# Patient Record
Sex: FEMALE | Race: WHITE | Employment: FULL TIME | URBAN - METROPOLITAN AREA
[De-identification: names, ages, dates, MRNs, and addresses within clinical notes are randomized per-mention and may not be internally consistent; named-entity substitution may affect disease eponyms.]

---

## 2017-05-05 ENCOUNTER — OFFICE VISIT (OUTPATIENT)
Dept: INTERNAL MEDICINE CLINIC | Age: 22
End: 2017-05-05

## 2017-05-05 VITALS
TEMPERATURE: 99.2 F | BODY MASS INDEX: 24.98 KG/M2 | HEART RATE: 78 BPM | RESPIRATION RATE: 16 BRPM | WEIGHT: 155.4 LBS | HEIGHT: 66 IN | OXYGEN SATURATION: 98 % | DIASTOLIC BLOOD PRESSURE: 80 MMHG | SYSTOLIC BLOOD PRESSURE: 116 MMHG

## 2017-05-05 DIAGNOSIS — N63.21 BREAST LUMP ON LEFT SIDE AT 2 O'CLOCK POSITION: Primary | ICD-10-CM

## 2017-05-05 DIAGNOSIS — F41.9 ANXIETY: ICD-10-CM

## 2017-05-05 NOTE — MR AVS SNAPSHOT
Visit Information Date & Time Provider Department Dept. Phone Encounter #  
 5/5/2017  2:45 PM Brandy Brooke MD [place] [id number] [facility] [phone] Upcoming Health Maintenance Date Due  
 HPV AGE 9Y-34Y (1 of 3 - Female 3 Dose Series) 5/30/2005 DTaP/Tdap/Td series (1 - Tdap) 5/30/2015 PAP AKA CERVICAL CYTOLOGY 5/30/2015 INFLUENZA AGE 9 TO ADULT 8/1/2017 Allergies as of 5/5/2017  Review Complete On: 5/5/2017 By: Cornell Baum No Known Allergies Current Immunizations  Never Reviewed No immunizations on file. Not reviewed this visit Vitals BP Pulse Temp Resp Height(growth percentile) Weight(growth percentile) 116/80 (BP 1 Location: Left arm, BP Patient Position: Sitting) 78 99.2 °F (37.3 °C) (Oral) 16 5' 6\" (1.676 m) 155 lb 6.4 oz (70.5 kg) LMP SpO2 BMI OB Status Smoking Status 04/24/2017 (Exact Date) 98% 25.08 kg/m2 IUD Never Smoker Vitals History BMI and BSA Data Body Mass Index Body Surface Area 25.08 kg/m 2 1.81 m 2 Your Updated Medication List  
  
Notice  As of 5/5/2017  3:48 PM  
 You have not been prescribed any medications. Patient Instructions Exercise 1: 
The 4-7-8 (or Relaxing Breath) Exercise This exercise is utterly simple, takes almost no time, requires no equipment and can be done anywhere. Although you can do the exercise in any position, sit with your back straight while learning the exercise. Place the tip of your tongue against the ridge of tissue just behind your upper front teeth, and keep it there through the entire exercise. You will be exhaling through your mouth around your tongue; try pursing your lips slightly if this seems awkward. ? Exhale completely through your mouth, making a whoosh sound. ? Close your mouth and inhale quietly through your nose to a mental count of four. ? Hold your breath for a count of seven. ? Exhale completely through your mouth, making a whoosh sound to a count of eight. ? This is one breath. Now inhale again and repeat the cycle three more times for a total of four breaths. Note that you always inhale quietly through your nose and exhale audibly through your mouth. The tip of your tongue stays in position the whole time. Exhalation takes twice as long as inhalation. The absolute time you spend on each phase is not important; the ratio of 4:7:8 is important. If you have trouble holding your breath, speed the exercise up but keep to the ratio of 4:7:8 for the three phases. With practice you can slow it all down and get used to inhaling and exhaling more and more deeply. This exercise is a natural tranquilizer for the nervous system. Unlike tranquilizing drugs, which are often effective when you first take them but then lose their power over time, this exercise is subtle when you first try it but gains in power with repetition and practice. Do it at least twice a day. You cannot do it too frequently. Do not do more than four breaths at one time for the first month of practice. Later, if you wish, you can extend it to eight breaths. If you feel a little lightheaded when you first breathe this way, do not be concerned; it will pass. Once you develop this technique by practicing it every day, it will be a very useful tool that you will always have with you. Use it whenever anything upsetting happens - before you react. Use it whenever you are aware of internal tension. Use it to help you fall asleep. This exercise cannot be recommended too highly. Everyone can benefit from it. Exercise 2: 
Breath Counting If you want to get a feel for this challenging work, try your hand at breath counting, a deceptively simple technique much used in [address] practice. Sit in a comfortable position with the spine straight and head inclined slightly forward. Gently close your eyes and take a few deep breaths.  Then let the breath come naturally without trying to influence it. Ideally it will be quiet and slow, but depth and rhythm may vary. ? To begin the exercise, count \"one\" to yourself as you exhale. ? The next time you exhale, count \"two,\" and so on up to [place]. \" 
? Then begin a new cycle, counting \"one\" on the next exhalation. Never count higher than \"five,\" and count only when you exhale. You will know your attention has wandered when you find yourself up to \"eight,\" \"12,\" even \"19. \" Try to do 10 minutes of this form of meditation. Taken from Lashae Martinez MD [facility] of [address] Search Ezio Martinez breathing exercises on google and you will see a video Glinda Merlin Rescue remedy [facility]  (Gisela kava) Introducing [facility]! Mir Watters introduces [employer] patient portal. Now you can access parts of your medical record, email your doctor's office, and request medication refills online. 1. In your internet browser, go to [email or web address] 2. Click on the First Time User? Click Here link in the Sign In box. You will see the New Member Sign Up page. 3. Enter your [employer] Access Code exactly as it appears below. You will not need to use this code after youve completed the sign-up process. If you do not sign up before the expiration date, you must request a new code. · [employer] Access Code: [email or web address] Expires: 8/3/2017  3:48 PM 
 
4. Enter the last four digits of your Social Security Number (xxxx) and Date of Birth (mm/dd/yyyy) as indicated and click Submit. You will be taken to the next sign-up page. 5. Create a My Study Rewardst ID. This will be your [employer] login ID and cannot be changed, so think of one that is secure and easy to remember. 6. Create a [employer] password. You can change your password at any time. 7. Enter your Password Reset Question and Answer. This can be used at a later time if you forget your password. 8. Enter your e-mail address. You will receive e-mail notification when new information is available in [address] 9. Click Sign Up. You can now view and download portions of your medical record. 10. Click the Download Summary menu link to download a portable copy of your medical information. If you have questions, please visit the Frequently Asked Questions section of the [employer] website. Remember, [employer] is NOT to be used for urgent needs. For medical emergencies, dial 911. Now available from your iPhone and Android! Please provide this summary of care documentation to your next provider. If you have any questions after today's visit, please call [phone].

## 2017-05-05 NOTE — PROGRESS NOTES
Establish Care       HPI:  Katherine Avitia is a 24y.o. year old female who is here to establish care. She  had her edical care:  [facility] pediatrician. Went to [facility]      She reports the following history and medical concerns:      Dr. Harlan Schafer  Works at [employer] one -     Experiencing a lot of stress and anxiety- zoloft in the past.  A couple of months anxiety - fiance deployed to [facility]. Her anxiety increased with this mass found by GYN at last visit. Aunt- breast cancer. At 42 yo. Left breast pain-  Found by GYN. Has IUD. Ultrasound was normal tissue as per patient and told by nurse if she wants biopsy- she can get it (which made her worried)    Workouts 4 times a week. Do yoga classes. Neck shoulder tightness bilaterally    Assessment and Plan        1. Breast lump on left side at 2 o'clock position  Palpable mass without having any other dense tissue in other breast.  No LN found. Ultrasound negative. Will observe lesion and get records. 2. Anxiety  Breathing exercises given to patient. Rescue remedy. Continue with exercise. Start volleyball. 3. Neck Tightness- suggest stand up desk to help with neck strain issues. Visit Vitals    /80 (BP 1 Location: Left arm, BP Patient Position: Sitting)    Pulse 78    Temp 99.2 °F (37.3 °C) (Oral)    Resp 16    Ht 5' 6\" (1.676 m)    Wt 155 lb 6.4 oz (70.5 kg)    LMP 04/24/2017 (Exact Date)    SpO2 98%    BMI 25.08 kg/m2       Historical Data    Past Medical History:   Diagnosis Date    Stress 2017       History reviewed. No pertinent surgical history. No outpatient encounter prescriptions on file as of 5/5/2017. No facility-administered encounter medications on file as of 5/5/2017. No Known Allergies     Social History     Social History    Marital status: SINGLE     Spouse name: N/A    Number of children: N/A    Years of education: N/A     Occupational History    Not on file.      Social History Main Topics  Smoking status: Never Smoker    Smokeless tobacco: Never Used    Alcohol use Yes      Comment: occasional     Drug use: No    Sexual activity: Not on file     Other Topics Concern    Not on file     Social History Narrative    No narrative on file        Review of Systems   Constitutional: Negative for weight loss. Eyes: Negative for blurred vision. Respiratory: Negative for shortness of breath. Cardiovascular: Negative for chest pain. Gastrointestinal: Negative for abdominal pain. Genitourinary: Negative for dysuria and frequency. Skin: Negative for rash. Neurological: Negative for dizziness, weakness and headaches. Psychiatric/Behavioral: Negative for depression. The patient is nervous/anxious. Physical Exam   Constitutional: She appears well-developed and well-nourished. She is active. Non-toxic appearance. She does not have a sickly appearance. She does not appear ill. No distress. Eyes: Conjunctivae are normal.   Cardiovascular: Normal rate, regular rhythm, S1 normal, S2 normal, normal heart sounds and normal pulses. Exam reveals no gallop and no friction rub. Pulmonary/Chest: Effort normal and breath sounds normal. No respiratory distress. Abdominal: Soft. Bowel sounds are normal.   Musculoskeletal: She exhibits no edema or deformity. Back:    Neurological: She is alert. Skin: Skin is warm and dry. No rash noted. No pallor. Psychiatric: Her behavior is normal. Her mood appears anxious. Her affect is not blunt and not inappropriate. She does not exhibit a depressed mood. Vitals reviewed. Ortho Exam       No orders of the defined types were placed in this encounter. I have reviewed the patient's medical history in detail and updated the computerized patient record. We had a prolonged discussion about these complex clinical issues and went over the various important aspects to consider. All questions were answered.      Advised her to call back or return to office if symptoms do not improve, change in nature, or persist.    She was given an after visit summary or informed of [employer] Access which includes patient instructions, diagnoses, current medications, & vitals. She expressed understanding with the diagnosis and plan.

## 2017-05-05 NOTE — PATIENT INSTRUCTIONS
Exercise 1:  The 4-7-8 (or Relaxing Breath) Exercise  This exercise is utterly simple, takes almost no time, requires no equipment and can be done anywhere. Although you can do the exercise in any position, sit with your back straight while learning the exercise. Place the tip of your tongue against the ridge of tissue just behind your upper front teeth, and keep it there through the entire exercise. You will be exhaling through your mouth around your tongue; try pursing your lips slightly if this seems awkward.  Exhale completely through your mouth, making a whoosh sound.  Close your mouth and inhale quietly through your nose to a mental count of four.  Hold your breath for a count of seven.  Exhale completely through your mouth, making a whoosh sound to a count of eight.  This is one breath. Now inhale again and repeat the cycle three more times for a total of four breaths. Note that you always inhale quietly through your nose and exhale audibly through your mouth. The tip of your tongue stays in position the whole time. Exhalation takes twice as long as inhalation. The absolute time you spend on each phase is not important; the ratio of 4:7:8 is important. If you have trouble holding your breath, speed the exercise up but keep to the ratio of 4:7:8 for the three phases. With practice you can slow it all down and get used to inhaling and exhaling more and more deeply. This exercise is a natural tranquilizer for the nervous system. Unlike tranquilizing drugs, which are often effective when you first take them but then lose their power over time, this exercise is subtle when you first try it but gains in power with repetition and practice. Do it at least twice a day. You cannot do it too frequently. Do not do more than four breaths at one time for the first month of practice. Later, if you wish, you can extend it to eight breaths.  If you feel a little lightheaded when you first breathe this way, do not be concerned; it will pass. Once you develop this technique by practicing it every day, it will be a very useful tool that you will always have with you. Use it whenever anything upsetting happens - before you react. Use it whenever you are aware of internal tension. Use it to help you fall asleep. This exercise cannot be recommended too highly. Everyone can benefit from it. Exercise 2:  Breath Counting  If you want to get a feel for this challenging work, try your hand at breath counting, a deceptively simple technique much used in [facility] [address] practice. Sit in a comfortable position with the spine straight and head inclined slightly forward. Gently close your eyes and take a few deep breaths. Then let the breath come naturally without trying to influence it. Ideally it will be quiet and slow, but depth and rhythm may vary.  To begin the exercise, count \"one\" to yourself as you exhale.  The next time you exhale, count \"two,\" and so on up to [place]. \"   Then begin a new cycle, counting \"one\" on the next exhalation. Never count higher than \"five,\" and count only when you exhale. You will know your attention has wandered when you find yourself up to \"eight,\" \"12,\" even \"19. \"  Try to do 10 minutes of this form of meditation.      Taken from Tawanna Rahman MD [facility] of [address] [place] breathing exercises on [employer] and you will see a video       Ludwig Viramontes Rescue remedy      [facility]  (Gisela kava)

## 2017-05-05 NOTE — LETTER
5/5/2017 3:52 PM 
 
Ms. Nevaeh Goodson [address] [address] To Whom It May Concern: 
 
Nevaeh Goodson is currently under the care of [facility]. She will require a stand up desk for medical indication. If there are questions or concerns please have the patient contact our office. Sincerely, Hailey Young MD

## 2017-05-07 PROBLEM — N63.21 BREAST LUMP ON LEFT SIDE AT 2 O'CLOCK POSITION: Status: ACTIVE | Noted: 2017-05-07

## 2017-05-07 PROBLEM — F41.9 ANXIETY: Status: ACTIVE | Noted: 2017-05-07

## 2017-05-26 ENCOUNTER — TELEPHONE (OUTPATIENT)
Dept: INTERNAL MEDICINE CLINIC | Age: 22
End: 2017-05-26

## 2017-05-26 NOTE — TELEPHONE ENCOUNTER
Called phone number provided by [employer] to return call about patient for underlining Dx related to form that was sent back via fax to [employer] on 5/24/2017. Spoke with Dr. Roshan Sapp, he said to please call them back with Dx. Left message to call the office back.  Will continue to reach out before 1:00pm.

## 2017-06-30 ENCOUNTER — OFFICE VISIT (OUTPATIENT)
Dept: INTERNAL MEDICINE CLINIC | Age: 22
End: 2017-06-30

## 2017-06-30 VITALS
BODY MASS INDEX: 24.11 KG/M2 | SYSTOLIC BLOOD PRESSURE: 120 MMHG | OXYGEN SATURATION: 98 % | HEART RATE: 73 BPM | TEMPERATURE: 98.2 F | DIASTOLIC BLOOD PRESSURE: 60 MMHG | WEIGHT: 150 LBS | RESPIRATION RATE: 18 BRPM | HEIGHT: 66 IN

## 2017-06-30 DIAGNOSIS — M62.838 NECK MUSCLE SPASM: ICD-10-CM

## 2017-06-30 DIAGNOSIS — N63.21 BREAST LUMP ON LEFT SIDE AT 2 O'CLOCK POSITION: ICD-10-CM

## 2017-06-30 DIAGNOSIS — K21.9 GASTROESOPHAGEAL REFLUX DISEASE WITHOUT ESOPHAGITIS: Primary | ICD-10-CM

## 2017-06-30 DIAGNOSIS — L70.9 ACNE, UNSPECIFIED ACNE TYPE: ICD-10-CM

## 2017-06-30 RX ORDER — RABEPRAZOLE SODIUM 20 MG/1
20 TABLET, DELAYED RELEASE ORAL
Qty: 20 TAB | Refills: 0 | Status: SHIPPED | OUTPATIENT
Start: 2017-06-30 | End: 2020-05-23

## 2017-06-30 NOTE — PROGRESS NOTES
Chief Complaint   Patient presents with    Other     lump left breast     Stress    Anxiety     1. Have you been to the ER, urgent care clinic since your last visit? No  Hospitalized since your last visit? No    2. Have you seen or consulted any other health care providers outside of the [address] [place] [address] since your last visit? No  Include any pap smears or colon screening.  No

## 2017-10-03 ENCOUNTER — OFFICE VISIT (OUTPATIENT)
Dept: INTERNAL MEDICINE CLINIC | Age: 22
End: 2017-10-03

## 2017-10-03 VITALS
HEIGHT: 66 IN | RESPIRATION RATE: 21 BRPM | DIASTOLIC BLOOD PRESSURE: 74 MMHG | BODY MASS INDEX: 25.07 KG/M2 | TEMPERATURE: 98.5 F | SYSTOLIC BLOOD PRESSURE: 118 MMHG | OXYGEN SATURATION: 98 % | WEIGHT: 156 LBS | HEART RATE: 83 BPM

## 2017-10-03 DIAGNOSIS — I45.9 SKIPPED BEATS: ICD-10-CM

## 2017-10-03 DIAGNOSIS — Z23 ENCOUNTER FOR IMMUNIZATION: ICD-10-CM

## 2017-10-03 DIAGNOSIS — M62.830 BACK SPASM: Primary | ICD-10-CM

## 2017-10-03 NOTE — PATIENT INSTRUCTIONS
Vaccine Information Statement    Influenza (Flu) Vaccine (Inactivated or Recombinant): What you need to know    Many Vaccine Information Statements are available in Kiswahili and other languages. See www.immunize.org/vis  Hojas de Información Sobre Vacunas están disponibles en Español y en muchos otros idiomas. Visite www.immunize.org/vis    1. Why get vaccinated? Influenza (flu) is a contagious disease that spreads around the [place] every year, usually between October and May. Flu is caused by influenza viruses, and is spread mainly by coughing, sneezing, and close contact. Anyone can get flu. Flu strikes suddenly and can last several days. Symptoms vary by age, but can include:   fever/chills   sore throat   muscle aches   fatigue   cough   headache    runny or stuffy nose    Flu can also lead to pneumonia and blood infections, and cause diarrhea and seizures in children. If you have a medical condition, such as heart or lung disease, flu can make it worse. Flu is more dangerous for some people. Infants and young children, people 72years of age and older, pregnant women, and people with certain health conditions or a weakened immune system are at greatest risk. Each year thousands of people in the [facility] die from flu, and many more are hospitalized. Flu vaccine can:   keep you from getting flu,   make flu less severe if you do get it, and   keep you from spreading flu to your family and other people. 2. Inactivated and recombinant flu vaccines    A dose of flu vaccine is recommended every flu season. Children 6 months through 6years of age may need two doses during the same flu season. Everyone else needs only one dose each flu season.        Some inactivated flu vaccines contain a very small amount of a mercury-based preservative called thimerosal. Studies have not shown thimerosal in vaccines to be harmful, but flu vaccines that do not contain thimerosal are available. There is no live flu virus in flu shots. They cannot cause the flu. There are many flu viruses, and they are always changing. Each year a new flu vaccine is made to protect against three or four viruses that are likely to cause disease in the upcoming flu season. But even when the vaccine doesnt exactly match these viruses, it may still provide some protection    Flu vaccine cannot prevent:   flu that is caused by a virus not covered by the vaccine, or   illnesses that look like flu but are not. It takes about 2 weeks for protection to develop after vaccination, and protection lasts through the flu season. 3. Some people should not get this vaccine    Tell the person who is giving you the vaccine:     If you have any severe, life-threatening allergies. If you ever had a life-threatening allergic reaction after a dose of flu vaccine, or have a severe allergy to any part of this vaccine, you may be advised not to get vaccinated. Most, but not all, types of flu vaccine contain a small amount of egg protein.  If you ever had Guillain-Barré Syndrome (also called GBS). Some people with a history of GBS should not get this vaccine. This should be discussed with your doctor.  If you are not feeling well. It is usually okay to get flu vaccine when you have a mild illness, but you might be asked to come back when you feel better. 4. Risks of a vaccine reaction    With any medicine, including vaccines, there is a chance of reactions. These are usually mild and go away on their own, but serious reactions are also possible. Most people who get a flu shot do not have any problems with it.      Minor problems following a flu shot include:    soreness, redness, or swelling where the shot was given     hoarseness   sore, red or itchy eyes   cough   fever   aches   headache   itching   fatigue  If these problems occur, they usually begin soon after the shot and last 1 or 2 days. More serious problems following a flu shot can include the following:     There may be a small increased risk of Guillain-Barré Syndrome (GBS) after inactivated flu vaccine. This risk has been estimated at 1 or 2 additional cases per million people vaccinated. This is much lower than the risk of severe complications from flu, which can be prevented by flu vaccine.  Young children who get the flu shot along with pneumococcal vaccine (PCV13) and/or DTaP vaccine at the same time might be slightly more likely to have a seizure caused by fever. Ask your doctor for more information. Tell your doctor if a child who is getting flu vaccine has ever had a seizure. Problems that could happen after any injected vaccine:      People sometimes faint after a medical procedure, including vaccination. Sitting or lying down for about 15 minutes can help prevent fainting, and injuries caused by a fall. Tell your doctor if you feel dizzy, or have vision changes or ringing in the ears.  Some people get severe pain in the shoulder and have difficulty moving the arm where a shot was given. This happens very rarely.  Any medication can cause a severe allergic reaction. Such reactions from a vaccine are very rare, estimated at about 1 in a million doses, and would happen within a few minutes to a few hours after the vaccination. As with any medicine, there is a very remote chance of a vaccine causing a serious injury or death. The safety of vaccines is always being monitored. For more information, visit: www.cdc.gov/vaccinesafety/    5. What if there is a serious reaction? What should I look for?  Look for anything that concerns you, such as signs of a severe allergic reaction, very high fever, or unusual behavior.     Signs of a severe allergic reaction can include hives, swelling of the face and throat, difficulty breathing, a fast heartbeat, dizziness, and weakness  usually within a few minutes to a few hours after the vaccination. What should I do?  If you think it is a severe allergic reaction or other emergency that cant wait, call 9-1-1 and get the person to the nearest hospital. Otherwise, call your doctor.  Reactions should be reported to the Vaccine Adverse Event Reporting System (VAERS). Your doctor should file this report, or you can do it yourself through  the VAERS web site at www.vaers. [facility].gov, or by calling 4-381.838.5261. VAERS does not give medical advice. 6. The National Vaccine Injury Compensation Program    The [facility] Vaccine Injury Compensation Program (VICP) is a federal program that was created to compensate people who may have been injured by certain vaccines. Persons who believe they may have been injured by a vaccine can learn about the program and about filing a claim by calling 6-634.790.2069 or visiting the [employer] website at www.[facility].gov/vaccinecompensation. There is a time limit to file a claim for compensation. 7. How can I learn more?  Ask your healthcare provider. He or she can give you the vaccine package insert or suggest other sources of information.  Call your local or state health department.  Contact the Centers for Disease Control and Prevention (CDC):  - Call 6-593.947.7209 (1-800-CDC-INFO) or  - Visit CDCs website at www.cdc.gov/flu    Vaccine Information Statement   Inactivated Influenza Vaccine   8/7/2015  42 SRAVANI Arnoldodinoandres Lena [id number]-[id number]    Department of Health and Human Services  Centers for Disease Control and Prevention    Office Use Only    Exercise 1:  The 4-7-8 (or Relaxing Breath) Exercise  This exercise is utterly simple, takes almost no time, requires no equipment and can be done anywhere. Although you can do the exercise in any position, sit with your back straight while learning the exercise.  Place the tip of your tongue against the ridge of tissue just behind your upper front teeth, and keep it there through the entire exercise. You will be exhaling through your mouth around your tongue; try pursing your lips slightly if this seems awkward.  Exhale completely through your mouth, making a whoosh sound.  Close your mouth and inhale quietly through your nose to a mental count of four.  Hold your breath for a count of seven.  Exhale completely through your mouth, making a whoosh sound to a count of eight.  This is one breath. Now inhale again and repeat the cycle three more times for a total of four breaths. Note that you always inhale quietly through your nose and exhale audibly through your mouth. The tip of your tongue stays in position the whole time. Exhalation takes twice as long as inhalation. The absolute time you spend on each phase is not important; the ratio of 4:7:8 is important. If you have trouble holding your breath, speed the exercise up but keep to the ratio of 4:7:8 for the three phases. With practice you can slow it all down and get used to inhaling and exhaling more and more deeply. This exercise is a natural tranquilizer for the nervous system. Unlike tranquilizing drugs, which are often effective when you first take them but then lose their power over time, this exercise is subtle when you first try it but gains in power with repetition and practice. Do it at least twice a day. You cannot do it too frequently. Do not do more than four breaths at one time for the first month of practice. Later, if you wish, you can extend it to eight breaths. If you feel a little lightheaded when you first breathe this way, do not be concerned; it will pass. Once you develop this technique by practicing it every day, it will be a very useful tool that you will always have with you. Use it whenever anything upsetting happens - before you react. Use it whenever you are aware of internal tension. Use it to help you fall asleep. This exercise cannot be recommended too highly. Everyone can benefit from it.     Exercise 2: Breath Counting  If you want to get a feel for this challenging work, try your hand at breath counting, a deceptively simple technique much used in [address] practice. Sit in a comfortable position with the spine straight and head inclined slightly forward. Gently close your eyes and take a few deep breaths. Then let the breath come naturally without trying to influence it. Ideally it will be quiet and slow, but depth and rhythm may vary.  To begin the exercise, count \"one\" to yourself as you exhale.  The next time you exhale, count \"two,\" and so on up to [place]. \"   Then begin a new cycle, counting \"one\" on the next exhalation. Never count higher than \"five,\" and count only when you exhale. You will know your attention has wandered when you find yourself up to \"eight,\" \"12,\" even \"19. \"  Try to do 10 minutes of this form of meditation.      Taken from Odalis Abdalla MD [facility] of [id number] [employer] breathing exercises on [employer] and you will see a video

## 2017-10-03 NOTE — PROGRESS NOTES
Primary Care Doctor is:  Ni Castle MD    Pain (Chronic) (follow up back pain and palpations )         HPI:  Dafne Aguilar is a 20y.o. year old female who is here for an acute care visit and has the following concerns:    More upper back pain  Different spot in mid back now. Tried massage. No radiation. Did get standup desk and it helps. Aching but doesn't keep her up at night. No tingling. New Problem:     sometimes she feels during stress- beats harder than the other. Not the every other day. Then a couple minutes later. Saw cardiologist about it and did echo. Checked cbc and thyroid too. Reassurance to her. Drinks only one cup a day. Not exercising as much. Never gets dizzy with it. .        Assessment and Plan        1. Encounter for immunization  Immunization given. Discussed risks and benefits. Side effects. VIS given through visit summary via [employer] or paper copy if not on Mychart   - Influenza virus vaccine (QUADRIVALENT PRES FREE SYRINGE) IM (96386)    2. Back spasm  TIME OUT performed immediately prior to start of procedure:   I, Cristina Tate MD, have performed the following reviews on Dafne Aguilar   prior to the start of the procedure:     * Patient was identified by name and date of birth   * Agreement on procedure being performed was verified   * Risks and Benefits explained to the patient   * Procedure site verified and marked as necessary   * Patient was positioned for comfort   * Consent was given by patient on previous visit and signed    Date of procedure: 10/3/2017  Procedure performed by:Earle Orosco MD   Patient assisted by: self   How tolerated by patient: tolerated the procedure well with no complications   Comments: none         Patient explained the risks and benefits of acupuncture to help with the acute problem. There can be some soreness afterwards and the effect can be immediate to slightly delayed (2-3 days).   If the problem persists or gets worse, please call back or send a my chart message. If you experience shortness of breath, please let me know immediately. Patient agreed to proceed with treatment. Seirin packaged needle used No. 5 (0.25) 30 mm  QTY 4  Points palpated and inserted 5-10 mm at marked points  Patient tolerated procedure and felt complete relief. - NH ACUPUNCT W/O ELEC STIMUL 15 MIN    3. Skipped beats  Ok to monitor. Breathing exercises given when stressed. Reviewed echo and cardiology notes. Denies heavy periods. Symptom diary. I spent 25 minutes with the patient and > 50% of the time was spent in counseling and coordination of care regarding stretching, stress control. Use tiger balm. Face to face time 25 minutes. Visit Vitals    /74 (BP 1 Location: Left arm, BP Patient Position: Sitting)    Pulse 83    Temp 98.5 °F (36.9 °C) (Oral)    Resp 21    Ht 5' 6\" (1.676 m)    Wt 156 lb (70.8 kg)    SpO2 98%    BMI 25.18 kg/m2       Historical Data    Past Medical History:   Diagnosis Date    Anxiety 5/7/2017    Breast lump on left side at 2 o'clock position 5/7/2017       No past surgical history on file. Outpatient Encounter Prescriptions as of 10/3/2017   Medication Sig Dispense Refill    RABEprazole (ACIPHEX) 20 mg tablet Take 1 Tab by mouth daily as needed. Indications: HEARTBURN 20 Tab 0     No facility-administered encounter medications on file as of 10/3/2017. No Known Allergies     Social History     Social History    Marital status: SINGLE     Spouse name: N/A    Number of children: N/A    Years of education: N/A     Occupational History    Not on file. Social History Main Topics    Smoking status: Never Smoker    Smokeless tobacco: Never Used    Alcohol use Yes      Comment: occasional     Drug use: No    Sexual activity: Not on file     Other Topics Concern    Not on file     Social History Narrative        family history is not on file.      Review of Systems   Constitutional: Negative for weight loss. Eyes: Negative for blurred vision. Respiratory: Negative for cough and shortness of breath. Cardiovascular: Positive for palpitations. Negative for chest pain, orthopnea and leg swelling. Gastrointestinal: Negative for abdominal pain. Genitourinary: Negative for dysuria and frequency. Musculoskeletal: Positive for myalgias and neck pain. Negative for back pain, falls and joint pain. Skin: Negative for rash. Neurological: Negative for dizziness, focal weakness, weakness and headaches. Endo/Heme/Allergies: Negative for environmental allergies. Does not bruise/bleed easily. Physical Exam   Eyes: Conjunctivae are normal.   Neck: Neck supple. Cardiovascular: Normal rate and regular rhythm. Exam reveals no gallop and no friction rub. No murmur heard. Pulmonary/Chest: Effort normal and breath sounds normal.   Musculoskeletal:        Back:    Lymphadenopathy:     She has no cervical adenopathy. Ortho Exam           I have reviewed the patient's medical history in detail and updated the computerized patient record. We had a prolonged discussion about these complex clinical issues and went over the various important aspects to consider. All questions were answered. Advised her to call back or return to office if symptoms do not improve, change in nature, or persist.    She was given an after visit summary or informed of [employer] Access which includes patient instructions, diagnoses, current medications, & vitals. She expressed understanding with the diagnosis and plan.

## 2017-10-03 NOTE — PROGRESS NOTES
Chief Complaint   Patient presents with    Pain (Chronic)     follow up back pain and palpations         1. Have you been to the ER, urgent care clinic since your last visit? No  Hospitalized since your last visit? No    2. Have you seen or consulted any other health care providers outside of the [address] [place] [address] since your last visit? No  Include any pap smears or colon screening. No     Selena Euceda who present for routine immunizations. She denies any symptoms , reactions or allergies that would exclude them from being immunized today. Risks and adverse reactions were discussed and the VIS was given to them. All questions were addressed. She was observed for 5 min post injection. There were no reactions observed.     Cj Silva

## 2018-01-12 ENCOUNTER — OFFICE VISIT (OUTPATIENT)
Dept: INTERNAL MEDICINE CLINIC | Age: 23
End: 2018-01-12

## 2018-01-12 VITALS
OXYGEN SATURATION: 98 % | HEIGHT: 66 IN | BODY MASS INDEX: 26.76 KG/M2 | WEIGHT: 166.5 LBS | TEMPERATURE: 98.3 F | RESPIRATION RATE: 14 BRPM | HEART RATE: 65 BPM | SYSTOLIC BLOOD PRESSURE: 100 MMHG | DIASTOLIC BLOOD PRESSURE: 70 MMHG

## 2018-01-12 DIAGNOSIS — M62.830 BACK MUSCLE SPASM: Primary | ICD-10-CM

## 2018-01-12 NOTE — PROGRESS NOTES
Chief Complaint   Patient presents with    Back Pain     Acupuncture     1. Have you been to the ER, urgent care clinic since your last visit? Hospitalized since your last visit? No    2. Have you seen or consulted any other health care providers outside of the [facility] since your last visit? Include any pap smears or colon screening.  No

## 2018-01-14 PROBLEM — M62.830 BACK MUSCLE SPASM: Status: ACTIVE | Noted: 2018-01-14

## 2018-01-14 NOTE — PROGRESS NOTES
Primary Care Doctor is:  Marifer Farley MD    Back Pain         HPI:  Ulysses Cea is a 20y.o. year old female who is here for an acute care visit and has the following concerns:    New area of back spasm last week that was pretty bad near her bra line on the left side. Radiated up to her left shoulder. Other areas we did acupuncture has helped and she is doing well. She has moved her desk so she lost her stand up desk. She is requesting it moved but has been doing well otherwise with regular stretching. There is no numbness or tingling. No chest pain, dizziness, or shortness or breath. Assessment and Plan        1. Back muscle spasm  Trial of acupuncture to determine if spasm. TIME OUT performed immediately prior to start of procedure:   Jory Sarmiento MD, have performed the following reviews on Ulysses Cea   prior to the start of the procedure:     * Patient was identified by name and date of birth   * Agreement on procedure being performed was verified   * Risks and Benefits explained to the patient   * Procedure site verified and marked as necessary   * Patient was positioned for comfort   * Consent was given by patient    Date of procedure: 1/12/2018  Procedure performed by:Earle Palencia MD   Patient assisted by: self   How tolerated by patient: tolerated the procedure well with no complications   Comments: none         Patient explained the risks and benefits of acupuncture to help with the acute problem. There can be some soreness afterwards and the effect can be immediate to slightly delayed (2-3 days). If the problem persists or gets worse, please call back or send a my chart message. If you experience shortness of breath, please let me know immediately. Patient agreed to proceed with treatment. Seirin packaged needle used No. 5 (0.25) 30 mm  QTY 5  Points palpated and inserted 5-10 mm at marked points  Patient tolerated procedure and felt complete relief.             Visit Vitals    /70 (BP 1 Location: Left arm, BP Patient Position: Sitting)    Pulse 65    Temp 98.3 °F (36.8 °C) (Oral)    Resp 14    Ht 5' 6\" (1.676 m)    Wt 166 lb 8 oz (75.5 kg)    SpO2 98%    BMI 26.87 kg/m2       Historical Data    Past Medical History:   Diagnosis Date    Anxiety 5/7/2017    Back muscle spasm 1/14/2018    Breast lump on left side at 2 o'clock position 5/7/2017       History reviewed. No pertinent surgical history. Outpatient Encounter Prescriptions as of 1/12/2018   Medication Sig Dispense Refill    RABEprazole (ACIPHEX) 20 mg tablet Take 1 Tab by mouth daily as needed. Indications: HEARTBURN 20 Tab 0     No facility-administered encounter medications on file as of 1/12/2018. No Known Allergies     Social History     Social History    Marital status: SINGLE     Spouse name: N/A    Number of children: N/A    Years of education: N/A     Occupational History    Not on file. Social History Main Topics    Smoking status: Never Smoker    Smokeless tobacco: Never Used    Alcohol use Yes      Comment: occasional     Drug use: No    Sexual activity: Not on file     Other Topics Concern    Not on file     Social History Narrative        family history is not on file. Review of Systems   Constitutional: Negative for chills and fever. Eyes: Negative for blurred vision. Respiratory: Negative for shortness of breath. Cardiovascular: Negative for chest pain. Musculoskeletal: Positive for back pain. Negative for myalgias and neck pain. Physical Exam   Musculoskeletal:        Back:      Ortho Exam           I have reviewed the patient's medical history in detail and updated the computerized patient record. We had a prolonged discussion about these complex clinical issues and went over the various important aspects to consider. All questions were answered.      Advised her to call back or return to office if symptoms do not improve, change in nature, or persist.    She was given an after visit summary or informed of [employer] Access which includes patient instructions, diagnoses, current medications, & vitals. She expressed understanding with the diagnosis and plan.

## 2018-02-13 ENCOUNTER — OFFICE VISIT (OUTPATIENT)
Dept: INTERNAL MEDICINE CLINIC | Age: 23
End: 2018-02-13

## 2018-02-13 VITALS
SYSTOLIC BLOOD PRESSURE: 90 MMHG | DIASTOLIC BLOOD PRESSURE: 62 MMHG | TEMPERATURE: 98.2 F | BODY MASS INDEX: 26.58 KG/M2 | HEART RATE: 70 BPM | OXYGEN SATURATION: 98 % | RESPIRATION RATE: 14 BRPM | HEIGHT: 66 IN | WEIGHT: 165.4 LBS

## 2018-02-13 DIAGNOSIS — F41.9 ANXIETY: Primary | ICD-10-CM

## 2018-02-13 RX ORDER — ALPRAZOLAM 0.25 MG/1
0.25 TABLET ORAL
Qty: 15 TAB | Refills: 0 | Status: SHIPPED | OUTPATIENT
Start: 2018-02-13 | End: 2018-04-23 | Stop reason: SDUPTHER

## 2018-02-13 RX ORDER — SERTRALINE HYDROCHLORIDE 25 MG/1
25 TABLET, FILM COATED ORAL DAILY
Qty: 30 TAB | Refills: 0 | Status: SHIPPED | OUTPATIENT
Start: 2018-02-13 | End: 2018-02-27 | Stop reason: SDUPTHER

## 2018-02-13 RX ORDER — SERTRALINE HYDROCHLORIDE 25 MG/1
25 TABLET, FILM COATED ORAL DAILY
Qty: 30 TAB | Refills: 0 | Status: SHIPPED | OUTPATIENT
Start: 2018-02-13 | End: 2018-02-13

## 2018-02-13 NOTE — PROGRESS NOTES
HISTORY OF PRESENT ILLNESS  Michael Becker is a 20 y.o. female. Patient reports increased anxiety over the last 3 months since her fiance returned from deployment and she started planning her wedding. She feels the anxiety at least 5 days per week with difficulty sleeping. She has frequent \"crying spells\",but reports she doesn't feel depressed. She feels quick-tempered and easily frustrated with her fiance at times. She reports dealing with anxiety for many years. She used to take zoloft and klonopin over 2 years ago, but didn't feel like it was working well for her at the time. She states she was on a very low dose. She reports a strong family history of anxiety. Patient reports wanting to start a family soon after the May 2018 wedding. Visit Vitals    BP 90/62 (BP 1 Location: Left arm, BP Patient Position: Sitting)    Pulse 70    Temp 98.2 °F (36.8 °C) (Oral)    Resp 14    Ht 5' 6\" (1.676 m)    Wt 165 lb 6.4 oz (75 kg)    SpO2 98%    BMI 26.7 kg/m2       Anxiety   The history is provided by the patient. This is a new problem. The current episode started more than 1 week ago. The problem occurs daily. The problem has been gradually worsening. Review of Systems   Cardiovascular:        Heart racing, tightness in chest at times, insomnia, crying spells   Psychiatric/Behavioral: The patient is nervous/anxious and has insomnia. Physical Exam   Constitutional: She is oriented to person, place, and time. She appears well-developed and well-nourished. Neurological: She is alert and oriented to person, place, and time. Skin: Skin is warm and dry. Psychiatric: She has a normal mood and affect. Her behavior is normal. Thought content normal.       ASSESSMENT and PLAN    ICD-10-CM ICD-9-CM    1.  Anxiety F41.9 300.00 ALPRAZolam (XANAX) 0.25 mg tablet      REFERRAL TO PSYCHIATRY      DISCONTINUED: sertraline (ZOLOFT) 25 mg tablet     Orders Placed This Encounter    REFERRAL TO PSYCHIATRY    DISCONTD: sertraline (ZOLOFT) 25 mg tablet    ALPRAZolam (XANAX) 0.25 mg tablet    DISCONTD: sertraline (ZOLOFT) 25 mg tablet    sertraline (ZOLOFT) 25 mg tablet   Contact info given for [facility]  Xanax PRN  Start daily zoloft, increase dose after 1-2 weeks if no improvement in symptoms  Follow-up in 2 weeks

## 2018-02-13 NOTE — MR AVS SNAPSHOT
[address] [facility], [address] [place] 
[phone] Patient: Sandra Castro MRN: [id number] EKR:1/57/[id number] Visit Information Date & Time Provider Department Dept. Phone Encounter #  
 2/13/2018  8:00 AM MICHELLE Muñoz [facility] [phone] [id number] Follow-up Instructions Return in about 2 weeks (around 2/27/2018) for follow-up anxiety. Your Appointments 2/27/2018  1:45 PM  
COMPLETE PHYSICAL with MD Bartolome Willard [facility]) Appt Note: [address] [facility] [place] [facility] [place], [place] [address] [facility]  
[address], [place] [address] [address] Upcoming Health Maintenance Date Due  
 HPV AGE 9Y-34Y (1 of 3 - Female 3 Dose Series) 5/30/2005 DTaP/Tdap/Td series (1 - Tdap) 5/30/2015 PAP AKA CERVICAL CYTOLOGY 5/30/2015 Allergies as of 2/13/2018  Review Complete On: 2/13/2018 By: Kenneth Caballero LPN No Known Allergies Current Immunizations  Never Reviewed Name Date Influenza Vaccine (Quad) PF 10/3/2017 Not reviewed this visit You Were Diagnosed With   
  
 Codes Comments Anxiety    -  Primary ICD-10-CM: F41.9 ICD-9-CM: 300.00 Vitals BP Pulse Temp Resp Height(growth percentile) Weight(growth percentile) 90/62 (BP 1 Location: Left arm, BP Patient Position: Sitting) 70 98.2 °F (36.8 °C) (Oral) 14 5' 6\" (1.676 m) 165 lb 6.4 oz (75 kg) SpO2 BMI OB Status Smoking Status 98% 26.7 kg/m2 IUD Never Smoker Vitals History BMI and BSA Data Body Mass Index Body Surface Area  
 26.7 kg/m 2 1.87 m 2 Preferred Pharmacy Pharmacy Name Phone Hugo [facility]roz[facility] [address] [address] Schmidt [facility] [phone] Your Updated Medication List  
  
   
This list is accurate as of: 2/13/18  8:33 AM.  Always use your most recent med list.  
  
 ALPRAZolam 0.25 mg tablet Commonly known as:  Karissa Monday Take 1 Tab by mouth three (3) times daily as needed for Anxiety. Max Daily Amount: 0.75 mg. RABEprazole 20 mg tablet Commonly known as:  ACIPHEX Take 1 Tab by mouth daily as needed. Indications: HEARTBURN  
  
 sertraline 25 mg tablet Commonly known as:  ZOLOFT Take 1 Tab by mouth daily. Prescriptions Printed Refills ALPRAZolam (XANAX) 0.25 mg tablet 0 Sig: Take 1 Tab by mouth three (3) times daily as needed for Anxiety. Max Daily Amount: 0.75 mg. Class: Print Route: Oral  
  
Prescriptions Sent to Pharmacy Refills  
 sertraline (ZOLOFT) 25 mg tablet 0 Sig: Take 1 Tab by mouth daily. Class: Normal  
 Pharmacy: [address] Ph #: [phone] Route: Oral  
  
We Performed the Following REFERRAL TO PSYCHIATRY [REF91 Custom] Follow-up Instructions Return in about 2 weeks (around 2/27/2018) for follow-up anxiety. Referral Information Referral ID Referred By Referred To  
  
 [id number] Lavinia SANCHEZ Not Available Visits Status Start Date End Date 1 New Request 2/13/18 2/13/19 If your referral has a status of pending review or denied, additional information will be sent to support the outcome of this decision. Introducing \[facility]\"" & HEALTH SERVICES! Dear Geoff Loud: 
Thank you for requesting a [employer] account. Our records indicate that you already have an active [employer] account. You can access your account anytime at [email or web address] ([employer]/LFS ([employer]) Did you know that you can access your hospital and ER discharge instructions at any time in [employer]? You can also review all of your test results from your hospital stay or ER visit. Additional Information If you have questions, please visit the Frequently Asked Questions section of the [employer] website at [email or web address] ([employer] ([employer])/. Remember, MyChart is NOT to be used for urgent needs. For medical emergencies, dial 911. Now available from your iPhone and Android! Please provide this summary of care documentation to your next provider. Your primary care clinician is listed as Pamela Flores. If you have any questions after today's visit, please call [phone].

## 2018-02-27 ENCOUNTER — OFFICE VISIT (OUTPATIENT)
Dept: INTERNAL MEDICINE CLINIC | Age: 23
End: 2018-02-27

## 2018-02-27 VITALS
HEIGHT: 66 IN | WEIGHT: 161.5 LBS | BODY MASS INDEX: 25.96 KG/M2 | TEMPERATURE: 98.8 F | SYSTOLIC BLOOD PRESSURE: 100 MMHG | OXYGEN SATURATION: 98 % | RESPIRATION RATE: 14 BRPM | DIASTOLIC BLOOD PRESSURE: 60 MMHG | HEART RATE: 67 BPM

## 2018-02-27 DIAGNOSIS — F41.9 ANXIETY: ICD-10-CM

## 2018-02-27 DIAGNOSIS — Z00.00 ROUTINE GENERAL MEDICAL EXAMINATION AT A HEALTH CARE FACILITY: Primary | ICD-10-CM

## 2018-02-27 RX ORDER — SERTRALINE HYDROCHLORIDE 25 MG/1
25 TABLET, FILM COATED ORAL DAILY
Qty: 30 TAB | Refills: 4 | Status: SHIPPED | OUTPATIENT
Start: 2018-02-27 | End: 2019-01-08 | Stop reason: SDUPTHER

## 2018-02-27 NOTE — PROGRESS NOTES
HPI:  Michael Becker is a 20y.o. year old female who is here for an annual physical:    Wt Readings from Last 3 Encounters:   02/27/18 161 lb 8 oz (73.3 kg)   02/13/18 165 lb 6.4 oz (75 kg)   01/12/18 166 lb 8 oz (75.5 kg)     Temp Readings from Last 3 Encounters:   02/27/18 98.8 °F (37.1 °C) (Oral)   02/13/18 98.2 °F (36.8 °C) (Oral)   01/12/18 98.3 °F (36.8 °C) (Oral)     BP Readings from Last 3 Encounters:   02/27/18 100/60   02/13/18 90/62   01/12/18 100/70     Pulse Readings from Last 3 Encounters:   02/27/18 67   02/13/18 70   01/12/18 65        She reports the following history and medical concerns:      Saw JT for anxiety    Been on zoloft before. Not able to sleep sometimes. Started on 25 mg and wants to stay on it for now. Upcoming wedding in May. Has form to fill out for work. Did not do labs. Body mass index is 26.07 kg/(m^2). Assessment and Plan        1. Routine general medical examination at a health care facility  Discussed increase exercise. - CBC WITH AUTOMATED DIFF  - LIPID PANEL  - TSH REFLEX TO T4  - METABOLIC PANEL, COMPREHENSIVE  - VITAMIN D, 25 HYDROXY  - UA/M W/RFLX CULTURE, ROUTINE    2. Anxiety  Try to wean off after wedding. Can affect libido. Refill sent  Try recommended supplements for helping with sleep. - sertraline (ZOLOFT) 25 mg tablet; Take 1 Tab by mouth daily. Dispense: 30 Tab; Refill: 4          Historical Data    Past Medical History:   Diagnosis Date    Anxiety 5/7/2017    Back muscle spasm 1/14/2018    Breast lump on left side at 2 o'clock position 5/7/2017       History reviewed. No pertinent surgical history. Outpatient Encounter Prescriptions as of 2/27/2018   Medication Sig Dispense Refill    sertraline (ZOLOFT) 25 mg tablet Take 1 Tab by mouth daily. 30 Tab 4    ALPRAZolam (XANAX) 0.25 mg tablet Take 1 Tab by mouth three (3) times daily as needed for Anxiety.  Max Daily Amount: 0.75 mg. 15 Tab 0    [DISCONTINUED] sertraline (ZOLOFT) 25 mg tablet Take 1 Tab by mouth daily. 30 Tab 0    RABEprazole (ACIPHEX) 20 mg tablet Take 1 Tab by mouth daily as needed. Indications: HEARTBURN 20 Tab 0     No facility-administered encounter medications on file as of 2/27/2018. No Known Allergies     Social History     Social History    Marital status: SINGLE     Spouse name: N/A    Number of children: N/A    Years of education: N/A     Occupational History    Not on file. Social History Main Topics    Smoking status: Never Smoker    Smokeless tobacco: Never Used    Alcohol use Yes      Comment: occasional     Drug use: No    Sexual activity: Not on file     Other Topics Concern    Not on file     Social History Narrative        family history is not on file. Review of Systems   Constitutional: Negative for chills, diaphoresis, fever, malaise/fatigue and weight loss. HENT: Negative for hearing loss. Respiratory: Negative for cough. Cardiovascular: Negative for chest pain. Gastrointestinal: Negative for blood in stool and constipation. Genitourinary: Negative for dysuria, flank pain, frequency and urgency. Musculoskeletal: Negative for myalgias. Skin: Negative for rash. Neurological: Negative for dizziness, weakness and headaches. Endo/Heme/Allergies: Does not bruise/bleed easily. Visit Vitals    /60 (BP 1 Location: Left arm, BP Patient Position: At rest)    Pulse 67    Temp 98.8 °F (37.1 °C) (Oral)    Resp 14    Ht 5' 6\" (1.676 m)    Wt 161 lb 8 oz (73.3 kg)    SpO2 98%    BMI 26.07 kg/m2         Physical Exam   Constitutional: She is oriented to person, place, and time and well-developed, well-nourished, and in no distress. No distress. HENT:   Nose: Nose normal.   Mouth/Throat: Oropharynx is clear and moist.   Eyes: Conjunctivae and EOM are normal.   Neck: Normal range of motion. Neck supple. No thyromegaly present. Cardiovascular: Normal rate, regular rhythm and normal heart sounds.   Exam reveals no gallop and no friction rub. Pulmonary/Chest: Effort normal and breath sounds normal. No respiratory distress. She has no wheezes. She has no rales. Abdominal: Soft. Bowel sounds are normal. She exhibits no distension. There is no tenderness. Musculoskeletal: Normal range of motion. She exhibits no edema, tenderness or deformity. Lymphadenopathy:     She has no cervical adenopathy. Neurological: She is alert and oriented to person, place, and time. Skin: Skin is warm and dry. No rash noted. Psychiatric: Affect and judgment normal.     Ortho Exam     Assessment:  See Above for discussion/plan. Annual Physical completed. I have reviewed the patient's medical history in detail and updated the computerized patient record. We had a prolonged discussion about these complex clinical issues and went over the various important aspects to consider. All questions were answered. Advised her to call back or return to office if symptoms do not improve, change in nature, or persist.    She was given an after visit summary or informed of [employer] Access which includes patient instructions, diagnoses, current medications, & vitals. She expressed understanding with the diagnosis and plan.

## 2018-02-27 NOTE — PATIENT INSTRUCTIONS
[facility].     [facility] Dr. Julieta Peabody probiotics  (Dr. Dipak Rueda)      Folic acid 406 mcg

## 2018-02-27 NOTE — MR AVS SNAPSHOT
[address] [facility], [address] [place] [address] 
[phone] Patient: Juan Walker MRN: [id number]:[id number] Visit Information Date & Time Provider Department Dept. Phone Encounter #  
 2/27/2018  1:45 PM Ruth Benson MD [place] [address] [facility] [phone] Upcoming Health Maintenance Date Due  
 HPV AGE 9Y-34Y (1 of 3 - Female 3 Dose Series) 5/30/2005 DTaP/Tdap/Td series (1 - Tdap) 5/30/2015 PAP AKA CERVICAL CYTOLOGY 5/30/2015 Allergies as of 2/27/2018  Review Complete On: 2/27/2018 By: Ruth Benson MD  
 No Known Allergies Current Immunizations  Never Reviewed Name Date Influenza Vaccine (Quad) PF 10/3/2017 Not reviewed this visit You Were Diagnosed With   
  
 Codes Comments Routine general medical examination at a health care facility    -  Primary ICD-10-CM: Z00.00 ICD-9-CM: V70.0 Anxiety     ICD-10-CM: F41.9 ICD-9-CM: 300.00 Vitals BP Pulse Temp Resp Height(growth percentile) Weight(growth percentile) 100/60 (BP 1 Location: Left arm, BP Patient Position: At rest) 67 98.8 °F (37.1 °C) (Oral) 14 5' 6\" (1.676 m) 161 lb 8 oz (73.3 kg) SpO2 BMI OB Status Smoking Status 98% 26.07 kg/m2 IUD Never Smoker Vitals History BMI and BSA Data Body Mass Index Body Surface Area 26.07 kg/m 2 1.85 m 2 Preferred Pharmacy Pharmacy Name Phone Zuri Chandler [address] [facility] [address] Brayan Toney [facility] [phone] Your Updated Medication List  
  
   
This list is accurate as of 2/27/18  3:17 PM.  Always use your most recent med list.  
  
  
  
  
 ALPRAZolam 0.25 mg tablet Commonly known as:  Mary Snuffer Take 1 Tab by mouth three (3) times daily as needed for Anxiety. Max Daily Amount: 0.75 mg. RABEprazole 20 mg tablet Commonly known as:  ACIPHEX Take 1 Tab by mouth daily as needed.  Indications: HEARTBURN  
  
 sertraline 25 mg tablet Commonly known as:  ZOLOFT Take 1 Tab by mouth daily. Prescriptions Printed Refills  
 sertraline (ZOLOFT) 25 mg tablet 4 Sig: Take 1 Tab by mouth daily. Class: Print Route: Oral  
  
We Performed the Following CBC WITH AUTOMATED DIFF [91839 CPT(R)] LIPID PANEL [95379 CPT(R)] METABOLIC PANEL, COMPREHENSIVE [40413 CPT(R)] TSH REFLEX TO T4 [48933 CPT(R)] UA/M W/RFLX CULTURE, ROUTINE [[id number] Custom] VITAMIN D, 25 HYDROXY [id number] CPT(R)] Patient Instructions [facility]. [facility] Dr. Tiffanie Moreira probiotics  (Dr. Val Goddard) Folic acid 061 mcg [facility]! Dear Melania Lee: 
Thank you for requesting a [employer] account. Our records indicate that you already have an active [employer] account. You can access your account anytime at [email or web address] Did you know that you can access your hospital and ER discharge instructions at any time in [employer]? You can also review all of your test results from your hospital stay or ER visit. Additional Information If you have questions, please visit the Frequently Asked Questions section of the [employer] website at [email or web address] Remember, [employer] is NOT to be used for urgent needs. For medical emergencies, dial 911. Now available from your iPhone and Android! Please provide this summary of care documentation to your next provider. Your primary care clinician is listed as Gale Lindsey. If you have any questions after today's visit, please call [phone].

## 2018-02-27 NOTE — PROGRESS NOTES
Chief Complaint   Patient presents with    Complete Physical     Reviewed record in preparation for visit and have obtained necessary documentation. Identified pt with two pt identifiers(name and ). Health Maintenance Due   Topic    HPV AGE 9Y-26Y (1 of 3 - Female 3 Dose Series)    DTaP/Tdap/Td series (1 - Tdap)    PAP AKA CERVICAL CYTOLOGY          Chief Complaint   Patient presents with    Complete Physical        Wt Readings from Last 3 Encounters:   18 161 lb 8 oz (73.3 kg)   18 165 lb 6.4 oz (75 kg)   18 166 lb 8 oz (75.5 kg)     Temp Readings from Last 3 Encounters:   18 98.8 °F (37.1 °C) (Oral)   18 98.2 °F (36.8 °C) (Oral)   18 98.3 °F (36.8 °C) (Oral)     BP Readings from Last 3 Encounters:   18 100/60   18 90/62   18 100/70     Pulse Readings from Last 3 Encounters:   18 67   18 70   18 65           Learning Assessment:  :     Learning Assessment 2017   PRIMARY LEARNER Patient   HIGHEST LEVEL OF EDUCATION - PRIMARY LEARNER  4 YEARS OF COLLEGE   BARRIERS PRIMARY LEARNER NONE   PRIMARY LANGUAGE ENGLISH   LEARNER PREFERENCE PRIMARY DEMONSTRATION   ANSWERED BY Patient    RELATIONSHIP SELF       Depression Screening:  :     PHQ over the last two weeks 10/3/2017   Little interest or pleasure in doing things Not at all   Feeling down, depressed or hopeless Not at all   Total Score PHQ 2 0       Fall Risk Assessment:  :     Fall Risk Assessment, last 12 mths 10/3/2017   Able to walk? Yes   Fall in past 12 months? No       Abuse Screening:  :     Abuse Screening Questionnaire 10/3/2017 2017   Do you ever feel afraid of your partner? N N   Are you in a relationship with someone who physically or mentally threatens you? N N   Is it safe for you to go home?  Y Y       Coordination of Care Questionnaire:  :     1) Have you been to an emergency room, urgent care clinic since your last visit? no   Hospitalized since your last visit? no             2) Have you seen or consulted any other health care providers outside of [address] [place] [address] since your last visit? no  (Include any pap smears or colon screenings in this section.)    3) Do you have an Advance Directive on file? no    4) Are you interested in receiving information on Advance Directives? NO      Patient is accompanied by self I have received verbal consent from Ann Ramirez to discuss any/all medical information while they are present in the room. Reviewed record  In preparation for visit and have obtained necessary documentation.

## 2018-04-23 DIAGNOSIS — F41.9 ANXIETY: ICD-10-CM

## 2018-04-23 RX ORDER — ALPRAZOLAM 0.25 MG/1
0.25 TABLET ORAL
Qty: 15 TAB | Refills: 0 | Status: SHIPPED | OUTPATIENT
Start: 2018-04-23 | End: 2018-08-06 | Stop reason: SDUPTHER

## 2018-04-23 NOTE — TELEPHONE ENCOUNTER
Last office visit- 2/27/18  Next office visit- no upcoming  Last refill- 2/13/18    Requested Prescriptions     Pending Prescriptions Disp Refills    ALPRAZolam (XANAX) 0.25 mg tablet 15 Tab 0     Sig: Take 1 Tab by mouth three (3) times daily as needed for Anxiety. Max Daily Amount: 0.75 mg.

## 2018-08-06 DIAGNOSIS — F41.9 ANXIETY: ICD-10-CM

## 2018-08-07 NOTE — TELEPHONE ENCOUNTER
From: Glynn Bowden  To: Sushil Barrett MD  Sent: 8/6/2018 11:07 AM EDT  Subject: Medication Renewal Request    Original authorizing provider: MD Selena Mcgill would like a refill of the following medications:  ALPRAZolam Estil Massa) 0.25 mg tablet Sushil Barrett MD]    Preferred pharmacy: [address], [address] [place] [address]    Comment:  I would like a refill for Xanax.

## 2018-08-09 RX ORDER — ALPRAZOLAM 0.25 MG/1
0.25 TABLET ORAL
Qty: 15 TAB | Refills: 0 | Status: SHIPPED | OUTPATIENT
Start: 2018-08-09 | End: 2018-11-28 | Stop reason: SDUPTHER

## 2018-08-09 NOTE — TELEPHONE ENCOUNTER
Rx called into local pharmacy VM on file. Requested Prescriptions     Signed Prescriptions Disp Refills    ALPRAZolam (XANAX) 0.25 mg tablet 15 Tab 0     Sig: Take 1 Tab by mouth three (3) times daily as needed for Anxiety. Max Daily Amount: 0.75 mg.      Authorizing Provider: Elizabeth Leal

## 2018-11-28 DIAGNOSIS — F41.9 ANXIETY: ICD-10-CM

## 2018-11-28 RX ORDER — ALPRAZOLAM 0.25 MG/1
0.25 TABLET ORAL
Qty: 15 TAB | Refills: 0 | Status: SHIPPED | OUTPATIENT
Start: 2018-11-28 | End: 2019-12-27

## 2019-01-08 ENCOUNTER — PATIENT MESSAGE (OUTPATIENT)
Dept: INTERNAL MEDICINE CLINIC | Age: 24
End: 2019-01-08

## 2019-01-08 DIAGNOSIS — F41.9 ANXIETY: ICD-10-CM

## 2019-01-08 RX ORDER — SERTRALINE HYDROCHLORIDE 25 MG/1
25 TABLET, FILM COATED ORAL DAILY
Qty: 30 TAB | Refills: 0 | Status: CANCELLED | OUTPATIENT
Start: 2019-01-08

## 2019-01-08 RX ORDER — SERTRALINE HYDROCHLORIDE 25 MG/1
25 TABLET, FILM COATED ORAL DAILY
Qty: 90 TAB | Refills: 3 | Status: SHIPPED | OUTPATIENT
Start: 2019-01-08 | End: 2020-05-23

## 2019-01-09 NOTE — TELEPHONE ENCOUNTER
From: Shantanu Keys  To: Guy Muse MD  Sent: 1/8/2019 2:41 PM EST  Subject: Prescription Question    Hello,    My pharmacist told me that I needed to request an appointment with you before I get my normal Zoloft prescription refilled, however, I am in the process of switching my insurance over to my husbands' () and it is going to take about two weeks for me to move over. I am currently out of my last refill and I was wondering if you could prescribe just enough of the Zoloft until I can come back in to see you, to talk about it. Please let me know if this is possible.     Thanks

## 2019-07-18 LAB
HBSAG, EXTERNAL: NEGATIVE
HIV, EXTERNAL: NON REACTIVE
RUBELLA, EXTERNAL: NORMAL
T. PALLIDUM, EXTERNAL: NEGATIVE
TYPE, ABO & RH, EXTERNAL: NORMAL

## 2019-12-26 ENCOUNTER — HOSPITAL ENCOUNTER (OUTPATIENT)
Age: 24
Discharge: HOME | End: 2019-12-27
Attending: OBSTETRICS & GYNECOLOGY | Admitting: OBSTETRICS & GYNECOLOGY
Payer: OTHER GOVERNMENT

## 2019-12-26 ENCOUNTER — APPOINTMENT (OUTPATIENT)
Dept: ULTRASOUND IMAGING | Age: 24
End: 2019-12-26
Attending: OBSTETRICS & GYNECOLOGY
Payer: OTHER GOVERNMENT

## 2019-12-26 PROBLEM — N63.21 BREAST LUMP ON LEFT SIDE AT 2 O'CLOCK POSITION: Status: RESOLVED | Noted: 2017-05-07 | Resolved: 2019-12-26

## 2019-12-26 PROBLEM — Z3A.31 31 WEEKS GESTATION OF PREGNANCY: Status: ACTIVE | Noted: 2019-12-26

## 2019-12-26 PROBLEM — O99.013 ANEMIA AFFECTING PREGNANCY IN THIRD TRIMESTER: Status: ACTIVE | Noted: 2019-12-26

## 2019-12-26 PROBLEM — M62.830 BACK MUSCLE SPASM: Status: RESOLVED | Noted: 2018-01-14 | Resolved: 2019-12-26

## 2019-12-26 PROBLEM — M54.6 ACUTE MIDLINE THORACIC BACK PAIN: Status: ACTIVE | Noted: 2019-12-26

## 2019-12-26 LAB
ALBUMIN SERPL-MCNC: 2.8 G/DL (ref 3.5–5)
ALBUMIN SERPL-MCNC: 2.9 G/DL (ref 3.5–5)
ALBUMIN/GLOB SERPL: 0.8 {RATIO} (ref 1.1–2.2)
ALBUMIN/GLOB SERPL: 0.9 {RATIO} (ref 1.1–2.2)
ALP SERPL-CCNC: 49 U/L (ref 45–117)
ALP SERPL-CCNC: 56 U/L (ref 45–117)
ALT SERPL-CCNC: 16 U/L (ref 12–78)
ALT SERPL-CCNC: 34 U/L (ref 12–78)
AMYLASE SERPL-CCNC: 302 U/L (ref 25–115)
AMYLASE SERPL-CCNC: 579 U/L (ref 25–115)
ANION GAP SERPL CALC-SCNC: 8 MMOL/L (ref 5–15)
ANION GAP SERPL CALC-SCNC: 8 MMOL/L (ref 5–15)
AST SERPL-CCNC: 16 U/L (ref 15–37)
AST SERPL-CCNC: 46 U/L (ref 15–37)
BASOPHILS # BLD: 0.1 K/UL (ref 0–0.1)
BASOPHILS NFR BLD: 1 % (ref 0–1)
BILIRUB SERPL-MCNC: 0.2 MG/DL (ref 0.2–1)
BILIRUB SERPL-MCNC: 0.4 MG/DL (ref 0.2–1)
BUN SERPL-MCNC: 6 MG/DL (ref 6–20)
BUN SERPL-MCNC: 9 MG/DL (ref 6–20)
BUN/CREAT SERPL: 11 (ref 12–20)
BUN/CREAT SERPL: 16 (ref 12–20)
CALCIUM SERPL-MCNC: 8.5 MG/DL (ref 8.5–10.1)
CALCIUM SERPL-MCNC: 8.8 MG/DL (ref 8.5–10.1)
CHLORIDE SERPL-SCNC: 107 MMOL/L (ref 97–108)
CHLORIDE SERPL-SCNC: 110 MMOL/L (ref 97–108)
CO2 SERPL-SCNC: 23 MMOL/L (ref 21–32)
CO2 SERPL-SCNC: 24 MMOL/L (ref 21–32)
CREAT SERPL-MCNC: 0.54 MG/DL (ref 0.55–1.02)
CREAT SERPL-MCNC: 0.57 MG/DL (ref 0.55–1.02)
DIFFERENTIAL METHOD BLD: ABNORMAL
EOSINOPHIL # BLD: 0.1 K/UL (ref 0–0.4)
EOSINOPHIL NFR BLD: 1 % (ref 0–7)
ERYTHROCYTE [DISTWIDTH] IN BLOOD BY AUTOMATED COUNT: 12.6 % (ref 11.5–14.5)
ERYTHROCYTE [DISTWIDTH] IN BLOOD BY AUTOMATED COUNT: 12.7 % (ref 11.5–14.5)
GLOBULIN SER CALC-MCNC: 3.4 G/DL (ref 2–4)
GLOBULIN SER CALC-MCNC: 3.4 G/DL (ref 2–4)
GLUCOSE SERPL-MCNC: 114 MG/DL (ref 65–100)
GLUCOSE SERPL-MCNC: 75 MG/DL (ref 65–100)
HCT VFR BLD AUTO: 30.4 % (ref 35–47)
HCT VFR BLD AUTO: 30.5 % (ref 35–47)
HGB BLD-MCNC: 10 G/DL (ref 11.5–16)
HGB BLD-MCNC: 10.4 G/DL (ref 11.5–16)
IMM GRANULOCYTES # BLD AUTO: 0.1 K/UL (ref 0–0.04)
IMM GRANULOCYTES NFR BLD AUTO: 1 % (ref 0–0.5)
LIPASE SERPL-CCNC: 1205 U/L (ref 73–393)
LIPASE SERPL-CCNC: >3000 U/L (ref 73–393)
LYMPHOCYTES # BLD: 2.4 K/UL (ref 0.8–3.5)
LYMPHOCYTES NFR BLD: 20 % (ref 12–49)
MCH RBC QN AUTO: 29.2 PG (ref 26–34)
MCH RBC QN AUTO: 29.9 PG (ref 26–34)
MCHC RBC AUTO-ENTMCNC: 32.8 G/DL (ref 30–36.5)
MCHC RBC AUTO-ENTMCNC: 34.2 G/DL (ref 30–36.5)
MCV RBC AUTO: 87.4 FL (ref 80–99)
MCV RBC AUTO: 89.2 FL (ref 80–99)
MONOCYTES # BLD: 0.7 K/UL (ref 0–1)
MONOCYTES NFR BLD: 6 % (ref 5–13)
NEUTS SEG # BLD: 8.8 K/UL (ref 1.8–8)
NEUTS SEG NFR BLD: 71 % (ref 32–75)
NRBC # BLD: 0 K/UL (ref 0–0.01)
NRBC # BLD: 0 K/UL (ref 0–0.01)
NRBC BLD-RTO: 0 PER 100 WBC
NRBC BLD-RTO: 0 PER 100 WBC
PLATELET # BLD AUTO: 205 K/UL (ref 150–400)
PLATELET # BLD AUTO: 216 K/UL (ref 150–400)
PMV BLD AUTO: 11 FL (ref 8.9–12.9)
PMV BLD AUTO: 11.2 FL (ref 8.9–12.9)
POTASSIUM SERPL-SCNC: 3.5 MMOL/L (ref 3.5–5.1)
POTASSIUM SERPL-SCNC: 3.6 MMOL/L (ref 3.5–5.1)
PROT SERPL-MCNC: 6.2 G/DL (ref 6.4–8.2)
PROT SERPL-MCNC: 6.3 G/DL (ref 6.4–8.2)
RBC # BLD AUTO: 3.42 M/UL (ref 3.8–5.2)
RBC # BLD AUTO: 3.48 M/UL (ref 3.8–5.2)
SODIUM SERPL-SCNC: 138 MMOL/L (ref 136–145)
SODIUM SERPL-SCNC: 142 MMOL/L (ref 136–145)
TRIGL SERPL-MCNC: 144 MG/DL (ref ?–150)
WBC # BLD AUTO: 12.1 K/UL (ref 3.6–11)
WBC # BLD AUTO: 12.5 K/UL (ref 3.6–11)

## 2019-12-26 PROCEDURE — 82150 ASSAY OF AMYLASE: CPT

## 2019-12-26 PROCEDURE — 84478 ASSAY OF TRIGLYCERIDES: CPT

## 2019-12-26 PROCEDURE — 85025 COMPLETE CBC W/AUTO DIFF WBC: CPT

## 2019-12-26 PROCEDURE — 83690 ASSAY OF LIPASE: CPT

## 2019-12-26 PROCEDURE — 76705 ECHO EXAM OF ABDOMEN: CPT

## 2019-12-26 PROCEDURE — 36415 COLL VENOUS BLD VENIPUNCTURE: CPT

## 2019-12-26 PROCEDURE — 80053 COMPREHEN METABOLIC PANEL: CPT

## 2019-12-26 PROCEDURE — 74011250636 HC RX REV CODE- 250/636: Performed by: OBSTETRICS & GYNECOLOGY

## 2019-12-26 PROCEDURE — 99284 EMERGENCY DEPT VISIT MOD MDM: CPT

## 2019-12-26 PROCEDURE — 74011250636 HC RX REV CODE- 250/636: Performed by: NURSE PRACTITIONER

## 2019-12-26 PROCEDURE — 74011250636 HC RX REV CODE- 250/636: Performed by: MIDWIFE

## 2019-12-26 PROCEDURE — 85027 COMPLETE CBC AUTOMATED: CPT

## 2019-12-26 RX ORDER — SODIUM CHLORIDE, SODIUM LACTATE, POTASSIUM CHLORIDE, CALCIUM CHLORIDE 600; 310; 30; 20 MG/100ML; MG/100ML; MG/100ML; MG/100ML
175 INJECTION, SOLUTION INTRAVENOUS CONTINUOUS
Status: DISCONTINUED | OUTPATIENT
Start: 2019-12-26 | End: 2019-12-27 | Stop reason: HOSPADM

## 2019-12-26 RX ORDER — SIMETHICONE 80 MG
80 TABLET,CHEWABLE ORAL
Status: DISCONTINUED | OUTPATIENT
Start: 2019-12-26 | End: 2019-12-27 | Stop reason: HOSPADM

## 2019-12-26 RX ADMIN — SODIUM CHLORIDE, SODIUM LACTATE, POTASSIUM CHLORIDE, AND CALCIUM CHLORIDE 1000 ML: 600; 310; 30; 20 INJECTION, SOLUTION INTRAVENOUS at 05:39

## 2019-12-26 RX ADMIN — SODIUM CHLORIDE, SODIUM LACTATE, POTASSIUM CHLORIDE, AND CALCIUM CHLORIDE 175 ML/HR: 600; 310; 30; 20 INJECTION, SOLUTION INTRAVENOUS at 21:34

## 2019-12-26 RX ADMIN — SODIUM CHLORIDE, SODIUM LACTATE, POTASSIUM CHLORIDE, AND CALCIUM CHLORIDE 175 ML/HR: 600; 310; 30; 20 INJECTION, SOLUTION INTRAVENOUS at 15:23

## 2019-12-26 RX ADMIN — SODIUM CHLORIDE, SODIUM LACTATE, POTASSIUM CHLORIDE, AND CALCIUM CHLORIDE 150 ML/HR: 600; 310; 30; 20 INJECTION, SOLUTION INTRAVENOUS at 06:59

## 2019-12-26 NOTE — PROGRESS NOTES
Pt has now been seen by GI and General Surgery for evaluation of suspected gallstone pancreatitis. Symptomatically feeling better. Pain improved. Plan is to continue NPO with IVF for now per General Surgery. Repeating labs now- CMP, CBC, amylase, and Lipase. Anticipate possibility of cholecystectomy with Gen Surgery in the postpartum period.

## 2019-12-26 NOTE — H&P
History & Physical    Subjective: Lisbeth Barron is a 21 y.o. female  SIUP @ 31w5d by Estimated Date of Delivery: 20 who arrives to L&D with chief complaint of sharp pain starting in mid upper back that radiates down both sides of her body (pt points) from shoulders to hips that woke her up from sleep approximately 45 minutes ago. She took 1,000mg tylenol. She states the pain has resolved except for a brief 30 second recurrence a few minutes ago. She reports a similar incidence around thanksgiving but not as painful. Endorses +FM, intact membranes; Pt denies vaginal bleeding, fever/chills, chest pain, SOB, HA, scotomata, sudden swelling, nor malaise. Pregnancy problems include:   - Body mass index is 31.95 kg/m². -   Patient Active Problem List   Diagnosis Code    Anxiety F41.9    31 weeks gestation of pregnancy Z3A.31    Acute midline thoracic back pain M54.6       Allergies  - No Known Allergies    Prenatal Labs  -unable to access prenatal record    OB History  OB History        1    Para        Term                AB        Living           SAB        TAB        Ectopic        Molar        Multiple        Live Births                     PMHx  Past Medical History:   Diagnosis Date    Anemia     Anxiety 2017    Back muscle spasm 2018    Breast lump on left side at 2 o'clock position 2017    Psychiatric problem     Anxiety, no current medications        PSHx  History reviewed. No pertinent surgical history. F/SHx  History reviewed. No pertinent family history.    Social History     Socioeconomic History    Marital status: SINGLE     Spouse name: Not on file    Number of children: Not on file    Years of education: Not on file    Highest education level: Not on file   Occupational History    Not on file   Social Needs    Financial resource strain: Not on file    Food insecurity:     Worry: Not on file     Inability: Not on file   [employer] needs: Medical: Not on file     Non-medical: Not on file   Tobacco Use    Smoking status: Never Smoker    Smokeless tobacco: Never Used   Substance and Sexual Activity    Alcohol use: Not Currently     Comment: occasional     Drug use: No    Sexual activity: Yes     Partners: Male       Home Medications:  Prior to Admission Medications   Prescriptions Last Dose Informant Patient Reported? Taking? ALPRAZolam (XANAX) 0.25 mg tablet Not Taking at Unknown time  No No   Sig: Take 1 Tab by mouth three (3) times daily as needed for Anxiety. Max Daily Amount: 0.75 mg. RABEprazole (ACIPHEX) 20 mg tablet Not Taking at Unknown time  No No   Sig: Take 1 Tab by mouth daily as needed. Indications: HEARTBURN   ferrous sulfate (SLOW FE PO) 12/25/2019 at Unknown time  Yes Yes   Sig: Take 1 Tab by mouth.   prenatal 70/BXYJ fum/folic/dha (PRENATAL-1 PO) 12/25/2019 at Unknown time  Yes Yes   Sig: Take 1 Tab by mouth daily. sertraline (ZOLOFT) 25 mg tablet Not Taking at Unknown time  No No   Sig: Take 1 Tab by mouth daily. Facility-Administered Medications: None        Review of Systems:  A comprehensive review of systems was negative except for that written in the History of Present Illness. Objective:     Vitals:    12/26/19 0341 12/26/19 0342   BP: 103/68    Pulse: (!) 120    Resp: 14    Weight:  87.1 kg (192 lb)   Height:  5' 5\" (1.651 m)      Body mass index is 31.95 kg/m². Physical Exam:  General:  Alert, cooperative, no distress, appears stated age. Lungs:   Clear to auscultation bilaterally. Symmetrical expansion, non-labored   Heart:  Regular rate and rhythm   Abdomen:   Soft, non-tender. Gravid. Extremities: Extremities normal, atraumatic, no cyanosis or edema.    Skin: Skin color, texture, turgor normal. No rashes or lesions     Back: unable to reproduce pain with palpation along entire back, no flank pain  Membrane Status: Intact        Electronic Fetal Monitoring    Fetal Heart Rate:      Patient Vitals for the past 4 hrs: Mode Fetal Heart Rate Fetal Activity Variability Decelerations Accelerations FHR Interpretation Provider who reviewed strip? Non Stress Test   19 0411 External 145 Present 6-25 BPM None Yes Category I R UDAY manning Reactive     Uterine Contractions: none; relaxed to palpation      Assessment:   21 y.o. female  SIUP @  31w5 d by TONG of Estimated Date of Delivery: 20      Pregnancy problems include:   -   Patient Active Problem List   Diagnosis Code    Anxiety F41.9    31 weeks gestation of pregnancy Z3A.31    Acute midline thoracic back pain M54.6     - Body mass index is 31.95 kg/m². EFM:   - Category 1  - NST: Fetal NST Findings: reactive    Plan:   Admit to L&D for Outpatient for thoracic back pain    Differential dx, rule out: pancreatitis, cholecystitis, muscle spasm (thoracic), gas pain   Tests/Labs/Monitoring ordered: Electronic Fetal Monitoring NST, CBC w/o DIF, Sample to blood bank (hold), COMPMET and Amylase, Lipase  Anemia, per CBC (hgb 10.4)    Unable to review of prenatal records  Patient Education:   - Pain management, as indicated; Tylenol, calcium magnesium for muscle relaxation, hot bath/shower    CNM management    Patient has been counseled regarding this plan of care and is in agreement; all questions answered. Collaborative MD: Azeb Umana      Signed By:  Edwin Pendleton CNM      2019 4:17 AM     UPDATE:  After review of labs which showed significantly elevated amylase and lipase, transferred care to Dr. Azeb Umana.   Edwin Pendleton CNM  19  5:32 AM

## 2019-12-26 NOTE — CONSULTS
[facility] at [address] Consultation      Admit Date: 12/26/2019  Reason for Consultation: gallstone pancreatitis    HPI:  Estrada Caceres is a 21 y.o. female whom we are asked to see in consultation for gallstone pancreatitis. She is 31w5d pregnant with estimated delivery date of 2/22/20. She came to the ED for evaluation after being awakened this morning at 3 am with sharp pain in her mid upper back. She took 1,000 mg of tylenol, which improved her pain. She had similar symptoms around [facility], but it was not as severe and she did not seek further evaluation. In the ED she was found to have lipase >3,000 and amylase 579. T bili 0.2, LFTs WNL. WBC 12.5, afebrile. US 12/26/19 IMPRESSION  Impression: Cholelithiasis without evidence to suggest acute cholecystitis. Mild  dilatation of the right collecting system may be related to known pregnancy. Poor visualization of the pancreas. Patient Active Problem List    Diagnosis Date Noted    31 weeks gestation of pregnancy 12/26/2019    Acute midline thoracic back pain 12/26/2019    Anemia affecting pregnancy in third trimester 12/26/2019    Anxiety 05/07/2017     Past Medical History:   Diagnosis Date    Anemia     Anemia affecting pregnancy in third trimester 12/26/2019    Anxiety 5/7/2017    Back muscle spasm 1/14/2018    Breast lump on left side at 2 o'clock position 5/7/2017    Psychiatric problem     Anxiety, no current medications      History reviewed. No pertinent surgical history. Social History     Tobacco Use    Smoking status: Never Smoker    Smokeless tobacco: Never Used   Substance Use Topics    Alcohol use: Not Currently     Comment: occasional       History reviewed. No pertinent family history. Prior to Admission medications    Medication Sig Start Date End Date Taking? Authorizing Provider   prenatal 63/MGKE fum/folic/dha (PRENATAL-1 PO) Take 1 Tab by mouth daily.    Yes Provider, Historical   ferrous sulfate (SLOW FE PO) Take 1 Tab by mouth. Yes Provider, Historical   sertraline (ZOLOFT) 25 mg tablet Take 1 Tab by mouth daily. 19   Lyric Resendez MD   ALPRAZolam Destiny Bliss) 0.25 mg tablet Take 1 Tab by mouth three (3) times daily as needed for Anxiety. Max Daily Amount: 0.75 mg. 18   Lyric Resendez MD   RABEprazole (ACIPHEX) 20 mg tablet Take 1 Tab by mouth daily as needed. Indications: HEARTBURN 17   Lyric Resendez MD     Current Facility-Administered Medications   Medication Dose Route Frequency    simethicone (MYLICON) tablet 80 mg  80 mg Oral QID PRN    lactated Ringers infusion  175 mL/hr IntraVENous CONTINUOUS     No Known Allergies       Subjective:     Review of Systems:    A comprehensive review of systems was negative except for that written in the History of Present Illness. Objective:     Blood pressure 108/71, pulse (!) 101, temperature 98.2 °F (36.8 °C), resp. rate 16, height 5' 5\" (1.651 m), weight 87.1 kg (192 lb), SpO2 99 %, not currently breastfeeding. Temp (24hrs), Av.1 °F (36.7 °C), Min:97.9 °F (36.6 °C), Max:98.2 °F (36.8 °C)      Recent Labs     19  0419   WBC 12.5*   HGB 10.4*   HCT 30.4*        Recent Labs     19  0419      K 3.5      CO2 23   *   BUN 9   CREA 0.57   CA 8.8   ALB 2.9*   TBILI 0.2   SGOT 16   ALT 16     Recent Labs     19  0419   *   LPSE >3,000*       No intake or output data in the 24 hours ending 19 1353    _____________________  Physical Exam:     General:  Alert, cooperative, no distress, appears stated age. Eyes:   PERRL, EOMs intact. Sclera clear. Throat: Lips, mucosa, and tongue normal.   Neck: Supple, symmetrical, trachea midline. Lungs:   Clear to auscultation bilaterally. Heart:  Regular rate and rhythm. Abdomen:   Normal BS, flat, Soft, non-tender. No masses,  No organomegaly.    Extremities: Extremities normal, atraumatic, no cyanosis or edema. Skin: Skin color, texture, turgor normal. No rashes or lesions. Assessment:   Principal Problem:    Acute midline thoracic back pain (12/26/2019)    Active Problems:    31 weeks gestation of pregnancy (12/26/2019)      Anemia affecting pregnancy in third trimester (12/26/2019)    gallstone pancreatitis        Plan:     D/W Dr. López Joiner  She currently has no symptoms of acute pancreatitis. Would defer surgery until after delivery, as this would be a prophylactic procedure. .  Following. Thank you for allowing us to participate in the care of this patient. Total time spent with patient: 18 minutes. Signed By: Johnson Sutton NP     December 26, 2019      Pt seen and examined  Sudden onset of abdominal pain . Workup revealed acute gallstones pancreatitis. She is feeling a little better from this. Gen- Alert in NAD  Lungs-CTA  H-RRR   Abd-Soft and gravid. Minimal tenderness in epigastric region  Lipase >3000  Gallstone pancreatitis  Would wait until after delivery for Lap Alaina.

## 2019-12-26 NOTE — PROGRESS NOTES
1315 - Bedside and Verbal shift change report given to Janeth Auguste RN (oncoming nurse) by Ernst Blade. Marylene Drones, RN (offgoing nurse). Report included the following information SBAR, MAR and Recent Results. 1445 - Patient feeling much better. No pain at this time or since 0600. She states she saw a surgery NP and gastro NP.    1510 - Dr. Sullivan Hemanth in to see patient. 1 - Dr. Lama Brain in to see patient. Orders received for labs now. \    1600 - Labs drawn & sent. Wheelchair to [address] [facility] REPORT:    Verbal report given to LISA Hayden RN(name) on Selena Sierra  being transferred to [facility]) for routine progression of care       Report consisted of patients Situation, Background, Assessment and   Recommendations(SBAR). Information from the following report(s) SBAR, MAR and Recent Results was reviewed with the receiving nurse. Lines:   Peripheral IV 12/26/19 Left;Posterior Hand (Active)   Site Assessment Clean, dry, & intact 12/26/2019  5:39 AM   Phlebitis Assessment 0 12/26/2019  5:39 AM   Infiltration Assessment 0 12/26/2019  5:39 AM   Dressing Status Clean, dry, & intact 12/26/2019  5:39 AM   Dressing Type Transparent;Tape 12/26/2019  5:39 AM   Hub Color/Line Status Pink; Infusing 12/26/2019  5:39 AM        Opportunity for questions and clarification was provided.       Patient transported with:   Registered Nurse

## 2019-12-26 NOTE — PROGRESS NOTES
TRANSFER - IN REPORT:    Verbal report received from [facility] RN(name) on Selena Sierra  being received from L&D(unit) for routine progression of care      Report consisted of patients Situation, Background, Assessment and   Recommendations(SBAR). Information from the following report(s) SBAR, Kardex, Intake/Output, MAR, Accordion, Recent Results and Med Rec Status was reviewed with the receiving nurse. Opportunity for questions and clarification was provided. Assessment completed upon patients arrival to unit and care assumed. 26- Paging on-call gastro regarding diet order.

## 2019-12-26 NOTE — PROGRESS NOTES
Bedside, Verbal and Written shift change report given to RALPH Croft (oncoming nurse) by JONES Gaitan RN (offgoing nurse). Report included the following information SBAR, Kardex, Intake/Output, MAR and Recent Results.      @1200 received orders to transfer pt to [facility];

## 2019-12-26 NOTE — PROGRESS NOTES
Problem: Patient Education: Go to Patient Education Activity  Goal: Patient/Family Education  Outcome: Progressing Towards Goal     Problem: Antepartum: Day 1 through Discharge  Goal: Off Pathway (Use only if patient is Off Pathway)  Outcome: Progressing Towards Goal  Goal: Activity/Safety  Outcome: Progressing Towards Goal  Goal: Consults, if ordered  Outcome: Progressing Towards Goal  Goal: Diagnostic Test/Procedures  Outcome: Progressing Towards Goal  Goal: Nutrition/Diet  Outcome: Progressing Towards Goal  Goal: Discharge Planning  Outcome: Progressing Towards Goal  Goal: Medications  Outcome: Progressing Towards Goal  Goal: Treatments/Interventions/Procedures  Outcome: Progressing Towards Goal  Goal: Psychosocial  Outcome: Progressing Towards Goal  Goal: *Vital signs within defined limits  Outcome: Progressing Towards Goal  Goal: *Labs within defined limits  Outcome: Progressing Towards Goal  Goal: *Hemodynamically stable  Outcome: Progressing Towards Goal  Goal: *Optimal pain control at patient's stated goal  Outcome: Progressing Towards Goal  Goal: *Tolerating diet  Outcome: Progressing Towards Goal  Goal: *Performs self perineal care  Outcome: Progressing Towards Goal  Goal: *Reassuring fetal surveillance  Outcome: Progressing Towards Goal  Goal: *Able to cope  Outcome: Progressing Towards Goal  Goal: *Absence of injury  Outcome: Progressing Towards Goal  Goal: *Free from active signs of labor  Outcome: Progressing Towards Goal     Problem: Antepartum: Discharge Outcomes  Goal: *Free from active signs of labor  Outcome: Progressing Towards Goal  Goal: *Absence of injury  Outcome: Progressing Towards Goal  Goal: *Able to cope  Outcome: Progressing Towards Goal  Goal: *Reassuring fetal surveillance  Outcome: Progressing Towards Goal  Goal: *Describes available resources and support systems  Outcome: Progressing Towards Goal  Goal: *No signs and symptoms of infection  Outcome: Progressing Towards Goal  Goal: *Received and verbalizes understanding of discharge plan and instructions  Outcome: Progressing Towards Goal  Goal: *Vital signs within defined limits  Outcome: Progressing Towards Goal  Goal: *Labs within defined limits  Outcome: Progressing Towards Goal  Goal: *Hemodynamically stable  Outcome: Progressing Towards Goal  Goal: *Optimal pain control at patient's stated goal  Outcome: Progressing Towards Goal  Goal: *Demonstrates progressive activity  Outcome: Progressing Towards Goal  Goal: *Tolerating diet  Outcome: Progressing Towards Goal  Goal: *Verbalizes name, dosage, time, side effects, and number of days to continue medications  Outcome: Progressing Towards Goal     Problem: Pain  Goal: *Control of Pain  Outcome: Progressing Towards Goal     Problem: Patient Education: Go to Patient Education Activity  Goal: Patient/Family Education  Outcome: Progressing Towards Goal     Problem: Pain  Goal: *Control of Pain  Outcome: Progressing Towards Goal     Problem: Patient Education: Go to Patient Education Activity  Goal: Patient/Family Education  Outcome: Progressing Towards Goal     Problem: Pressure Injury - Risk of  Goal: *Prevention of pressure injury  Description  Document Don Scale and appropriate interventions in the flowsheet. Outcome: Progressing Towards Goal  Note: Pressure Injury Interventions:             Activity Interventions: Increase time out of bed                               Problem: Patient Education: Go to Patient Education Activity  Goal: Patient/Family Education  Outcome: Progressing Towards Goal     Problem: Discharge Planning  Goal: *Discharge to safe environment  Outcome: Progressing Towards Goal  Goal: *Knowledge of medication management  Outcome: Progressing Towards Goal  Goal: *Knowledge of discharge instructions  Outcome: Progressing Towards Goal     Problem: Patient Education: Go to Patient Education Activity  Goal: Patient/Family Education  Outcome: Progressing Towards Goal

## 2019-12-26 NOTE — PROGRESS NOTES
AntePartum Progress Note    Selena Sierra  31w5d    Patient admitted for abdominal pain, suspected pancreatitis 31w5d Estimated Date of Delivery: 20 states she does have mild abdominal pain. Vitals:    Patient Vitals for the past 24 hrs:   BP Temp Pulse Resp SpO2 Height Weight   19 0746 108/71 98.2 °F (36.8 °C) (!) 101 16 99 % -- --   19 0548 119/71 97.9 °F (36.6 °C) 99 14 -- -- --   19 0342 -- -- -- -- -- 5' 5\" (1.651 m) 87.1 kg (192 lb)   19 0341 103/68 -- (!) 120 14 -- -- --     Temp (24hrs), Av.1 °F (36.7 °C), Min:97.9 °F (36.6 °C), Max:98.2 °F (36.8 °C)    I&O:   No intake/output data recorded. No intake/output data recorded. NST:  Reactive  Uterine Activity: None    Exam:  Patient without distress.                Abdomen soft, non-tender               Fundus soft and non tender               Lower extremities edema No                                           Labs:   Recent Results (from the past 24 hour(s))   AMYLASE    Collection Time: 19  4:19 AM   Result Value Ref Range    Amylase 579 (H) 25 - 115 U/L   LIPASE    Collection Time: 19  4:19 AM   Result Value Ref Range    Lipase >3,000 (H) 73 - 393 U/L   CBC W/O DIFF    Collection Time: 19  4:19 AM   Result Value Ref Range    WBC 12.5 (H) 3.6 - 11.0 K/uL    RBC 3.48 (L) 3.80 - 5.20 M/uL    HGB 10.4 (L) 11.5 - 16.0 g/dL    HCT 30.4 (L) 35.0 - 47.0 %    MCV 87.4 80.0 - 99.0 FL    MCH 29.9 26.0 - 34.0 PG    MCHC 34.2 30.0 - 36.5 g/dL    RDW 12.6 11.5 - 14.5 %    PLATELET 008 025 - 383 K/uL    MPV 11.0 8.9 - 12.9 FL    NRBC 0.0 0  WBC    ABSOLUTE NRBC 0.00 0.00 - 4.62 K/uL   METABOLIC PANEL, COMPREHENSIVE    Collection Time: 19  4:19 AM   Result Value Ref Range    Sodium 138 136 - 145 mmol/L    Potassium 3.5 3.5 - 5.1 mmol/L    Chloride 107 97 - 108 mmol/L    CO2 23 21 - 32 mmol/L    Anion gap 8 5 - 15 mmol/L    Glucose 114 (H) 65 - 100 mg/dL    BUN 9 6 - 20 MG/DL    Creatinine 0.57 0.55 - 1.02 MG/DL    BUN/Creatinine ratio 16 12 - 20      GFR est AA >60 >60 ml/min/1.73m2    GFR est non-AA >60 >60 ml/min/1.73m2    Calcium 8.8 8.5 - 10.1 MG/DL    Bilirubin, total 0.2 0.2 - 1.0 MG/DL    ALT (SGPT) 16 12 - 78 U/L    AST (SGOT) 16 15 - 37 U/L    Alk. phosphatase 49 45 - 117 U/L    Protein, total 6.3 (L) 6.4 - 8.2 g/dL    Albumin 2.9 (L) 3.5 - 5.0 g/dL    Globulin 3.4 2.0 - 4.0 g/dL    A-G Ratio 0.9 (L) 1.1 - 2.2         Assessment and Plan:   IUP @ 31w5d   Patient Active Problem List    Diagnosis Date Noted    31 weeks gestation of pregnancy 12/26/2019    Acute midline thoracic back pain 12/26/2019    Anemia affecting pregnancy in third trimester 12/26/2019    Anxiety 05/07/2017     Mild acute pancreatitis in the 3rd T of pregnancy. Hospital discussed case with Dr. Viraj Santillan ([facility]) earlier who is in agreement with diagnosis at this point and supportive management with aggressive IV hydration and repeat labs in 12 hours. Will obtain an abdominal ultrasound which pt is going to get now. Will also consider consulting GI or Medicine Hospitalist for support as needed. Awaiting results of imaging. No OB issues at this time. Time spent with patient and her  discussing the diagnosis, recommended imaging, and supportive management.

## 2019-12-26 NOTE — PROGRESS NOTES
0730: Bedside, Verbal and Written shift change report given to JONES Thomas (oncoming nurse) by Ronen Lundberg RN (offgoing nurse). Report included the following information SBAR, MAR and Recent Results. 1894: Patient taken to ultrasound. 8744: Consult called to [facility]. Spoke with them on the phone and they are aware consult was ordered. 1130: Bedside, Verbal and Written shift change report given to Shorty Saravia (oncoming nurse) by Tamera Campo (offgoing nurse). Report included the following information SBAR, MAR and Recent Results.

## 2019-12-26 NOTE — PROGRESS NOTES
12/26/2019  3:35 AM Patient arrived to [facility] with back pain. Patient states 10/10 pain In her upper back that began around 300 this morning. Patient states she took Tylenol about 10min before she came into the hospital. Patient reports positive fetal movement and denies any bleeding or loss of fluid. Noemi Son notified of patient's arrival.      Nancy Jensen CNM at bedside to see patient and discuss plan of care. FHR tracing reviewed, patient may come off monitors. 2952 Dr Brina Friend at bedside to see patient. Plan of care discussed. Will wait for further orders once plan is discussed with MFM.    4445 Bedside and Verbal shift change report given to Matt RN (oncoming nurse) by Jackeline RN (offgoing nurse). Report included the following information SBAR, Kardex, Intake/Output, MAR, Accordion, Recent Results and Med Rec Status.

## 2019-12-26 NOTE — CONSULTS
[address]  [address] [place]  [address], [address]  [phone]                     GI CONSULTATION NOTE  Elliot Corado, [facility]  Work Cell: [phone]      NAME:  Kiko Villa   :   1995   MRN:   [id number]       Referring Provider: Dr. Ashley Awan Date: 2019     Chief Complaint: Back oain    History of Present Illness:  Patient is a 21 y.o. who is seen in consultation at the request of Dr. Calvin Campos for pancreatitis. Ms. Rigoberto Cabello has a PMH as detailed below. She is 31w5d pregnant whom presented to the hospital with back pain. Onset of this was yesterday evening. Pain started in her mid-back and radiated to her right flank. It was constant, sharp and severe w/ associated nausea. No vomiting. She had prior episode of these symptoms around  for which she took Tylenol with resolution. Work-up here revealed elevated WBC of 12.5, amylase 579 and lipase > 3000. LFTs normal. Abd ultrasound demonstrating gallstones. No prior hx of pancreatitis. She denies any tobacco use. Reports occasional alcohol use, but none since she has been pregnant. Today, she is feeling better. PMH:  Past Medical History:   Diagnosis Date    Anemia     Anemia affecting pregnancy in third trimester 2019    Anxiety 2017    Back muscle spasm 2018    Breast lump on left side at 2 o'clock position 2017    Psychiatric problem     Anxiety, no current medications       PSH:  History reviewed. No pertinent surgical history. Allergies:  No Known Allergies    Home Medications:  Prior to Admission Medications   Prescriptions Last Dose Informant Patient Reported? Taking? ALPRAZolam (XANAX) 0.25 mg tablet Not Taking at Unknown time  No No   Sig: Take 1 Tab by mouth three (3) times daily as needed for Anxiety. Max Daily Amount: 0.75 mg. RABEprazole (ACIPHEX) 20 mg tablet Not Taking at Unknown time  No No   Sig: Take 1 Tab by mouth daily as needed. Indications: HEARTBURN   ferrous sulfate (SLOW FE PO) 12/25/2019 at Unknown time  Yes Yes   Sig: Take 1 Tab by mouth.   prenatal 37/UYHQ fum/folic/dha (PRENATAL-1 PO) 12/25/2019 at Unknown time  Yes Yes   Sig: Take 1 Tab by mouth daily. sertraline (ZOLOFT) 25 mg tablet Not Taking at Unknown time  No No   Sig: Take 1 Tab by mouth daily. Facility-Administered Medications: None       Hospital Medications:  Current Facility-Administered Medications   Medication Dose Route Frequency    simethicone (MYLICON) tablet 80 mg  80 mg Oral QID PRN    lactated Ringers infusion  175 mL/hr IntraVENous CONTINUOUS       Social History:  Social History     Tobacco Use    Smoking status: Never Smoker    Smokeless tobacco: Never Used   Substance Use Topics    Alcohol use: Not Currently     Comment: occasional        Family History:  History reviewed. No pertinent family history. Review of Systems:    Constitutional: negative fever, negative chills, negative weight loss  Eyes:   negative visual changes  ENT:   negative sore throat, tongue or lip swelling  Respiratory:  negative cough, negative dyspnea  Cards:  negative for chest pain, palpitations, lower extremity edema  GI:   See HPI  :  negative for frequency, dysuria  Integument:  negative for rash and pruritus  Heme:  negative for easy bruising and gum/nose bleeding  Musculoskel: negative for myalgias, back pain and muscle weakness  Neuro: negative for headaches, dizziness, vertigo  Psych:  negative for feelings of anxiety, depression      Objective:     Patient Vitals for the past 8 hrs:   BP Temp Pulse Resp SpO2 Height Weight   12/26/19 0746 108/71 98.2 °F (36.8 °C) (!) 101 16 99 % -- --   12/26/19 0548 119/71 97.9 °F (36.6 °C) 99 14 -- -- --   12/26/19 0342 -- -- -- -- -- 5' 5\" (1.651 m) 87.1 kg (192 lb)   12/26/19 0341 103/68 -- (!) 120 14 -- -- --     No intake/output data recorded. No intake/output data recorded. PHYSICAL EXAM:  General: WD, WN.  Alert, cooperative, no acute distress.    HEENT: NC, Atraumatic. PERRLA, EOMI. Anicteric sclerae. Lungs:  CTA Bilaterally. No Wheezing/Rhonchi/Rales. Heart:  Regular rate and rhythm, No murmur, No Rubs, No Gallops  Abdomen: Soft, non-distended, non-tender, gravid abdomen.  +Bowel sounds, no HSM  Extremities: No c/c/e  Neurologic:  Alert and oriented X 3. No acute neurological distress. Psych:   Good insight. Not anxious nor agitated. Data Review     Recent Labs     12/26/19 0419   WBC 12.5*   HGB 10.4*   HCT 30.4*        Recent Labs     12/26/19 0419      K 3.5      CO2 23   BUN 9   CREA 0.57   *   CA 8.8     Recent Labs     12/26/19 0419   SGOT 16   AP 49   TP 6.3*   ALB 2.9*   GLOB 3.4   *   LPSE >3,000*     No results for input(s): INR, PTP, APTT, INREXT in the last 72 hours. Imaging studies reviewed      Assessment:   1. Acute pancreatitis - first episode w/ suspected biliary etiology. Ultrasound demonstrating gallstones w/o any biliary dilatation. LFTs normal.   2. Pregnancy   3. Anemia  4.  Anxiety     Patient Active Problem List   Diagnosis Code    Anxiety F41.9    31 weeks gestation of pregnancy Z3A.31    Acute midline thoracic back pain M54.6    Anemia affecting pregnancy in third trimester O99.013              Plan:   -NPO w/ sips of clears  -Supportive management per primary team  -Trend labs  -Surgery consult   -Discussed with Dr. Dodie Jones   -Will follow   -Thank you kindly for allowing us to participate in the care of this patient

## 2019-12-27 VITALS
RESPIRATION RATE: 16 BRPM | OXYGEN SATURATION: 98 % | DIASTOLIC BLOOD PRESSURE: 51 MMHG | BODY MASS INDEX: 31.99 KG/M2 | SYSTOLIC BLOOD PRESSURE: 107 MMHG | HEIGHT: 65 IN | TEMPERATURE: 98 F | WEIGHT: 192 LBS | HEART RATE: 85 BPM

## 2019-12-27 LAB
ALBUMIN SERPL-MCNC: 2.4 G/DL (ref 3.5–5)
ALBUMIN/GLOB SERPL: 0.7 {RATIO} (ref 1.1–2.2)
ALP SERPL-CCNC: 48 U/L (ref 45–117)
ALT SERPL-CCNC: 25 U/L (ref 12–78)
AMYLASE SERPL-CCNC: 108 U/L (ref 25–115)
ANION GAP SERPL CALC-SCNC: 5 MMOL/L (ref 5–15)
AST SERPL-CCNC: 27 U/L (ref 15–37)
BASOPHILS # BLD: 0.1 K/UL (ref 0–0.1)
BASOPHILS NFR BLD: 1 % (ref 0–1)
BILIRUB SERPL-MCNC: 0.3 MG/DL (ref 0.2–1)
BUN SERPL-MCNC: 5 MG/DL (ref 6–20)
BUN/CREAT SERPL: 10 (ref 12–20)
CALCIUM SERPL-MCNC: 8.4 MG/DL (ref 8.5–10.1)
CHLORIDE SERPL-SCNC: 108 MMOL/L (ref 97–108)
CO2 SERPL-SCNC: 24 MMOL/L (ref 21–32)
CREAT SERPL-MCNC: 0.51 MG/DL (ref 0.55–1.02)
DIFFERENTIAL METHOD BLD: ABNORMAL
EOSINOPHIL # BLD: 0.1 K/UL (ref 0–0.4)
EOSINOPHIL NFR BLD: 1 % (ref 0–7)
ERYTHROCYTE [DISTWIDTH] IN BLOOD BY AUTOMATED COUNT: 12.7 % (ref 11.5–14.5)
GLOBULIN SER CALC-MCNC: 3.6 G/DL (ref 2–4)
GLUCOSE SERPL-MCNC: 83 MG/DL (ref 65–100)
HCT VFR BLD AUTO: 27.7 % (ref 35–47)
HGB BLD-MCNC: 9.3 G/DL (ref 11.5–16)
IMM GRANULOCYTES # BLD AUTO: 0 K/UL (ref 0–0.04)
IMM GRANULOCYTES NFR BLD AUTO: 0 % (ref 0–0.5)
LIPASE SERPL-CCNC: 251 U/L (ref 73–393)
LYMPHOCYTES # BLD: 2.3 K/UL (ref 0.8–3.5)
LYMPHOCYTES NFR BLD: 23 % (ref 12–49)
MCH RBC QN AUTO: 29.8 PG (ref 26–34)
MCHC RBC AUTO-ENTMCNC: 33.6 G/DL (ref 30–36.5)
MCV RBC AUTO: 88.8 FL (ref 80–99)
MONOCYTES # BLD: 0.7 K/UL (ref 0–1)
MONOCYTES NFR BLD: 7 % (ref 5–13)
NEUTS SEG # BLD: 6.9 K/UL (ref 1.8–8)
NEUTS SEG NFR BLD: 68 % (ref 32–75)
NRBC # BLD: 0 K/UL (ref 0–0.01)
NRBC BLD-RTO: 0 PER 100 WBC
PLATELET # BLD AUTO: 189 K/UL (ref 150–400)
PMV BLD AUTO: 11.1 FL (ref 8.9–12.9)
POTASSIUM SERPL-SCNC: 3.6 MMOL/L (ref 3.5–5.1)
PROT SERPL-MCNC: 6 G/DL (ref 6.4–8.2)
RBC # BLD AUTO: 3.12 M/UL (ref 3.8–5.2)
SODIUM SERPL-SCNC: 137 MMOL/L (ref 136–145)
WBC # BLD AUTO: 10.1 K/UL (ref 3.6–11)

## 2019-12-27 PROCEDURE — 36415 COLL VENOUS BLD VENIPUNCTURE: CPT

## 2019-12-27 PROCEDURE — 83690 ASSAY OF LIPASE: CPT

## 2019-12-27 PROCEDURE — 82150 ASSAY OF AMYLASE: CPT

## 2019-12-27 PROCEDURE — 74011250636 HC RX REV CODE- 250/636: Performed by: NURSE PRACTITIONER

## 2019-12-27 PROCEDURE — 85025 COMPLETE CBC W/AUTO DIFF WBC: CPT

## 2019-12-27 PROCEDURE — 59025 FETAL NON-STRESS TEST: CPT

## 2019-12-27 PROCEDURE — 80053 COMPREHEN METABOLIC PANEL: CPT

## 2019-12-27 RX ORDER — SIMETHICONE 80 MG
80 TABLET,CHEWABLE ORAL
Qty: 30 TAB | Refills: 1 | Status: SHIPPED | OUTPATIENT
Start: 2019-12-27 | End: 2020-05-23

## 2019-12-27 RX ADMIN — SODIUM CHLORIDE, SODIUM LACTATE, POTASSIUM CHLORIDE, AND CALCIUM CHLORIDE 175 ML/HR: 600; 310; 30; 20 INJECTION, SOLUTION INTRAVENOUS at 03:14

## 2019-12-27 RX ADMIN — SODIUM CHLORIDE, SODIUM LACTATE, POTASSIUM CHLORIDE, AND CALCIUM CHLORIDE 175 ML/HR: 600; 310; 30; 20 INJECTION, SOLUTION INTRAVENOUS at 08:48

## 2019-12-27 NOTE — DISCHARGE INSTRUCTIONS
Patient Education   Call [phone] to schedule Surgery follow-up appointment with Dr. Kaz Ureña after delivery. Biliary Colic: Care Instructions  Your Care Instructions    Biliary (say \"BILL-ee-air-ee\") colic is belly pain caused by gallbladder problems. It is usually caused by a gallstone moving through or blocking the common bile duct or cystic duct. Gallstones are stones that form in the gallbladder. They are made of cholesterol and other substances. The gallbladder is a small sac located just under the liver. It stores bile released by the liver. Bile helps you digest fats. Gallstones also can form in the common bile duct or cystic duct. These ducts carry bile from the gallbladder and the liver to the small intestine. Gallstones may be as small as a grain of sand or as large as a golf ball. Gallstones that cause severe symptoms usually are treated with surgery to remove the gallbladder. If the first attack of biliary colic is mild, it is often safe to wait until you have had another attack before you think about having surgery. The doctor has checked you carefully, but problems can develop later. If you notice any problems or new symptoms, get medical treatment right away. Follow-up care is a key part of your treatment and safety. Be sure to make and go to all appointments, and call your doctor if you are having problems. It's also a good idea to know your test results and keep a list of the medicines you take. How can you care for yourself at home? · Take pain medicines exactly as directed. ? If the doctor gave you a prescription medicine for pain, take it as prescribed. ? If you are not taking a prescription pain medicine, ask your doctor if you can take an over-the-counter medicine. Read and follow all instructions on the label. · Avoid foods that cause symptoms, especially fatty foods. These can cause biliary colic. · You may need more tests to look at your gallbladder.   When should you call for help?  Call your doctor now or seek immediate medical care if:    · You have a fever.     · You have new belly pain, or your pain gets worse.     · There is a new or increasing yellow tint to your skin or the whites of your eyes.     · Your urine is dark yellow-brown, or your stools are light-colored or white.     · You cannot keep down fluids.    Watch closely for changes in your health, and be sure to contact your doctor if:    · You do not get better as expected.     · You are not getting better after 1 day (24 hours). Where can you learn more? Go to http://antonella-cynthia.info/. Enter A944 in the search box to learn more about \"Biliary Colic: Care Instructions. \"  Current as of: November 7, 2018  Content Version: 12.2  © 3187-7291 [employer]. Care instructions adapted under license by [employer] (which disclaims liability or warranty for this information). If you have questions about a medical condition or this instruction, always ask your healthcare professional. [address] any warranty or liability for your use of this information.

## 2019-12-27 NOTE — PROGRESS NOTES
Bedside and Verbal shift change report given to LISA Alarcon RN (oncoming nurse) by AYAN Jeffries RN (offgoing nurse). Report included the following information SBAR, Kardex, Intake/Output, MAR and Recent Results. 1600 Patient able to tolerate GI Lite diet - eating 95% of her lunch. Adequate po intake. Patient states she is feeling much better and wants to go home. Patient reports +FM and is not having any cramping/uc's. Discharge instructions given and reviewed with patient. All questions answered. No Rxs given. Patient is to keep her Monday appointment with Dr. Jesenia Felipe. She is to follow up with surgeon for elective cholecystectomy post partum. She will make that appointment after she delivers.  here to take patient home.

## 2019-12-27 NOTE — DISCHARGE SUMMARY
Antepartum Discharge Summary     Name: Estrada Caceres MRN: [id number]  SSN: [id number]    YOB: 1995  Age: 21 y.o. Sex: female      Allergies: Patient has no known allergies. Admit Date: 12/26/2019    Discharge Date: 12/27/2019     Admitting Physician: Lilibeth Lorenzana MD     Attending Physician:  Hannah Geiger MD     * Admission Diagnoses:   Pancreatitis likely secondary to gallstones  31w5d IUP    * Discharge Diagnoses:   Hospital Problems as of 12/27/2019 Date Reviewed: 12/26/2019          Codes Class Noted - Resolved POA    31 weeks gestation of pregnancy ICD-10-CM: Z3A.31  ICD-9-CM: V22.2  12/26/2019 - Present Yes        * (Principal) Acute midline thoracic back pain ICD-10-CM: M54.6  ICD-9-CM: 724.1  12/26/2019 - Present Yes        Anemia affecting pregnancy in third trimester ICD-10-CM: O99.013  ICD-9-CM: 648.23, 285.9  12/26/2019 - Present Yes             Lab Results   Component Value Date/Time    Rubella, External Immune 5.31 07/18/2019    ABO,Rh A negative 07/18/2019      Immunization History   Administered Date(s) Administered    Influenza Vaccine (Quad) PF 10/03/2017       * Discharge Condition: improved and stable    * Procedures:   GI consult  General surgery consult    * Hospital Course:    Pt was admitted to hospital at 31w5d with concern for pancreatitis. She was started on IVF and GI and general surgery were consulted. Initially lipase was >3000 and amylase was 579. She was made NPO and her lipase and amylase improved to  251 and 108 respectively on HD#2 and pain also improved. On HD #2 she was transitioned to GI lite diet which she tolerated well. She had no OB complaints and NST was reactive prior to discharge. She will follow up with primary OB next week and follow up with general surgery postpartum for possible cholecystectomy. GI also instructed patient on dietary changes to avoid this from happening until delivery.       Recent Results (from the past 24 hour(s))   CBC WITH AUTOMATED DIFF    Collection Time: 12/26/19  4:01 PM   Result Value Ref Range    WBC 12.1 (H) 3.6 - 11.0 K/uL    RBC 3.42 (L) 3.80 - 5.20 M/uL    HGB 10.0 (L) 11.5 - 16.0 g/dL    HCT 30.5 (L) 35.0 - 47.0 %    MCV 89.2 80.0 - 99.0 FL    MCH 29.2 26.0 - 34.0 PG    MCHC 32.8 30.0 - 36.5 g/dL    RDW 12.7 11.5 - 14.5 %    PLATELET 249 563 - 712 K/uL    MPV 11.2 8.9 - 12.9 FL    NRBC 0.0 0  WBC    ABSOLUTE NRBC 0.00 0.00 - 0.01 K/uL    NEUTROPHILS 71 32 - 75 %    LYMPHOCYTES 20 12 - 49 %    MONOCYTES 6 5 - 13 %    EOSINOPHILS 1 0 - 7 %    BASOPHILS 1 0 - 1 %    IMMATURE GRANULOCYTES 1 (H) 0.0 - 0.5 %    ABS. NEUTROPHILS 8.8 (H) 1.8 - 8.0 K/UL    ABS. LYMPHOCYTES 2.4 0.8 - 3.5 K/UL    ABS. MONOCYTES 0.7 0.0 - 1.0 K/UL    ABS. EOSINOPHILS 0.1 0.0 - 0.4 K/UL    ABS. BASOPHILS 0.1 0.0 - 0.1 K/UL    ABS. IMM. GRANS. 0.1 (H) 0.00 - 0.04 K/UL    DF AUTOMATED     METABOLIC PANEL, COMPREHENSIVE    Collection Time: 12/26/19  4:01 PM   Result Value Ref Range    Sodium 142 136 - 145 mmol/L    Potassium 3.6 3.5 - 5.1 mmol/L    Chloride 110 (H) 97 - 108 mmol/L    CO2 24 21 - 32 mmol/L    Anion gap 8 5 - 15 mmol/L    Glucose 75 65 - 100 mg/dL    BUN 6 6 - 20 MG/DL    Creatinine 0.54 (L) 0.55 - 1.02 MG/DL    BUN/Creatinine ratio 11 (L) 12 - 20      GFR est AA >60 >60 ml/min/1.73m2    GFR est non-AA >60 >60 ml/min/1.73m2    Calcium 8.5 8.5 - 10.1 MG/DL    Bilirubin, total 0.4 0.2 - 1.0 MG/DL    ALT (SGPT) 34 12 - 78 U/L    AST (SGOT) 46 (H) 15 - 37 U/L    Alk.  phosphatase 56 45 - 117 U/L    Protein, total 6.2 (L) 6.4 - 8.2 g/dL    Albumin 2.8 (L) 3.5 - 5.0 g/dL    Globulin 3.4 2.0 - 4.0 g/dL    A-G Ratio 0.8 (L) 1.1 - 2.2     AMYLASE    Collection Time: 12/26/19  4:01 PM   Result Value Ref Range    Amylase 302 (H) 25 - 115 U/L   LIPASE    Collection Time: 12/26/19  4:01 PM   Result Value Ref Range    Lipase 1,205 (H) 73 - 606 U/L   METABOLIC PANEL, COMPREHENSIVE    Collection Time: 12/27/19  4:26 AM   Result Value Ref Range Sodium 137 136 - 145 mmol/L    Potassium 3.6 3.5 - 5.1 mmol/L    Chloride 108 97 - 108 mmol/L    CO2 24 21 - 32 mmol/L    Anion gap 5 5 - 15 mmol/L    Glucose 83 65 - 100 mg/dL    BUN 5 (L) 6 - 20 MG/DL    Creatinine 0.51 (L) 0.55 - 1.02 MG/DL    BUN/Creatinine ratio 10 (L) 12 - 20      GFR est AA >60 >60 ml/min/1.73m2    GFR est non-AA >60 >60 ml/min/1.73m2    Calcium 8.4 (L) 8.5 - 10.1 MG/DL    Bilirubin, total 0.3 0.2 - 1.0 MG/DL    ALT (SGPT) 25 12 - 78 U/L    AST (SGOT) 27 15 - 37 U/L    Alk. phosphatase 48 45 - 117 U/L    Protein, total 6.0 (L) 6.4 - 8.2 g/dL    Albumin 2.4 (L) 3.5 - 5.0 g/dL    Globulin 3.6 2.0 - 4.0 g/dL    A-G Ratio 0.7 (L) 1.1 - 2.2     CBC WITH AUTOMATED DIFF    Collection Time: 12/27/19  4:26 AM   Result Value Ref Range    WBC 10.1 3.6 - 11.0 K/uL    RBC 3.12 (L) 3.80 - 5.20 M/uL    HGB 9.3 (L) 11.5 - 16.0 g/dL    HCT 27.7 (L) 35.0 - 47.0 %    MCV 88.8 80.0 - 99.0 FL    MCH 29.8 26.0 - 34.0 PG    MCHC 33.6 30.0 - 36.5 g/dL    RDW 12.7 11.5 - 14.5 %    PLATELET 890 247 - 818 K/uL    MPV 11.1 8.9 - 12.9 FL    NRBC 0.0 0  WBC    ABSOLUTE NRBC 0.00 0.00 - 0.01 K/uL    NEUTROPHILS 68 32 - 75 %    LYMPHOCYTES 23 12 - 49 %    MONOCYTES 7 5 - 13 %    EOSINOPHILS 1 0 - 7 %    BASOPHILS 1 0 - 1 %    IMMATURE GRANULOCYTES 0 0.0 - 0.5 %    ABS. NEUTROPHILS 6.9 1.8 - 8.0 K/UL    ABS. LYMPHOCYTES 2.3 0.8 - 3.5 K/UL    ABS. MONOCYTES 0.7 0.0 - 1.0 K/UL    ABS. EOSINOPHILS 0.1 0.0 - 0.4 K/UL    ABS. BASOPHILS 0.1 0.0 - 0.1 K/UL    ABS. IMM.  GRANS. 0.0 0.00 - 0.04 K/UL    DF AUTOMATED     AMYLASE    Collection Time: 12/27/19  4:26 AM   Result Value Ref Range    Amylase 108 25 - 115 U/L   LIPASE    Collection Time: 12/27/19  4:26 AM   Result Value Ref Range    Lipase 251 73 - 393 U/L       * Disposition: Home    Discharge Medications:   Current Discharge Medication List      START taking these medications    Details   simethicone (MYLICON) 80 mg chewable tablet Take 1 Tab by mouth four (4) times daily as needed (GI pain). Qty: 30 Tab, Refills: 1         CONTINUE these medications which have NOT CHANGED    Details   prenatal 14/RMSE fum/folic/dha (PRENATAL-1 PO) Take 1 Tab by mouth daily. ferrous sulfate (SLOW FE PO) Take 1 Tab by mouth. sertraline (ZOLOFT) 25 mg tablet Take 1 Tab by mouth daily. Qty: 90 Tab, Refills: 3    Associated Diagnoses: Anxiety      RABEprazole (ACIPHEX) 20 mg tablet Take 1 Tab by mouth daily as needed. Indications: HEARTBURN  Qty: 20 Tab, Refills: 0    Associated Diagnoses: Gastroesophageal reflux disease without esophagitis         STOP taking these medications       ALPRAZolam (XANAX) 0.25 mg tablet Comments:   Reason for Stopping:               * Follow-up Care/Patient Instructions:   Activity: Activity as tolerated  Diet: GI lite diet      Follow-up Information     Follow up With Specialties Details Why Contact Info    Cameron Dailey MD Internal Medicine   [address] [place] [address]  [phone]      Giselle Sanabria, NP Nurse Practitioner   [facility] [address]      Tiago Antonio MD Gynecology, Obstetrics In 1 week  [address] [place] [address]  [phone]           Fidencio Rodriguez MD   12/27/2019  12:27 PM

## 2019-12-27 NOTE — PROGRESS NOTES
Verbal shift change report given to [id number] (oncoming nurse) by Clint Willingham RN (offgoing nurse). Report included SBAR, Kardex, Intake/Output, MAR, Recent Results and Progress of Care. I accept this patient to my care at this time. Any subsequent documentation in this Progress Note is intended to be information adjunct to other components of the patient's electronic medical record. Refer to accompanying timestamp(s) for chronology. 0030: This is a 21year old female without significant past medical history who presented to the emergency room for evaluation of back pain, chest pain, and shortness of breath. She is currently with a single female intrauterine pregnancy at 31+6/7 weeks; previous RN staff have mentioned the possibility of a marginal cord insertion as part of her obstetric history for this pregnancy, though I am unable to appreciate this from previous care notes. Further evaluation determined likely cholelithiatic and pancreatic etiology of pain evidenced by elevated lipase and amylase levels. She is being followed by gastroenterology here. The current plan of care is admission for pain control, daily non-stress test, and supportive care. Successful pain control here warrants discharge with scheduled laparoscopic cholecystectomy postpartum. Inconsistent pain control would warrant open cholecystectomy here, as her single IUP is a contraindication for laparoscopic intervention at this time.

## 2019-12-27 NOTE — PROGRESS NOTES
General Surgery Daily Progress Note    Admit Date: 2019  Post-Operative Day: * No surgery found * from * No surgery found *     Subjective:     Last 24 hrs: Pt is doing well after eating gi lite breakfast; no abd pain; LFTs, lipase cont to trend down       Objective:     Blood pressure 115/59, pulse 86, temperature 98 °F (36.7 °C), resp. rate 16, height 5' 5\" (1.651 m), weight 192 lb (87.1 kg), SpO2 98 %, not currently breastfeeding. Temp (24hrs), Av.1 °F (36.7 °C), Min:97.8 °F (36.6 °C), Max:98.4 °F (36.9 °C)      _____________________  Physical Exam:     Alert and Oriented, x3, in no acute distress.   Cardiovascular: RRR, no peripheral edema  Abdomen: soft, NT      Assessment:   Principal Problem:    Acute midline thoracic back pain (2019)    Active Problems:    31 weeks gestation of pregnancy (2019)      Anemia affecting pregnancy in third trimester (2019)            Plan:     Cont diet  F/u w/ surgery following delivery of baby in Feb.    Data Review:    Recent Labs     19  04219  1601 19   WBC 10.1 12.1* 12.5*   HGB 9.3* 10.0* 10.4*   HCT 27.7* 30.5* 30.4*    205 216     Recent Labs     19  0426 19  1601 19  0419    142 138   K 3.6 3.6 3.5    110* 107   CO2 24 24 23   GLU 83 75 114*   BUN 5* 6 9   CREA 0.51* 0.54* 0.57   CA 8.4* 8.5 8.8   ALB 2.4* 2.8* 2.9*   SGOT 27 46* 16   ALT 25 34 16     Recent Labs     19  0426 19  1601 19  0419    302* 579*   LPSE 251 1,205* >3,000*           ______________________  Medications:    Current Facility-Administered Medications   Medication Dose Route Frequency    simethicone (MYLICON) tablet 80 mg  80 mg Oral QID PRN    lactated Ringers infusion  175 mL/hr IntraVENous CONTINUOUS       Deshawn Padron NP  2019

## 2019-12-27 NOTE — PROGRESS NOTES
GI PROGRESS NOTE    NAME:             Valorie Perez   :              1995   MRN:              [id number]   Admit Date:     2019  Todays Date:  2019      Subjective:          Currently denies any fever, chills, abdominal pain, nausea or vomiting. Tolerating clear liquids lipase and white blood count have normalized. Review of Systems - Respiratory ROS: no cough, shortness of breath, or wheezing  Cardiovascular ROS: no chest pain or dyspnea on exertion  Medications-reviewed     Current Facility-Administered Medications   Medication Dose Route Frequency    simethicone (MYLICON) tablet 80 mg  80 mg Oral QID PRN    lactated Ringers infusion  175 mL/hr IntraVENous CONTINUOUS        Objective:     Patient Vitals for the past 8 hrs:   BP Temp Pulse Resp SpO2   19 0449 115/59 98 °F (36.7 °C) 86 16 98 %     No intake/output data recorded.  1901 -  0700  In: 1839.6 [P.O.:240; I.V.:1599.6]  Out: -     EXAM:    Visit Vitals  /59 (BP 1 Location: Right arm, BP Patient Position: Lying left side)   Pulse 86   Temp 98 °F (36.7 °C)   Resp 16   Ht 5' 5\" (1.651 m)   Wt 87.1 kg (192 lb)   SpO2 98%   Breastfeeding No   BMI 31.95 kg/m²     General appearance: alert, cooperative, no distress, appears stated age  Lungs: clear to auscultation bilaterally  Heart: regular rate and rhythm, S1, S2 normal, no murmur, click, rub or gallop  Abdomen: soft, non-tender.  Bowel sounds normal. No masses,  no organomegaly, gravid uterus      Data Review     Recent Labs     19  0426 19  1601   WBC 10.1 12.1*   HGB 9.3* 10.0*   HCT 27.7* 30.5*    205     Recent Labs     19  0426 19  1601    142   K 3.6 3.6    110*   CO2 24 24   BUN 5* 6   CREA 0.51* 0.54*   GLU 83 75   CA 8.4* 8.5     Recent Labs     19  0426 19  1601   SGOT 27 46*   AP 48 56   TP 6.0* 6.2*   ALB 2.4* 2.8*   GLOB 3.6 3.4    302*   LPSE 251 1,205* Assessment:   1. Gallstone pancreatitis-much improved         Principal Problem:    Acute midline thoracic back pain (12/26/2019)    Active Problems:    31 weeks gestation of pregnancy (12/26/2019)      Anemia affecting pregnancy in third trimester (12/26/2019)        Plan:   1. GI light diet and if tolerated can be discharged from a GI standpoint. Patient needs to make plans for elective cholecystectomy postpartum advised of the unpredictable nature of gallstone pancreatitis and that she may have a recurrence. Certainly would like to avoid surgery in the third trimester.          Earnest Jones MD

## 2019-12-27 NOTE — PROGRESS NOTES
Problem: Patient Education: Go to Patient Education Activity  Goal: Patient/Family Education  Outcome: Progressing Towards Goal     Problem: Antepartum: Day 1 through Discharge  Goal: Off Pathway (Use only if patient is Off Pathway)  Outcome: Progressing Towards Goal  Goal: Activity/Safety  Outcome: Progressing Towards Goal  Goal: Consults, if ordered  Outcome: Progressing Towards Goal  Goal: Diagnostic Test/Procedures  Outcome: Progressing Towards Goal  Goal: Nutrition/Diet  Outcome: Progressing Towards Goal  Goal: Discharge Planning  Outcome: Progressing Towards Goal  Goal: Medications  Outcome: Progressing Towards Goal  Goal: Treatments/Interventions/Procedures  Outcome: Progressing Towards Goal  Goal: Psychosocial  Outcome: Progressing Towards Goal  Goal: *Vital signs within defined limits  Outcome: Progressing Towards Goal  Goal: *Labs within defined limits  Outcome: Progressing Towards Goal  Goal: *Hemodynamically stable  Outcome: Progressing Towards Goal  Goal: *Optimal pain control at patient's stated goal  Outcome: Progressing Towards Goal  Goal: *Tolerating diet  Outcome: Progressing Towards Goal  Goal: *Performs self perineal care  Outcome: Progressing Towards Goal  Goal: *Reassuring fetal surveillance  Outcome: Progressing Towards Goal  Goal: *Able to cope  Outcome: Progressing Towards Goal  Goal: *Absence of injury  Outcome: Progressing Towards Goal  Goal: *Free from active signs of labor  Outcome: Progressing Towards Goal     Problem: Antepartum: Discharge Outcomes  Goal: *Free from active signs of labor  Outcome: Progressing Towards Goal  Goal: *Absence of injury  Outcome: Progressing Towards Goal  Goal: *Able to cope  Outcome: Progressing Towards Goal  Goal: *Reassuring fetal surveillance  Outcome: Progressing Towards Goal  Goal: *Describes available resources and support systems  Outcome: Progressing Towards Goal  Goal: *No signs and symptoms of infection  Outcome: Progressing Towards Goal  Goal: *Received and verbalizes understanding of discharge plan and instructions  Outcome: Progressing Towards Goal  Goal: *Vital signs within defined limits  Outcome: Progressing Towards Goal  Goal: *Labs within defined limits  Outcome: Progressing Towards Goal  Goal: *Hemodynamically stable  Outcome: Progressing Towards Goal  Goal: *Optimal pain control at patient's stated goal  Outcome: Progressing Towards Goal  Goal: *Demonstrates progressive activity  Outcome: Progressing Towards Goal  Goal: *Tolerating diet  Outcome: Progressing Towards Goal  Goal: *Verbalizes name, dosage, time, side effects, and number of days to continue medications  Outcome: Progressing Towards Goal     Problem: Pain  Goal: *Control of Pain  Outcome: Progressing Towards Goal     Problem: Patient Education: Go to Patient Education Activity  Goal: Patient/Family Education  Outcome: Progressing Towards Goal     Problem: Pain  Goal: *Control of Pain  Outcome: Progressing Towards Goal     Problem: Patient Education: Go to Patient Education Activity  Goal: Patient/Family Education  Outcome: Progressing Towards Goal     Problem: Pressure Injury - Risk of  Goal: *Prevention of pressure injury  Description  Document Don Scale and appropriate interventions in the flowsheet. Outcome: Progressing Towards Goal  Note: Pressure Injury Interventions:             Activity Interventions: Increase time out of bed         Nutrition Interventions: Document food/fluid/supplement intake                     Problem: Patient Education: Go to Patient Education Activity  Goal: Patient/Family Education  Outcome: Progressing Towards Goal     Problem: Discharge Planning  Goal: *Discharge to safe environment  Outcome: Progressing Towards Goal  Goal: *Knowledge of medication management  Outcome: Progressing Towards Goal  Goal: *Knowledge of discharge instructions  Outcome: Progressing Towards Goal     Problem: Patient Education: Go to Patient Education Activity  Goal: Patient/Family Education  Outcome: Progressing Towards Goal     Problem: Falls - Risk of  Goal: *Absence of Falls  Description  Document Matthewmaik Susuandres Fall Risk and appropriate interventions in the flowsheet.   Outcome: Progressing Towards Goal  Note: Fall Risk Interventions:            Medication Interventions: Teach patient to arise slowly                   Problem: Patient Education: Go to Patient Education Activity  Goal: Patient/Family Education  Outcome: Progressing Towards Goal

## 2019-12-27 NOTE — PROGRESS NOTES
[facility] Progress Note    Selena Sierra  31w6d    Patient admitted for abdominal pain, suspected pancreatitis 31w6d Estimated Date of Delivery: 20   She has been tolerating clears. Denies any further abdominal or pelvic pain. Denies ctx, LOF, VB. Reports good FM. Vitals:    Patient Vitals for the past 24 hrs:   BP Temp Pulse Resp SpO2   19 0449 115/59 98 °F (36.7 °C) 86 16 98 %   19 2022 114/65 97.8 °F (36.6 °C) 92 16 100 %   19 1613 115/62 98.2 °F (36.8 °C) 83 16 97 %   19 1444 103/67 98.4 °F (36.9 °C) 82 14 --     Temp (24hrs), Av.1 °F (36.7 °C), Min:97.8 °F (36.6 °C), Max:98.4 °F (36.9 °C)    I&O:   No intake/output data recorded.  1901 -  0700  In: 1839.6 [P.O.:240; I.V.:1599.6]  Out: -   NST:  Pending for this AM  Uterine Activity: pending this AM    Exam:  Patient without distress. Abdomen soft, non-tender               Fundus soft and non tender               Lower extremities edema No                                           Labs:   Recent Results (from the past 24 hour(s))   CBC WITH AUTOMATED DIFF    Collection Time: 19  4:01 PM   Result Value Ref Range    WBC 12.1 (H) 3.6 - 11.0 K/uL    RBC 3.42 (L) 3.80 - 5.20 M/uL    HGB 10.0 (L) 11.5 - 16.0 g/dL    HCT 30.5 (L) 35.0 - 47.0 %    MCV 89.2 80.0 - 99.0 FL    MCH 29.2 26.0 - 34.0 PG    MCHC 32.8 30.0 - 36.5 g/dL    RDW 12.7 11.5 - 14.5 %    PLATELET 243 830 - 784 K/uL    MPV 11.2 8.9 - 12.9 FL    NRBC 0.0 0  WBC    ABSOLUTE NRBC 0.00 0.00 - 0.01 K/uL    NEUTROPHILS 71 32 - 75 %    LYMPHOCYTES 20 12 - 49 %    MONOCYTES 6 5 - 13 %    EOSINOPHILS 1 0 - 7 %    BASOPHILS 1 0 - 1 %    IMMATURE GRANULOCYTES 1 (H) 0.0 - 0.5 %    ABS. NEUTROPHILS 8.8 (H) 1.8 - 8.0 K/UL    ABS. LYMPHOCYTES 2.4 0.8 - 3.5 K/UL    ABS. MONOCYTES 0.7 0.0 - 1.0 K/UL    ABS. EOSINOPHILS 0.1 0.0 - 0.4 K/UL    ABS. BASOPHILS 0.1 0.0 - 0.1 K/UL    ABS. IMM.  GRANS. 0.1 (H) 0.00 - 0.04 K/UL    DF AUTOMATED     METABOLIC PANEL, COMPREHENSIVE    Collection Time: 12/26/19  4:01 PM   Result Value Ref Range    Sodium 142 136 - 145 mmol/L    Potassium 3.6 3.5 - 5.1 mmol/L    Chloride 110 (H) 97 - 108 mmol/L    CO2 24 21 - 32 mmol/L    Anion gap 8 5 - 15 mmol/L    Glucose 75 65 - 100 mg/dL    BUN 6 6 - 20 MG/DL    Creatinine 0.54 (L) 0.55 - 1.02 MG/DL    BUN/Creatinine ratio 11 (L) 12 - 20      GFR est AA >60 >60 ml/min/1.73m2    GFR est non-AA >60 >60 ml/min/1.73m2    Calcium 8.5 8.5 - 10.1 MG/DL    Bilirubin, total 0.4 0.2 - 1.0 MG/DL    ALT (SGPT) 34 12 - 78 U/L    AST (SGOT) 46 (H) 15 - 37 U/L    Alk. phosphatase 56 45 - 117 U/L    Protein, total 6.2 (L) 6.4 - 8.2 g/dL    Albumin 2.8 (L) 3.5 - 5.0 g/dL    Globulin 3.4 2.0 - 4.0 g/dL    A-G Ratio 0.8 (L) 1.1 - 2.2     AMYLASE    Collection Time: 12/26/19  4:01 PM   Result Value Ref Range    Amylase 302 (H) 25 - 115 U/L   LIPASE    Collection Time: 12/26/19  4:01 PM   Result Value Ref Range    Lipase 1,205 (H) 73 - 191 U/L   METABOLIC PANEL, COMPREHENSIVE    Collection Time: 12/27/19  4:26 AM   Result Value Ref Range    Sodium 137 136 - 145 mmol/L    Potassium 3.6 3.5 - 5.1 mmol/L    Chloride 108 97 - 108 mmol/L    CO2 24 21 - 32 mmol/L    Anion gap 5 5 - 15 mmol/L    Glucose 83 65 - 100 mg/dL    BUN 5 (L) 6 - 20 MG/DL    Creatinine 0.51 (L) 0.55 - 1.02 MG/DL    BUN/Creatinine ratio 10 (L) 12 - 20      GFR est AA >60 >60 ml/min/1.73m2    GFR est non-AA >60 >60 ml/min/1.73m2    Calcium 8.4 (L) 8.5 - 10.1 MG/DL    Bilirubin, total 0.3 0.2 - 1.0 MG/DL    ALT (SGPT) 25 12 - 78 U/L    AST (SGOT) 27 15 - 37 U/L    Alk.  phosphatase 48 45 - 117 U/L    Protein, total 6.0 (L) 6.4 - 8.2 g/dL    Albumin 2.4 (L) 3.5 - 5.0 g/dL    Globulin 3.6 2.0 - 4.0 g/dL    A-G Ratio 0.7 (L) 1.1 - 2.2     CBC WITH AUTOMATED DIFF    Collection Time: 12/27/19  4:26 AM   Result Value Ref Range    WBC 10.1 3.6 - 11.0 K/uL    RBC 3.12 (L) 3.80 - 5.20 M/uL    HGB 9.3 (L) 11.5 - 16.0 g/dL    HCT 27.7 (L) 35.0 - 47.0 %    MCV 88.8 80.0 - 99.0 FL    MCH 29.8 26.0 - 34.0 PG    MCHC 33.6 30.0 - 36.5 g/dL    RDW 12.7 11.5 - 14.5 %    PLATELET 369 778 - 797 K/uL    MPV 11.1 8.9 - 12.9 FL    NRBC 0.0 0  WBC    ABSOLUTE NRBC 0.00 0.00 - 0.01 K/uL    NEUTROPHILS 68 32 - 75 %    LYMPHOCYTES 23 12 - 49 %    MONOCYTES 7 5 - 13 %    EOSINOPHILS 1 0 - 7 %    BASOPHILS 1 0 - 1 %    IMMATURE GRANULOCYTES 0 0.0 - 0.5 %    ABS. NEUTROPHILS 6.9 1.8 - 8.0 K/UL    ABS. LYMPHOCYTES 2.3 0.8 - 3.5 K/UL    ABS. MONOCYTES 0.7 0.0 - 1.0 K/UL    ABS. EOSINOPHILS 0.1 0.0 - 0.4 K/UL    ABS. BASOPHILS 0.1 0.0 - 0.1 K/UL    ABS. IMM. GRANS. 0.0 0.00 - 0.04 K/UL    DF AUTOMATED     AMYLASE    Collection Time: 12/27/19  4:26 AM   Result Value Ref Range    Amylase 108 25 - 115 U/L   LIPASE    Collection Time: 12/27/19  4:26 AM   Result Value Ref Range    Lipase 251 73 - 393 U/L     Abdominal US 12/26:  Indication: Acute pancreatitis     Real-time sonographic imaging of the right upper quadrant was performed. The  liver is normal in appearance without evidence of mass lesion or biliary  dilatation. Portal venous flow is hepatopedal. Gallstones are noted. There is no  gallbladder wall thickening or sonographic Colon's sign. Common bile duct is  normal in size. Mild dilatation of the right collecting system is noted. The  pancreas is not well-visualized due to bowel gas. No free fluid.     IMPRESSION  Impression: Cholelithiasis without evidence to suggest acute cholecystitis. Mild  dilatation of the right collecting system may be related to known pregnancy. Poor visualization of the pancreas. Assessment and Plan:   IU @ 31w6d   Patient Active Problem List    Diagnosis Date Noted    31 weeks gestation of pregnancy 12/26/2019    Acute midline thoracic back pain 12/26/2019    Anemia affecting pregnancy in third trimester 12/26/2019    Anxiety 05/07/2017     Mild acute pancreatitis in the 3rd trimester  of pregnancy.   Labs improved this AM. Repeat labs ordered for tomorrow AM.  Appreciate GI recommendations. Currently tolerating clear liquids. Will likely advance diet today and decrease or stop IVF but will defer to GI   Appreciate gen surg consult. Possibly cholecystectomy postpartum   No OB issues at this time.   Daily NST ordered        Lobo Colvin MD   12/27/2019  7:54 AM

## 2019-12-27 NOTE — PROGRESS NOTES
Bedside and Verbal shift change report given to Keily. (oncoming nurse) by Eli Fong. (offgoing nurse). Report included the following information SBAR, Kardex, Intake/Output, MAR and Recent Results.

## 2020-05-11 ENCOUNTER — HOSPITAL ENCOUNTER (EMERGENCY)
Age: 25
Discharge: HOME | End: 2020-05-11
Attending: EMERGENCY MEDICINE | Admitting: EMERGENCY MEDICINE
Payer: OTHER GOVERNMENT

## 2020-05-11 ENCOUNTER — APPOINTMENT (OUTPATIENT)
Dept: ULTRASOUND IMAGING | Age: 25
End: 2020-05-11
Attending: EMERGENCY MEDICINE
Payer: OTHER GOVERNMENT

## 2020-05-11 VITALS
DIASTOLIC BLOOD PRESSURE: 82 MMHG | RESPIRATION RATE: 16 BRPM | OXYGEN SATURATION: 96 % | HEART RATE: 90 BPM | SYSTOLIC BLOOD PRESSURE: 120 MMHG | TEMPERATURE: 98.3 F

## 2020-05-11 DIAGNOSIS — K80.20 GALLSTONES: Primary | ICD-10-CM

## 2020-05-11 DIAGNOSIS — K80.50 BILIARY COLIC: ICD-10-CM

## 2020-05-11 LAB
ALBUMIN SERPL-MCNC: 4.1 G/DL (ref 3.5–5)
ALBUMIN/GLOB SERPL: 1 {RATIO} (ref 1.1–2.2)
ALP SERPL-CCNC: 51 U/L (ref 45–117)
ALT SERPL-CCNC: 15 U/L (ref 12–78)
ANION GAP SERPL CALC-SCNC: 4 MMOL/L (ref 5–15)
APPEARANCE UR: CLEAR
AST SERPL-CCNC: 12 U/L (ref 15–37)
BACTERIA URNS QL MICRO: ABNORMAL /HPF
BASOPHILS # BLD: 0.1 K/UL (ref 0–0.1)
BASOPHILS NFR BLD: 1 % (ref 0–1)
BILIRUB SERPL-MCNC: 0.4 MG/DL (ref 0.2–1)
BILIRUB UR QL: NEGATIVE
BUN SERPL-MCNC: 11 MG/DL (ref 6–20)
BUN/CREAT SERPL: 13 (ref 12–20)
CALCIUM SERPL-MCNC: 9.2 MG/DL (ref 8.5–10.1)
CHLORIDE SERPL-SCNC: 104 MMOL/L (ref 97–108)
CO2 SERPL-SCNC: 27 MMOL/L (ref 21–32)
COLOR UR: ABNORMAL
COMMENT, HOLDF: NORMAL
CREAT SERPL-MCNC: 0.85 MG/DL (ref 0.55–1.02)
DIFFERENTIAL METHOD BLD: NORMAL
EOSINOPHIL # BLD: 0.2 K/UL (ref 0–0.4)
EOSINOPHIL NFR BLD: 3 % (ref 0–7)
EPITH CASTS URNS QL MICRO: ABNORMAL /LPF
ERYTHROCYTE [DISTWIDTH] IN BLOOD BY AUTOMATED COUNT: 13 % (ref 11.5–14.5)
GLOBULIN SER CALC-MCNC: 4 G/DL (ref 2–4)
GLUCOSE SERPL-MCNC: 90 MG/DL (ref 65–100)
GLUCOSE UR STRIP.AUTO-MCNC: NEGATIVE MG/DL
HCT VFR BLD AUTO: 40.8 % (ref 35–47)
HGB BLD-MCNC: 13 G/DL (ref 11.5–16)
HGB UR QL STRIP: NEGATIVE
HYALINE CASTS URNS QL MICRO: ABNORMAL /LPF (ref 0–5)
IMM GRANULOCYTES # BLD AUTO: 0 K/UL (ref 0–0.04)
IMM GRANULOCYTES NFR BLD AUTO: 0 % (ref 0–0.5)
KETONES UR QL STRIP.AUTO: NEGATIVE MG/DL
LEUKOCYTE ESTERASE UR QL STRIP.AUTO: NEGATIVE
LIPASE SERPL-CCNC: 164 U/L (ref 73–393)
LYMPHOCYTES # BLD: 3 K/UL (ref 0.8–3.5)
LYMPHOCYTES NFR BLD: 33 % (ref 12–49)
MCH RBC QN AUTO: 26.8 PG (ref 26–34)
MCHC RBC AUTO-ENTMCNC: 31.9 G/DL (ref 30–36.5)
MCV RBC AUTO: 84.1 FL (ref 80–99)
MONOCYTES # BLD: 0.7 K/UL (ref 0–1)
MONOCYTES NFR BLD: 8 % (ref 5–13)
NEUTS SEG # BLD: 5.1 K/UL (ref 1.8–8)
NEUTS SEG NFR BLD: 55 % (ref 32–75)
NITRITE UR QL STRIP.AUTO: NEGATIVE
NRBC # BLD: 0 K/UL (ref 0–0.01)
NRBC BLD-RTO: 0 PER 100 WBC
PH UR STRIP: 6.5 [PH] (ref 5–8)
PLATELET # BLD AUTO: 282 K/UL (ref 150–400)
PMV BLD AUTO: 10.9 FL (ref 8.9–12.9)
POTASSIUM SERPL-SCNC: 3.7 MMOL/L (ref 3.5–5.1)
PROT SERPL-MCNC: 8.1 G/DL (ref 6.4–8.2)
PROT UR STRIP-MCNC: NEGATIVE MG/DL
RBC # BLD AUTO: 4.85 M/UL (ref 3.8–5.2)
RBC #/AREA URNS HPF: ABNORMAL /HPF (ref 0–5)
SAMPLES BEING HELD,HOLD: NORMAL
SODIUM SERPL-SCNC: 135 MMOL/L (ref 136–145)
SP GR UR REFRACTOMETRY: 1.01 (ref 1–1.03)
UR CULT HOLD, URHOLD: NORMAL
UROBILINOGEN UR QL STRIP.AUTO: 0.2 EU/DL (ref 0.2–1)
WBC # BLD AUTO: 9.1 K/UL (ref 3.6–11)
WBC URNS QL MICRO: ABNORMAL /HPF (ref 0–4)

## 2020-05-11 PROCEDURE — 81001 URINALYSIS AUTO W/SCOPE: CPT

## 2020-05-11 PROCEDURE — 83690 ASSAY OF LIPASE: CPT

## 2020-05-11 PROCEDURE — 99283 EMERGENCY DEPT VISIT LOW MDM: CPT

## 2020-05-11 PROCEDURE — 80053 COMPREHEN METABOLIC PANEL: CPT

## 2020-05-11 PROCEDURE — 76705 ECHO EXAM OF ABDOMEN: CPT

## 2020-05-11 PROCEDURE — 36415 COLL VENOUS BLD VENIPUNCTURE: CPT

## 2020-05-11 PROCEDURE — 85025 COMPLETE CBC W/AUTO DIFF WBC: CPT

## 2020-05-11 RX ORDER — HYDROCODONE BITARTRATE AND ACETAMINOPHEN 5; 325 MG/1; MG/1
1 TABLET ORAL
Qty: 8 TAB | Refills: 0 | Status: SHIPPED | OUTPATIENT
Start: 2020-05-11 | End: 2020-05-13 | Stop reason: SDUPTHER

## 2020-05-11 NOTE — DISCHARGE INSTRUCTIONS
Use bland diet. Norco:1 pill every 6 hours as needed for pain. No alcohol or driving with this medicine. Be aware of sedating effects. Return to ER for any fever, chills, vomiting, worsening or persistent pain, pain despite pain medicine. Follow-up with Dr. Steve Encarnacion as scheduled in 2 days. Learning About Gallstones  What are gallstones? Gallstones are stones that form in the gallbladder. The gallbladder is a small sac located just under the liver. It stores bile released by the liver. Bile helps you digest fats. Gallstones can be smaller than a grain of sand or as large as a golf ball. Gallstones form when cholesterol and other things found in bile make stones. They can also form if the gallbladder doesn't empty as it should. Gallstones can also form in the common bile duct or cystic duct. These tubes carry bile from the gallbladder and the liver to the small intestine. What happens when you have gallstones? Gallstones can cause many different problems, such as:  · A blockage in the common bile duct. · Inflammation or infection of the gallbladder (acute cholecystitis). This can happen when a gallstone blocks the cystic duct. · Inflammation or infection of the common bile duct (cholangitis). This can happen when gallstones get stuck in the common bile duct. In rare cases, this can damage the liver or spread infection. · Pancreatitis, an inflammation of the pancreas. What are the symptoms? · Most people who have gallstones don't have symptoms. · When symptoms occur, they can include:  ? Pain in the pit of your stomach or in the upper right part of your belly. It may spread to your right upper back or shoulder blade area. ? Pain that may come and go or be steady. It may get worse when you eat. ? Fever and chills, if a gallstone is blocking a bile duct and causing an infection. ? Yellowing of your skin and the whites of your eyes. How can you prevent gallstones?   There is no sure way to prevent gallstones. But you can reduce your risk of forming gallstones that can cause symptoms. · Stay at a healthy weight. If you need to lose weight, do so slowly and sensibly. · Eat regular, balanced meals. · Be active, and exercise regularly. How are gallstones treated? · If you don't have symptoms, you probably don't need treatment. · For mild symptoms, your doctor may have you take pain medicine and wait to see if the pain goes away. · For severe pain or infection, or if you have more than one gallstone attack, your doctor may suggest surgery to have your gallbladder removed. The body works fine without a gallbladder. Follow-up care is a key part of your treatment and safety. Be sure to make and go to all appointments, and call your doctor if you are having problems. It's also a good idea to know your test results and keep a list of the medicines you take. Where can you learn more? Go to http://antonella-cynthia.info/  Enter I524 in the search box to learn more about \"Learning About Gallstones. \"  Current as of: August 11, 2019Content Version: 12.4  © 0975-8388 [employer], Incorporated. Care instructions adapted under license by [employer] (which disclaims liability or warranty for this information). If you have questions about a medical condition or this instruction, always ask your healthcare professional. [address] any warranty or liability for your use of this information.

## 2020-05-11 NOTE — ED TRIAGE NOTES
She has hx of gall stones with pancreatitis in December. She was pregnant at the time so did not have surgery. She is 11 weeks postpartum. She started to have upper right quadrant abdominal pain yesterday that has persisted.

## 2020-05-11 NOTE — ED PROVIDER NOTES
19-year-old female history of gallstones and pancreatitis presents the emergency room for evaluation of right upper quadrant pain. Patient was diagnosed with gallstones in December. At that time however she was pregnant so the gallbladder was not removed. Patient has an appointment on Wednesday with general surgery. Patient reports over the past 24 hours she is developed an aching right upper quadrant. No nausea or vomiting. No fevers or chills. Patient is taking ibuprofen and Tylenol with no relief. No chest pain, shortness breath difficulty breathing. No dysuria frequency urgency. No back pain or flank pain. No noted blood in the urine. No aggravating factors. No alleviating factors. No medicines taken prior to arrival. Pain currently 7/10. Pt 11 weeks postpartum    Social hx  Nonsmoker  Occasional alcohol    The history is provided by the patient. No  was used. Abdominal Pain    Associated symptoms include nausea and vomiting. Pertinent negatives include no fever, no diarrhea, no dysuria, no frequency, no headaches, no arthralgias, no myalgias and no chest pain. Past Medical History:   Diagnosis Date    Anemia     Anemia affecting pregnancy in third trimester 12/26/2019    Anxiety 5/7/2017    Back muscle spasm 1/14/2018    Breast lump on left side at 2 o'clock position 5/7/2017    Gall stones     Pancreatitis     Psychiatric problem     Anxiety, no current medications       History reviewed. No pertinent surgical history. No family history on file.     Social History     Socioeconomic History    Marital status:      Spouse name: Not on file    Number of children: Not on file    Years of education: Not on file    Highest education level: Not on file   Occupational History    Not on file   Social Needs    Financial resource strain: Not on file    Food insecurity     Worry: Not on file     Inability: Not on file   R[employer] needs Medical: Not on file     Non-medical: Not on file   Tobacco Use    Smoking status: Never Smoker    Smokeless tobacco: Never Used   Substance and Sexual Activity    Alcohol use: Not Currently     Comment: occasional     Drug use: No    Sexual activity: Yes     Partners: Male   Lifestyle    Physical activity     Days per week: Not on file     Minutes per session: Not on file    Stress: Not on file   Relationships    Social connections     Talks on phone: Not on file     Gets together: Not on file     Attends Mosque service: Not on file     Active member of club or organization: Not on file     Attends meetings of clubs or organizations: Not on file     Relationship status: Not on file    Intimate partner violence     Fear of current or ex partner: Not on file     Emotionally abused: Not on file     Physically abused: Not on file     Forced sexual activity: Not on file   Other Topics Concern     Service Not Asked    Blood Transfusions Not Asked    Caffeine Concern Not Asked    Occupational Exposure Not Asked   Rudolph Feliciano Hazards Not Asked    Sleep Concern Not Asked    Stress Concern Not Asked    Weight Concern Not Asked    Special Diet Not Asked    Back Care Not Asked    Exercise Not Asked    Bike Helmet Not Asked    Seat Belt Not Asked    Self-Exams Not Asked   Social History Narrative    Not on file         ALLERGIES: Patient has no known allergies. Review of Systems   Constitutional: Negative for chills and fever. Respiratory: Negative for cough and shortness of breath. Cardiovascular: Negative for chest pain and palpitations. Gastrointestinal: Positive for abdominal pain, nausea and vomiting. Negative for blood in stool and diarrhea. Genitourinary: Negative for dysuria, flank pain, frequency and urgency. Musculoskeletal: Negative for arthralgias, myalgias, neck pain and neck stiffness. Skin: Negative for rash and wound.    Neurological: Negative for dizziness, numbness and headaches. All other systems reviewed and are negative. Vitals:    05/11/20 1635   BP: 120/82   Pulse: 90   Resp: 16   Temp: 98.3 °F (36.8 °C)   SpO2: 96%            Physical Exam  Vitals signs and nursing note reviewed. Constitutional:       General: She is not in acute distress. Appearance: Normal appearance. She is well-developed. HENT:      Head: Normocephalic and atraumatic. Right Ear: External ear normal.      Left Ear: External ear normal.   Eyes:      Conjunctiva/sclera: Conjunctivae normal.      Pupils: Pupils are equal, round, and reactive to light. Neck:      Musculoskeletal: Normal range of motion and neck supple. Cardiovascular:      Rate and Rhythm: Normal rate and regular rhythm. Heart sounds: Normal heart sounds. Pulmonary:      Effort: Pulmonary effort is normal. No respiratory distress. Breath sounds: Normal breath sounds. No wheezing. Abdominal:      General: Bowel sounds are normal. There is no distension or abdominal bruit. Palpations: Abdomen is soft. Abdomen is not rigid. There is no shifting dullness, hepatomegaly, splenomegaly, mass or pulsatile mass. Tenderness: There is abdominal tenderness in the right upper quadrant. There is no right CVA tenderness, left CVA tenderness, guarding or rebound. Negative signs include Colon's sign and McBurney's sign. Comments: Soft, no peritoneal signs  Mild ruq pain with palpation   No peritoneal signs   Musculoskeletal: Normal range of motion. General: No tenderness. Skin:     General: Skin is warm and dry. Findings: No erythema or rash. Neurological:      General: No focal deficit present. Mental Status: She is alert and oriented to person, place, and time. Cranial Nerves: No cranial nerve deficit. Motor: No weakness. Coordination: Coordination normal.      Deep Tendon Reflexes: Reflexes are normal and symmetric.    Psychiatric:         Mood and Affect: Mood normal.         Behavior: Behavior normal.         Thought Content: Thought content normal.         Judgment: Judgment normal.          MDM  Number of Diagnoses or Management Options  Biliary colic:   Gallstones:   Diagnosis management comments: Assessment and plan: This is a 25-year-old female presenting to the emergency room for right upper quadrant abdominal pain. No peritoneal signs. No tenderness in the right upper quadrant. No acute distress. Lungs are clear. No fever. Plan: Ultrasound labs UA. Patient is declining pain medicine    6:47 PM  Patient is well hydrated, well appearing, and in no respiratory distress. Physical exam is reassuring, and without signs of serious illness. Given the patient's history, clinical course, physical exam, and imaging findings, abdominal pain is unlikely secondary to a surgical etiology. Lipase normal. Lft normal. No elevated WBC. Gallstones but no cholecystitis. Patient will be discharged home with pain control and follow-up with primary care physician in one to two days. Patient and caregivers were instructed on signs and symptoms of reasons to return including fever, worsening pain, vomiting, blood in the stool or any other concerns. Standard narcotic and sedating medication warnings given  Patient's results have been reviewed with them. Patient and/or family have verbally conveyed their understanding and agreement of the patient's signs, symptoms, diagnosis, treatment and prognosis and additionally agree to follow up as recommended or return to the Emergency Room should their condition change prior to follow-up. Discharge instructions have also been provided to the patient with some educational information regarding their diagnosis as well a list of reasons why they would want to return to the ER prior to their follow-up appointment should their condition change.                Amount and/or Complexity of Data Reviewed  Discuss the patient with other providers: yes (ER attending-Blue)    Patient Progress  Patient progress: stable    Pt case including HPI, PE, and all available lab and radiology results has been discussed with attending physician. Opportunity to evaluate patient has been provided to ER attending. Discharge and prescription plan has been agreed upon. Procedures    I was personally available for consultation in the emergency department. I have reviewed the chart and agree with the documentation recorded by the [facility], including the assessment, treatment plan, and disposition.   Ashley Miller MD

## 2020-05-13 ENCOUNTER — APPOINTMENT (OUTPATIENT)
Dept: ULTRASOUND IMAGING | Age: 25
End: 2020-05-13
Attending: STUDENT IN AN ORGANIZED HEALTH CARE EDUCATION/TRAINING PROGRAM
Payer: OTHER GOVERNMENT

## 2020-05-13 ENCOUNTER — HOSPITAL ENCOUNTER (EMERGENCY)
Age: 25
Discharge: HOME | End: 2020-05-14
Attending: STUDENT IN AN ORGANIZED HEALTH CARE EDUCATION/TRAINING PROGRAM
Payer: OTHER GOVERNMENT

## 2020-05-13 ENCOUNTER — OFFICE VISIT (OUTPATIENT)
Dept: SURGERY | Age: 25
End: 2020-05-13

## 2020-05-13 VITALS
HEIGHT: 65 IN | WEIGHT: 174.5 LBS | OXYGEN SATURATION: 98 % | TEMPERATURE: 98.6 F | DIASTOLIC BLOOD PRESSURE: 84 MMHG | BODY MASS INDEX: 29.07 KG/M2 | RESPIRATION RATE: 18 BRPM | HEART RATE: 79 BPM | SYSTOLIC BLOOD PRESSURE: 125 MMHG

## 2020-05-13 DIAGNOSIS — K80.50 BILIARY COLIC: Primary | ICD-10-CM

## 2020-05-13 DIAGNOSIS — K80.50 BILIARY COLIC: ICD-10-CM

## 2020-05-13 DIAGNOSIS — K85.10 GALLSTONE PANCREATITIS: Primary | ICD-10-CM

## 2020-05-13 DIAGNOSIS — K80.20 GALLSTONES: ICD-10-CM

## 2020-05-13 LAB
ALBUMIN SERPL-MCNC: 4.3 G/DL (ref 3.5–5)
ALBUMIN/GLOB SERPL: 1.1 {RATIO} (ref 1.1–2.2)
ALP SERPL-CCNC: 54 U/L (ref 45–117)
ALT SERPL-CCNC: 18 U/L (ref 12–78)
ANION GAP SERPL CALC-SCNC: 6 MMOL/L (ref 5–15)
APPEARANCE UR: CLEAR
AST SERPL-CCNC: 13 U/L (ref 15–37)
BACTERIA URNS QL MICRO: NEGATIVE /HPF
BASOPHILS # BLD: 0.1 K/UL (ref 0–0.1)
BASOPHILS NFR BLD: 1 % (ref 0–1)
BILIRUB SERPL-MCNC: 0.4 MG/DL (ref 0.2–1)
BILIRUB UR QL: NEGATIVE
BUN SERPL-MCNC: 9 MG/DL (ref 6–20)
BUN/CREAT SERPL: 13 (ref 12–20)
CALCIUM SERPL-MCNC: 9.7 MG/DL (ref 8.5–10.1)
CHLORIDE SERPL-SCNC: 105 MMOL/L (ref 97–108)
CO2 SERPL-SCNC: 27 MMOL/L (ref 21–32)
COLOR UR: NORMAL
COMMENT, HOLDF: NORMAL
CREAT SERPL-MCNC: 0.71 MG/DL (ref 0.55–1.02)
DIFFERENTIAL METHOD BLD: ABNORMAL
EOSINOPHIL # BLD: 0.5 K/UL (ref 0–0.4)
EOSINOPHIL NFR BLD: 5 % (ref 0–7)
EPITH CASTS URNS QL MICRO: NORMAL /LPF
ERYTHROCYTE [DISTWIDTH] IN BLOOD BY AUTOMATED COUNT: 12.8 % (ref 11.5–14.5)
GLOBULIN SER CALC-MCNC: 3.8 G/DL (ref 2–4)
GLUCOSE SERPL-MCNC: 86 MG/DL (ref 65–100)
GLUCOSE UR STRIP.AUTO-MCNC: NEGATIVE MG/DL
HCG UR QL: NEGATIVE
HCT VFR BLD AUTO: 41.2 % (ref 35–47)
HGB BLD-MCNC: 13.2 G/DL (ref 11.5–16)
HGB UR QL STRIP: NEGATIVE
HYALINE CASTS URNS QL MICRO: NORMAL /LPF (ref 0–5)
IMM GRANULOCYTES # BLD AUTO: 0 K/UL (ref 0–0.04)
IMM GRANULOCYTES NFR BLD AUTO: 0 % (ref 0–0.5)
KETONES UR QL STRIP.AUTO: NEGATIVE MG/DL
LEUKOCYTE ESTERASE UR QL STRIP.AUTO: NEGATIVE
LIPASE SERPL-CCNC: 110 U/L (ref 73–393)
LYMPHOCYTES # BLD: 3.7 K/UL (ref 0.8–3.5)
LYMPHOCYTES NFR BLD: 35 % (ref 12–49)
MCH RBC QN AUTO: 26.9 PG (ref 26–34)
MCHC RBC AUTO-ENTMCNC: 32 G/DL (ref 30–36.5)
MCV RBC AUTO: 83.9 FL (ref 80–99)
MONOCYTES # BLD: 0.7 K/UL (ref 0–1)
MONOCYTES NFR BLD: 6 % (ref 5–13)
NEUTS SEG # BLD: 5.7 K/UL (ref 1.8–8)
NEUTS SEG NFR BLD: 53 % (ref 32–75)
NITRITE UR QL STRIP.AUTO: NEGATIVE
NRBC # BLD: 0 K/UL (ref 0–0.01)
NRBC BLD-RTO: 0 PER 100 WBC
PH UR STRIP: 5.5 [PH] (ref 5–8)
PLATELET # BLD AUTO: 304 K/UL (ref 150–400)
PMV BLD AUTO: 11 FL (ref 8.9–12.9)
POTASSIUM SERPL-SCNC: 3.4 MMOL/L (ref 3.5–5.1)
PROT SERPL-MCNC: 8.1 G/DL (ref 6.4–8.2)
PROT UR STRIP-MCNC: NEGATIVE MG/DL
RBC # BLD AUTO: 4.91 M/UL (ref 3.8–5.2)
RBC #/AREA URNS HPF: NORMAL /HPF (ref 0–5)
SAMPLES BEING HELD,HOLD: NORMAL
SODIUM SERPL-SCNC: 138 MMOL/L (ref 136–145)
SP GR UR REFRACTOMETRY: 1.02 (ref 1–1.03)
UR CULT HOLD, URHOLD: NORMAL
UROBILINOGEN UR QL STRIP.AUTO: 0.2 EU/DL (ref 0.2–1)
WBC # BLD AUTO: 10.7 K/UL (ref 3.6–11)
WBC URNS QL MICRO: NORMAL /HPF (ref 0–4)

## 2020-05-13 PROCEDURE — 96374 THER/PROPH/DIAG INJ IV PUSH: CPT

## 2020-05-13 PROCEDURE — 96361 HYDRATE IV INFUSION ADD-ON: CPT

## 2020-05-13 PROCEDURE — 36415 COLL VENOUS BLD VENIPUNCTURE: CPT

## 2020-05-13 PROCEDURE — 76705 ECHO EXAM OF ABDOMEN: CPT

## 2020-05-13 PROCEDURE — 80053 COMPREHEN METABOLIC PANEL: CPT

## 2020-05-13 PROCEDURE — 74011250636 HC RX REV CODE- 250/636: Performed by: STUDENT IN AN ORGANIZED HEALTH CARE EDUCATION/TRAINING PROGRAM

## 2020-05-13 PROCEDURE — 85025 COMPLETE CBC W/AUTO DIFF WBC: CPT

## 2020-05-13 PROCEDURE — 81025 URINE PREGNANCY TEST: CPT

## 2020-05-13 PROCEDURE — 99284 EMERGENCY DEPT VISIT MOD MDM: CPT

## 2020-05-13 PROCEDURE — 96375 TX/PRO/DX INJ NEW DRUG ADDON: CPT

## 2020-05-13 PROCEDURE — 83690 ASSAY OF LIPASE: CPT

## 2020-05-13 PROCEDURE — 81001 URINALYSIS AUTO W/SCOPE: CPT

## 2020-05-13 RX ORDER — KETOROLAC TROMETHAMINE 10 MG/1
10 TABLET, FILM COATED ORAL
Qty: 9 TAB | Refills: 0 | Status: SHIPPED | OUTPATIENT
Start: 2020-05-13 | End: 2020-05-14 | Stop reason: SDUPTHER

## 2020-05-13 RX ORDER — ONDANSETRON 4 MG/1
4 TABLET, ORALLY DISINTEGRATING ORAL
Qty: 9 TAB | Refills: 0 | Status: SHIPPED | OUTPATIENT
Start: 2020-05-13 | End: 2020-05-22

## 2020-05-13 RX ORDER — ONDANSETRON 2 MG/ML
4 INJECTION INTRAMUSCULAR; INTRAVENOUS
Status: COMPLETED | OUTPATIENT
Start: 2020-05-13 | End: 2020-05-13

## 2020-05-13 RX ORDER — KETOROLAC TROMETHAMINE 30 MG/ML
30 INJECTION, SOLUTION INTRAMUSCULAR; INTRAVENOUS ONCE
Status: COMPLETED | OUTPATIENT
Start: 2020-05-13 | End: 2020-05-13

## 2020-05-13 RX ORDER — HYDROCODONE BITARTRATE AND ACETAMINOPHEN 5; 325 MG/1; MG/1
1 TABLET ORAL
Qty: 20 TAB | Refills: 0 | Status: ON HOLD | OUTPATIENT
Start: 2020-05-13 | End: 2020-05-16 | Stop reason: SDUPTHER

## 2020-05-13 RX ADMIN — ONDANSETRON 4 MG: 2 INJECTION INTRAMUSCULAR; INTRAVENOUS at 21:55

## 2020-05-13 RX ADMIN — KETOROLAC TROMETHAMINE 30 MG: 30 INJECTION, SOLUTION INTRAMUSCULAR at 22:37

## 2020-05-13 RX ADMIN — SODIUM CHLORIDE 1000 ML: 900 INJECTION, SOLUTION INTRAVENOUS at 21:59

## 2020-05-13 NOTE — LETTER
5/13/20 Patient: Ramirez Luis YOB: 1995 Date of Visit: 5/13/2020 Giselle Gonzalez NP 
[address], [address] [place] [id number] VIA In Basket Dear Giselle Gonzalez NP, Thank you for referring Ms. Selena Sierra to [facility] for evaluation. My notes for this consultation are attached. If you have questions, please do not hesitate to call me. I look forward to following your patient along with you. Sincerely, Adam Smith MD

## 2020-05-13 NOTE — PROGRESS NOTES
1. Have you been to the ER, urgent care clinic since your last visit? Hospitalized since your last visit? Yes When: 5/11/20 Where: [facility] ER Reason for visit: abdominal pain    2. Have you seen or consulted any other health care providers outside of the [address] [place] [address] since your last visit? Include any pap smears or colon screening.  No

## 2020-05-13 NOTE — PROGRESS NOTES
Subjective: Marline Valle  is a 21 y.o. female who presents for evaluation of gallstones. Pt presented to the ED on 05/11/20 c/o of RUQ abdominal pain. Abdominal US at that time showed gallstones present. She was diagnosed with gallstones and pancreatitis in December 2019, but was pregnant at that time and did not want to undergo surgery. Past Medical History:   Diagnosis Date    Anemia     Anemia affecting pregnancy in third trimester 12/26/2019    Anxiety 5/7/2017    Back muscle spasm 1/14/2018    Breast lump on left side at 2 o'clock position 5/7/2017    Gall stones     Gallstone pancreatitis 5/13/2020    Pancreatitis     Psychiatric problem     Anxiety, no current medications       No past surgical history on file. Social History     Tobacco Use    Smoking status: Never Smoker    Smokeless tobacco: Never Used   Substance Use Topics    Alcohol use: Not Currently     Comment: occasional        No family history on file. Current Outpatient Medications on File Prior to Visit   Medication Sig Dispense Refill    sertraline (ZOLOFT) 25 mg tablet Take 1 Tab by mouth daily. 90 Tab 3    simethicone (MYLICON) 80 mg chewable tablet Take 1 Tab by mouth four (4) times daily as needed (GI pain). 30 Tab 1    prenatal 69/SSGD fum/folic/dha (PRENATAL-1 PO) Take 1 Tab by mouth daily.  ferrous sulfate (SLOW FE PO) Take 1 Tab by mouth.  RABEprazole (ACIPHEX) 20 mg tablet Take 1 Tab by mouth daily as needed. Indications: HEARTBURN 20 Tab 0     No current facility-administered medications on file prior to visit. No Known Allergies      Review of Systems:    A comprehensive review of systems was negative except for that written in the History of Present Illness.     Objective:     Visit Vitals  /84 (BP 1 Location: Left arm, BP Patient Position: Sitting)   Pulse 79   Temp 98.6 °F (37 °C) (Oral)   Resp 18   Ht 5' 5\" (1.651 m)   Wt 174 lb 8 oz (79.2 kg)   SpO2 98%   BMI 29.04 kg/m² Physical Exam:  LUNG: clear to auscultation bilaterally  HEART: regular rate and rhythm, S1, S2 normal, no murmur, click, rub or gallop  ABDOMEN: Soft, mildly tender in the RUQ. Labs: No results found for this or any previous visit (from the past 24 hour(s)). Data Review:     Abdominal US on 05/11/20: IMPRESSION: Cholelithiasis. No specific evidence of acute cholecystitis     Assessment and Plan:       ICD-10-CM ICD-9-CM    1. Gallstone pancreatitis K85.10 577.0      574.20    2. Gallstones K80.20 574.20 HYDROcodone-acetaminophen (Norco) 5-325 mg per tablet   3. Biliary colic D21.34 506.95 HYDROcodone-acetaminophen (Norco) 5-325 mg per tablet       Recommend pt have a laparoscopic cholecystectomy with cholangiogram, as an outpatient. Thoroughly explained their diagnosis, discussed the procedure, and discussed what they should expect for recovery. Advised them to take at least 3-4 days off to allow ample time for them to recover. There is no surgical urgency and can be scheduled at pt's convenience. All questions were answered. They agree with this plan and will schedule this accordingly. The patient was counseled at length about the risks of ramo Covid-19 during their perioperative period and any recovery window from their procedure. The patient was made aware that ramo Covid-19  may worsen their prognosis for recovering from their procedure and lend to a higher morbidity and/or mortality risk. All material risks, benefits, and reasonable alternatives including postponing the procedure were discussed. The patient does  wish to proceed with the procedure at this time. This document was scribed by Genesis Carlson as dictated by Dr. Morgan Mendez.      Signed By: Aspen Galvez MD     05/13/20

## 2020-05-14 ENCOUNTER — TELEPHONE (OUTPATIENT)
Dept: SURGERY | Age: 25
End: 2020-05-14

## 2020-05-14 ENCOUNTER — HOSPITAL ENCOUNTER (OUTPATIENT)
Age: 25
Setting detail: OBSERVATION
Discharge: HOME | End: 2020-05-16
Attending: STUDENT IN AN ORGANIZED HEALTH CARE EDUCATION/TRAINING PROGRAM | Admitting: SURGERY
Payer: OTHER GOVERNMENT

## 2020-05-14 VITALS
SYSTOLIC BLOOD PRESSURE: 119 MMHG | TEMPERATURE: 98.1 F | HEART RATE: 77 BPM | HEIGHT: 65 IN | BODY MASS INDEX: 29.02 KG/M2 | DIASTOLIC BLOOD PRESSURE: 80 MMHG | WEIGHT: 174.16 LBS | OXYGEN SATURATION: 97 % | RESPIRATION RATE: 16 BRPM

## 2020-05-14 DIAGNOSIS — G89.18 POST-OP PAIN: ICD-10-CM

## 2020-05-14 DIAGNOSIS — K80.20 GALLSTONES: ICD-10-CM

## 2020-05-14 DIAGNOSIS — K80.20 SYMPTOMATIC CHOLELITHIASIS: Primary | ICD-10-CM

## 2020-05-14 DIAGNOSIS — K80.50 BILIARY COLIC: ICD-10-CM

## 2020-05-14 LAB
ALBUMIN SERPL-MCNC: 4 G/DL (ref 3.5–5)
ALBUMIN/GLOB SERPL: 1 {RATIO} (ref 1.1–2.2)
ALP SERPL-CCNC: 51 U/L (ref 45–117)
ALT SERPL-CCNC: 16 U/L (ref 12–78)
ANION GAP SERPL CALC-SCNC: 6 MMOL/L (ref 5–15)
AST SERPL-CCNC: 12 U/L (ref 15–37)
BASOPHILS # BLD: 0.1 K/UL (ref 0–0.1)
BASOPHILS NFR BLD: 1 % (ref 0–1)
BILIRUB SERPL-MCNC: 0.3 MG/DL (ref 0.2–1)
BUN SERPL-MCNC: 5 MG/DL (ref 6–20)
BUN/CREAT SERPL: 7 (ref 12–20)
CALCIUM SERPL-MCNC: 9.4 MG/DL (ref 8.5–10.1)
CHLORIDE SERPL-SCNC: 105 MMOL/L (ref 97–108)
CO2 SERPL-SCNC: 28 MMOL/L (ref 21–32)
COMMENT, HOLDF: NORMAL
CREAT SERPL-MCNC: 0.71 MG/DL (ref 0.55–1.02)
DIFFERENTIAL METHOD BLD: ABNORMAL
EOSINOPHIL # BLD: 0.3 K/UL (ref 0–0.4)
EOSINOPHIL NFR BLD: 2 % (ref 0–7)
ERYTHROCYTE [DISTWIDTH] IN BLOOD BY AUTOMATED COUNT: 12.9 % (ref 11.5–14.5)
GLOBULIN SER CALC-MCNC: 3.9 G/DL (ref 2–4)
GLUCOSE SERPL-MCNC: 96 MG/DL (ref 65–100)
HCT VFR BLD AUTO: 40.1 % (ref 35–47)
HGB BLD-MCNC: 12.8 G/DL (ref 11.5–16)
IMM GRANULOCYTES # BLD AUTO: 0.1 K/UL (ref 0–0.04)
IMM GRANULOCYTES NFR BLD AUTO: 0 % (ref 0–0.5)
LIPASE SERPL-CCNC: 69 U/L (ref 73–393)
LYMPHOCYTES # BLD: 1.9 K/UL (ref 0.8–3.5)
LYMPHOCYTES NFR BLD: 16 % (ref 12–49)
MCH RBC QN AUTO: 27.2 PG (ref 26–34)
MCHC RBC AUTO-ENTMCNC: 31.9 G/DL (ref 30–36.5)
MCV RBC AUTO: 85.1 FL (ref 80–99)
MONOCYTES # BLD: 0.7 K/UL (ref 0–1)
MONOCYTES NFR BLD: 6 % (ref 5–13)
NEUTS SEG # BLD: 8.8 K/UL (ref 1.8–8)
NEUTS SEG NFR BLD: 75 % (ref 32–75)
NRBC # BLD: 0 K/UL (ref 0–0.01)
NRBC BLD-RTO: 0 PER 100 WBC
PLATELET # BLD AUTO: 277 K/UL (ref 150–400)
PMV BLD AUTO: 11 FL (ref 8.9–12.9)
POTASSIUM SERPL-SCNC: 3.7 MMOL/L (ref 3.5–5.1)
PROT SERPL-MCNC: 7.9 G/DL (ref 6.4–8.2)
RBC # BLD AUTO: 4.71 M/UL (ref 3.8–5.2)
SAMPLES BEING HELD,HOLD: NORMAL
SODIUM SERPL-SCNC: 139 MMOL/L (ref 136–145)
WBC # BLD AUTO: 11.8 K/UL (ref 3.6–11)

## 2020-05-14 PROCEDURE — 96375 TX/PRO/DX INJ NEW DRUG ADDON: CPT

## 2020-05-14 PROCEDURE — 99218 HC RM OBSERVATION: CPT

## 2020-05-14 PROCEDURE — 85025 COMPLETE CBC W/AUTO DIFF WBC: CPT

## 2020-05-14 PROCEDURE — 74011250636 HC RX REV CODE- 250/636: Performed by: SURGERY

## 2020-05-14 PROCEDURE — 36415 COLL VENOUS BLD VENIPUNCTURE: CPT

## 2020-05-14 PROCEDURE — 74011250637 HC RX REV CODE- 250/637: Performed by: SURGERY

## 2020-05-14 PROCEDURE — 99282 EMERGENCY DEPT VISIT SF MDM: CPT

## 2020-05-14 PROCEDURE — 83690 ASSAY OF LIPASE: CPT

## 2020-05-14 PROCEDURE — 80053 COMPREHEN METABOLIC PANEL: CPT

## 2020-05-14 PROCEDURE — 74011000258 HC RX REV CODE- 258: Performed by: SURGERY

## 2020-05-14 PROCEDURE — 96374 THER/PROPH/DIAG INJ IV PUSH: CPT

## 2020-05-14 RX ORDER — HYDROMORPHONE HYDROCHLORIDE 1 MG/ML
.5-1 INJECTION, SOLUTION INTRAMUSCULAR; INTRAVENOUS; SUBCUTANEOUS
Status: DISCONTINUED | OUTPATIENT
Start: 2020-05-14 | End: 2020-05-16 | Stop reason: HOSPADM

## 2020-05-14 RX ORDER — SODIUM CHLORIDE 0.9 % (FLUSH) 0.9 %
5-40 SYRINGE (ML) INJECTION EVERY 8 HOURS
Status: DISCONTINUED | OUTPATIENT
Start: 2020-05-14 | End: 2020-05-16 | Stop reason: HOSPADM

## 2020-05-14 RX ORDER — ACETAMINOPHEN 325 MG/1
650 TABLET ORAL
Status: DISCONTINUED | OUTPATIENT
Start: 2020-05-14 | End: 2020-05-16 | Stop reason: HOSPADM

## 2020-05-14 RX ORDER — SODIUM CHLORIDE 0.9 % (FLUSH) 0.9 %
5-40 SYRINGE (ML) INJECTION AS NEEDED
Status: DISCONTINUED | OUTPATIENT
Start: 2020-05-14 | End: 2020-05-16 | Stop reason: HOSPADM

## 2020-05-14 RX ORDER — DIPHENHYDRAMINE HYDROCHLORIDE 50 MG/ML
12.5 INJECTION, SOLUTION INTRAMUSCULAR; INTRAVENOUS
Status: DISCONTINUED | OUTPATIENT
Start: 2020-05-14 | End: 2020-05-16 | Stop reason: HOSPADM

## 2020-05-14 RX ORDER — NALOXONE HYDROCHLORIDE 0.4 MG/ML
0.4 INJECTION, SOLUTION INTRAMUSCULAR; INTRAVENOUS; SUBCUTANEOUS AS NEEDED
Status: DISCONTINUED | OUTPATIENT
Start: 2020-05-14 | End: 2020-05-16 | Stop reason: HOSPADM

## 2020-05-14 RX ORDER — KETOROLAC TROMETHAMINE 10 MG/1
10 TABLET, FILM COATED ORAL
Qty: 20 TAB | Refills: 0 | Status: SHIPPED | OUTPATIENT
Start: 2020-05-14 | End: 2020-05-17

## 2020-05-14 RX ORDER — SODIUM CHLORIDE, SODIUM LACTATE, POTASSIUM CHLORIDE, CALCIUM CHLORIDE 600; 310; 30; 20 MG/100ML; MG/100ML; MG/100ML; MG/100ML
100 INJECTION, SOLUTION INTRAVENOUS CONTINUOUS
Status: DISCONTINUED | OUTPATIENT
Start: 2020-05-14 | End: 2020-05-16 | Stop reason: HOSPADM

## 2020-05-14 RX ORDER — ONDANSETRON 2 MG/ML
4 INJECTION INTRAMUSCULAR; INTRAVENOUS
Status: DISCONTINUED | OUTPATIENT
Start: 2020-05-14 | End: 2020-05-16 | Stop reason: HOSPADM

## 2020-05-14 RX ADMIN — HYDROMORPHONE HYDROCHLORIDE 0.5 MG: 1 INJECTION, SOLUTION INTRAMUSCULAR; INTRAVENOUS; SUBCUTANEOUS at 22:15

## 2020-05-14 RX ADMIN — SODIUM CHLORIDE, SODIUM LACTATE, POTASSIUM CHLORIDE, AND CALCIUM CHLORIDE 100 ML/HR: 600; 310; 30; 20 INJECTION, SOLUTION INTRAVENOUS at 21:24

## 2020-05-14 RX ADMIN — PIPERACILLIN AND TAZOBACTAM 3.38 G: 3; .375 INJECTION, POWDER, FOR SOLUTION INTRAVENOUS at 21:24

## 2020-05-14 RX ADMIN — ACETAMINOPHEN 650 MG: 325 TABLET ORAL at 22:09

## 2020-05-14 NOTE — ED PROVIDER NOTES
Abdominal Pain    This is a recurrent problem. The current episode started 3 to 5 hours ago. The problem occurs constantly. The problem has not changed since onset. The pain is associated with vomiting. The pain is located in the epigastric region and RUQ. The quality of the pain is sharp and shooting. The pain is at a severity of 9/10. The pain is severe. Associated symptoms include nausea and vomiting. Pertinent negatives include no fever, no diarrhea, no dysuria, no frequency, no hematuria, no trauma, no chest pain and no back pain. The pain is worsened by palpation. The pain is relieved by nothing. Her past medical history is significant for gallstones and pancreatitis. The patient's surgical history non-contributory. Saw Dr. Bishnu Randolph in the office in preparation for outpatient cholecystecomy, referred to ED by Dr. Jaime Rapp., who pt called earlier PTA. Past Medical History:   Diagnosis Date    Anemia     Anemia affecting pregnancy in third trimester 12/26/2019    Anxiety 5/7/2017    Back muscle spasm 1/14/2018    Breast lump on left side at 2 o'clock position 5/7/2017    Gall stones     Gallstone pancreatitis 5/13/2020    Pancreatitis     Psychiatric problem     Anxiety, no current medications       History reviewed. No pertinent surgical history. History reviewed. No pertinent family history.     Social History     Socioeconomic History    Marital status:      Spouse name: Not on file    Number of children: Not on file    Years of education: Not on file    Highest education level: Not on file   Occupational History    Not on file   Social Needs    Financial resource strain: Not on file    Food insecurity     Worry: Not on file     Inability: Not on file    Transportation needs     Medical: Not on file     Non-medical: Not on file   Tobacco Use    Smoking status: Never Smoker    Smokeless tobacco: Never Used   Substance and Sexual Activity    Alcohol use: Not Currently Comment: occasional     Drug use: No    Sexual activity: Yes     Partners: Male   Lifestyle    Physical activity     Days per week: Not on file     Minutes per session: Not on file    Stress: Not on file   Relationships    Social connections     Talks on phone: Not on file     Gets together: Not on file     Attends Islam service: Not on file     Active member of club or organization: Not on file     Attends meetings of clubs or organizations: Not on file     Relationship status: Not on file    Intimate partner violence     Fear of current or ex partner: Not on file     Emotionally abused: Not on file     Physically abused: Not on file     Forced sexual activity: Not on file   Other Topics Concern     Service Not Asked    Blood Transfusions Not Asked    Caffeine Concern Not Asked    Occupational Exposure Not Asked   [place] Hazards Not Asked    Sleep Concern Not Asked    Stress Concern Not Asked    Weight Concern Not Asked    Special Diet Not Asked    Back Care Not Asked    Exercise Not Asked    Bike Helmet Not Asked   [address] Not Asked    Self-Exams Not Asked   Social History Narrative    Not on file         ALLERGIES: Patient has no known allergies. Review of Systems   Constitutional: Negative for fever. Respiratory: Negative for cough and shortness of breath. Cardiovascular: Negative for chest pain. Gastrointestinal: Positive for abdominal pain, nausea and vomiting. Negative for diarrhea. Genitourinary: Negative for dysuria, frequency and hematuria. Musculoskeletal: Negative for back pain. All other systems reviewed and are negative. Vitals:    05/13/20 2101   BP: 120/78   Pulse: (!) 112   Resp: 20   Temp: 98.4 °F (36.9 °C)   SpO2: 98%   Weight: 79 kg (174 lb 2.6 oz)   Height: 5' 5\" (1.651 m)            Physical Exam  Constitutional:       General: She is not in acute distress. Appearance: She is well-developed.    HENT:      Head: Normocephalic and atraumatic. Eyes:      Extraocular Movements: Extraocular movements intact. Conjunctiva/sclera: Conjunctivae normal.   Neck:      Musculoskeletal: Normal range of motion and neck supple. Cardiovascular:      Rate and Rhythm: Normal rate and regular rhythm. Pulmonary:      Effort: Pulmonary effort is normal. No respiratory distress. Abdominal:      Palpations: Abdomen is soft. Tenderness: There is abdominal tenderness (mild) in the right upper quadrant. There is no right CVA tenderness, left CVA tenderness or guarding. Musculoskeletal: Normal range of motion. Skin:     General: Skin is warm and dry. Neurological:      General: No focal deficit present. Mental Status: She is alert and oriented to person, place, and time. Motor: No abnormal muscle tone. MDM       Procedures    11:39 PM  I consulted Dr. Simeon Loving, general surgeon on call. Discussed the case and available results. Recommends discharge home as patient is much improved and now asymptomatic. Patient to follow-up with Dr. Rachelle Solis and arrange outpatient cholecystectomy.

## 2020-05-14 NOTE — TELEPHONE ENCOUNTER
Patient called stating she had to go to ED last night and would like to get her surgery scheduled ASAP due to the pain.

## 2020-05-14 NOTE — ED NOTES
Discharge instructions given to pt by RN. Pt educated on prescribed medications in teach back method and verbalizes understanding. Opportunity for questions provided.  Pt ambulatory out of unit, in no acute distress and taken home by

## 2020-05-14 NOTE — ED TRIAGE NOTES
Arrives with cc of \"gallstone attack\" that started Monday and increased in the past 4 hrs. Was seen by Dr. Bailey Gaxiola today, given an rx for hydrocodone 325mg. took 2 hydrocodone with no relief; last one at 2000. +N. Called on call GI and referred to ED.

## 2020-05-14 NOTE — DISCHARGE INSTRUCTIONS
Patient Education        Learning About Gallstones  What are gallstones? Gallstones are stones that form in the gallbladder. The gallbladder is a small sac located just under the liver. It stores bile released by the liver. Bile helps you digest fats. Gallstones can be smaller than a grain of sand or as large as a golf ball. Gallstones form when cholesterol and other things found in bile make stones. They can also form if the gallbladder doesn't empty as it should. Gallstones can also form in the common bile duct or cystic duct. These tubes carry bile from the gallbladder and the liver to the small intestine. What happens when you have gallstones? Gallstones can cause many different problems, such as:  · A blockage in the common bile duct. · Inflammation or infection of the gallbladder (acute cholecystitis). This can happen when a gallstone blocks the cystic duct. · Inflammation or infection of the common bile duct (cholangitis). This can happen when gallstones get stuck in the common bile duct. In rare cases, this can damage the liver or spread infection. · Pancreatitis, an inflammation of the pancreas. What are the symptoms? · Most people who have gallstones don't have symptoms. · When symptoms occur, they can include:  ? Pain in the pit of your stomach or in the upper right part of your belly. It may spread to your right upper back or shoulder blade area. ? Pain that may come and go or be steady. It may get worse when you eat. ? Fever and chills, if a gallstone is blocking a bile duct and causing an infection. ? Yellowing of your skin and the whites of your eyes. How can you prevent gallstones? There is no sure way to prevent gallstones. But you can reduce your risk of forming gallstones that can cause symptoms. · Stay at a healthy weight. If you need to lose weight, do so slowly and sensibly. · Eat regular, balanced meals. · Be active, and exercise regularly.   How are gallstones treated? · If you don't have symptoms, you probably don't need treatment. · For mild symptoms, your doctor may have you take pain medicine and wait to see if the pain goes away. · For severe pain or infection, or if you have more than one gallstone attack, your doctor may suggest surgery to have your gallbladder removed. The body works fine without a gallbladder. Follow-up care is a key part of your treatment and safety. Be sure to make and go to all appointments, and call your doctor if you are having problems. It's also a good idea to know your test results and keep a list of the medicines you take. Where can you learn more? Go to http://antonella-cynthia.info/  Enter I524 in the search box to learn more about \"Learning About Gallstones. \"  Current as of: August 11, 2019Content Version: 12.4  © 5092-9345 [employer]. Care instructions adapted under license by "[employer]" (which disclaims liability or warranty for this information). If you have questions about a medical condition or this instruction, always ask your healthcare professional. [address] any warranty or liability for your use of this information.

## 2020-05-14 NOTE — ED TRIAGE NOTES
Patient reports right upper quad pain \"gall bladder attack\" starting Monday. Reports being scheduled for jai with Dr. Stephan Barros on Monday, but pain medications at home are no longer working.   Reports calling Dr. Emerson Go and told to come to ED for admission

## 2020-05-15 ENCOUNTER — ANESTHESIA EVENT (OUTPATIENT)
Dept: SURGERY | Age: 25
End: 2020-05-15
Payer: OTHER GOVERNMENT

## 2020-05-15 ENCOUNTER — ANESTHESIA (OUTPATIENT)
Dept: SURGERY | Age: 25
End: 2020-05-15
Payer: OTHER GOVERNMENT

## 2020-05-15 PROCEDURE — 77030018836 HC SOL IRR NACL ICUM -A: Performed by: SURGERY

## 2020-05-15 PROCEDURE — 76060000034 HC ANESTHESIA 1.5 TO 2 HR: Performed by: SURGERY

## 2020-05-15 PROCEDURE — 99218 HC RM OBSERVATION: CPT

## 2020-05-15 PROCEDURE — 74011000250 HC RX REV CODE- 250: Performed by: NURSE ANESTHETIST, CERTIFIED REGISTERED

## 2020-05-15 PROCEDURE — 96376 TX/PRO/DX INJ SAME DRUG ADON: CPT

## 2020-05-15 PROCEDURE — 74011000258 HC RX REV CODE- 258: Performed by: NURSE ANESTHETIST, CERTIFIED REGISTERED

## 2020-05-15 PROCEDURE — 74011000250 HC RX REV CODE- 250: Performed by: SURGERY

## 2020-05-15 PROCEDURE — 76010000149 HC OR TIME 1 TO 1.5 HR: Performed by: SURGERY

## 2020-05-15 PROCEDURE — 74011250637 HC RX REV CODE- 250/637: Performed by: NURSE PRACTITIONER

## 2020-05-15 PROCEDURE — 74011250636 HC RX REV CODE- 250/636: Performed by: SURGERY

## 2020-05-15 PROCEDURE — 77030002895 HC DEV VASC CLOSR COVD -B: Performed by: SURGERY

## 2020-05-15 PROCEDURE — 77030040361 HC SLV COMPR DVT MDII -B: Performed by: SURGERY

## 2020-05-15 PROCEDURE — 77030012770 HC TRCR OPT FX AMR -B: Performed by: SURGERY

## 2020-05-15 PROCEDURE — 74011250637 HC RX REV CODE- 250/637: Performed by: SURGERY

## 2020-05-15 PROCEDURE — 77030007955 HC PCH ENDOSC SPEC J&J -B: Performed by: SURGERY

## 2020-05-15 PROCEDURE — 77030031139 HC SUT VCRL2 J&J -A: Performed by: SURGERY

## 2020-05-15 PROCEDURE — 88304 TISSUE EXAM BY PATHOLOGIST: CPT

## 2020-05-15 PROCEDURE — 77030014008 HC SPNG HEMSTAT J&J -C: Performed by: SURGERY

## 2020-05-15 PROCEDURE — 77030010515 HC APPL ENDOCLP LIG J&J -B: Performed by: SURGERY

## 2020-05-15 PROCEDURE — 74011250636 HC RX REV CODE- 250/636: Performed by: NURSE ANESTHETIST, CERTIFIED REGISTERED

## 2020-05-15 PROCEDURE — 76210000006 HC OR PH I REC 0.5 TO 1 HR: Performed by: SURGERY

## 2020-05-15 PROCEDURE — 77030010507 HC ADH SKN DERMBND J&J -B: Performed by: SURGERY

## 2020-05-15 PROCEDURE — 77030008684 HC TU ET CUF COVD -B: Performed by: ANESTHESIOLOGY

## 2020-05-15 PROCEDURE — 77030026438 HC STYL ET INTUB CARD -A: Performed by: ANESTHESIOLOGY

## 2020-05-15 PROCEDURE — 77030008756 HC TU IRR SUC STRY -B: Performed by: SURGERY

## 2020-05-15 PROCEDURE — 77030008608 HC TRCR ENDOSC SMTH AMR -B: Performed by: SURGERY

## 2020-05-15 PROCEDURE — 77030011640 HC PAD GRND REM COVD -A: Performed by: SURGERY

## 2020-05-15 PROCEDURE — 74011000258 HC RX REV CODE- 258: Performed by: SURGERY

## 2020-05-15 PROCEDURE — 77030005244 HC CATH INSRT PRT RANF -B: Performed by: SURGERY

## 2020-05-15 PROCEDURE — 77030037032 HC INSRT SCIS CLICKLLINE DISP STOR -B: Performed by: SURGERY

## 2020-05-15 PROCEDURE — 77030002933 HC SUT MCRYL J&J -A: Performed by: SURGERY

## 2020-05-15 PROCEDURE — 77030017006 HC DISECTR CRV1 J&J -B: Performed by: SURGERY

## 2020-05-15 PROCEDURE — 74011250636 HC RX REV CODE- 250/636: Performed by: ANESTHESIOLOGY

## 2020-05-15 PROCEDURE — 77030020829: Performed by: SURGERY

## 2020-05-15 RX ORDER — DIPHENHYDRAMINE HYDROCHLORIDE 50 MG/ML
12.5 INJECTION, SOLUTION INTRAMUSCULAR; INTRAVENOUS AS NEEDED
Status: DISCONTINUED | OUTPATIENT
Start: 2020-05-15 | End: 2020-05-15 | Stop reason: HOSPADM

## 2020-05-15 RX ORDER — KETOROLAC TROMETHAMINE 30 MG/ML
INJECTION, SOLUTION INTRAMUSCULAR; INTRAVENOUS
Status: COMPLETED
Start: 2020-05-15 | End: 2020-05-15

## 2020-05-15 RX ORDER — HYDROMORPHONE HYDROCHLORIDE 1 MG/ML
0.2 INJECTION, SOLUTION INTRAMUSCULAR; INTRAVENOUS; SUBCUTANEOUS
Status: DISCONTINUED | OUTPATIENT
Start: 2020-05-15 | End: 2020-05-15 | Stop reason: HOSPADM

## 2020-05-15 RX ORDER — MIDAZOLAM HYDROCHLORIDE 1 MG/ML
0.5 INJECTION, SOLUTION INTRAMUSCULAR; INTRAVENOUS
Status: DISCONTINUED | OUTPATIENT
Start: 2020-05-15 | End: 2020-05-15 | Stop reason: HOSPADM

## 2020-05-15 RX ORDER — SODIUM CHLORIDE, SODIUM LACTATE, POTASSIUM CHLORIDE, CALCIUM CHLORIDE 600; 310; 30; 20 MG/100ML; MG/100ML; MG/100ML; MG/100ML
75 INJECTION, SOLUTION INTRAVENOUS CONTINUOUS
Status: DISCONTINUED | OUTPATIENT
Start: 2020-05-15 | End: 2020-05-15 | Stop reason: HOSPADM

## 2020-05-15 RX ORDER — ACETAMINOPHEN 10 MG/ML
INJECTION, SOLUTION INTRAVENOUS AS NEEDED
Status: DISCONTINUED | OUTPATIENT
Start: 2020-05-15 | End: 2020-05-15 | Stop reason: HOSPADM

## 2020-05-15 RX ORDER — SODIUM CHLORIDE 9 MG/ML
50 INJECTION, SOLUTION INTRAVENOUS CONTINUOUS
Status: DISCONTINUED | OUTPATIENT
Start: 2020-05-15 | End: 2020-05-15 | Stop reason: HOSPADM

## 2020-05-15 RX ORDER — SODIUM CHLORIDE 0.9 % (FLUSH) 0.9 %
5-40 SYRINGE (ML) INJECTION EVERY 8 HOURS
Status: DISCONTINUED | OUTPATIENT
Start: 2020-05-15 | End: 2020-05-15 | Stop reason: HOSPADM

## 2020-05-15 RX ORDER — OXYCODONE AND ACETAMINOPHEN 5; 325 MG/1; MG/1
1 TABLET ORAL
Status: DISCONTINUED | OUTPATIENT
Start: 2020-05-15 | End: 2020-05-16 | Stop reason: HOSPADM

## 2020-05-15 RX ORDER — CEFAZOLIN SODIUM/WATER 2 G/20 ML
2 SYRINGE (ML) INTRAVENOUS ONCE
Status: COMPLETED | OUTPATIENT
Start: 2020-05-15 | End: 2020-05-15

## 2020-05-15 RX ORDER — SODIUM CHLORIDE, SODIUM LACTATE, POTASSIUM CHLORIDE, CALCIUM CHLORIDE 600; 310; 30; 20 MG/100ML; MG/100ML; MG/100ML; MG/100ML
INJECTION, SOLUTION INTRAVENOUS
Status: DISCONTINUED | OUTPATIENT
Start: 2020-05-15 | End: 2020-05-15 | Stop reason: HOSPADM

## 2020-05-15 RX ORDER — GLYCOPYRROLATE 0.2 MG/ML
INJECTION INTRAMUSCULAR; INTRAVENOUS AS NEEDED
Status: DISCONTINUED | OUTPATIENT
Start: 2020-05-15 | End: 2020-05-15 | Stop reason: HOSPADM

## 2020-05-15 RX ORDER — DEXAMETHASONE SODIUM PHOSPHATE 4 MG/ML
INJECTION, SOLUTION INTRA-ARTICULAR; INTRALESIONAL; INTRAMUSCULAR; INTRAVENOUS; SOFT TISSUE AS NEEDED
Status: DISCONTINUED | OUTPATIENT
Start: 2020-05-15 | End: 2020-05-15 | Stop reason: HOSPADM

## 2020-05-15 RX ORDER — BUPIVACAINE HYDROCHLORIDE AND EPINEPHRINE 5; 5 MG/ML; UG/ML
30 INJECTION, SOLUTION EPIDURAL; INTRACAUDAL; PERINEURAL ONCE
Status: COMPLETED | OUTPATIENT
Start: 2020-05-15 | End: 2020-05-15

## 2020-05-15 RX ORDER — NEOSTIGMINE METHYLSULFATE 1 MG/ML
INJECTION, SOLUTION INTRAVENOUS AS NEEDED
Status: DISCONTINUED | OUTPATIENT
Start: 2020-05-15 | End: 2020-05-15 | Stop reason: HOSPADM

## 2020-05-15 RX ORDER — MORPHINE SULFATE 10 MG/ML
2 INJECTION, SOLUTION INTRAMUSCULAR; INTRAVENOUS
Status: DISCONTINUED | OUTPATIENT
Start: 2020-05-15 | End: 2020-05-15 | Stop reason: HOSPADM

## 2020-05-15 RX ORDER — KETOROLAC TROMETHAMINE 30 MG/ML
INJECTION, SOLUTION INTRAMUSCULAR; INTRAVENOUS AS NEEDED
Status: DISCONTINUED | OUTPATIENT
Start: 2020-05-15 | End: 2020-05-15 | Stop reason: HOSPADM

## 2020-05-15 RX ORDER — MIDAZOLAM HYDROCHLORIDE 1 MG/ML
1 INJECTION, SOLUTION INTRAMUSCULAR; INTRAVENOUS AS NEEDED
Status: DISCONTINUED | OUTPATIENT
Start: 2020-05-15 | End: 2020-05-15 | Stop reason: HOSPADM

## 2020-05-15 RX ORDER — LIDOCAINE HYDROCHLORIDE 10 MG/ML
0.1 INJECTION, SOLUTION EPIDURAL; INFILTRATION; INTRACAUDAL; PERINEURAL AS NEEDED
Status: DISCONTINUED | OUTPATIENT
Start: 2020-05-15 | End: 2020-05-15 | Stop reason: HOSPADM

## 2020-05-15 RX ORDER — FENTANYL CITRATE 50 UG/ML
50 INJECTION, SOLUTION INTRAMUSCULAR; INTRAVENOUS AS NEEDED
Status: DISCONTINUED | OUTPATIENT
Start: 2020-05-15 | End: 2020-05-15 | Stop reason: HOSPADM

## 2020-05-15 RX ORDER — OXYCODONE AND ACETAMINOPHEN 5; 325 MG/1; MG/1
1 TABLET ORAL AS NEEDED
Status: DISCONTINUED | OUTPATIENT
Start: 2020-05-15 | End: 2020-05-15 | Stop reason: HOSPADM

## 2020-05-15 RX ORDER — FENTANYL CITRATE 50 UG/ML
25 INJECTION, SOLUTION INTRAMUSCULAR; INTRAVENOUS
Status: DISCONTINUED | OUTPATIENT
Start: 2020-05-15 | End: 2020-05-15 | Stop reason: HOSPADM

## 2020-05-15 RX ORDER — PROPOFOL 10 MG/ML
INJECTION, EMULSION INTRAVENOUS AS NEEDED
Status: DISCONTINUED | OUTPATIENT
Start: 2020-05-15 | End: 2020-05-15 | Stop reason: HOSPADM

## 2020-05-15 RX ORDER — FENTANYL CITRATE 50 UG/ML
INJECTION, SOLUTION INTRAMUSCULAR; INTRAVENOUS AS NEEDED
Status: DISCONTINUED | OUTPATIENT
Start: 2020-05-15 | End: 2020-05-15 | Stop reason: HOSPADM

## 2020-05-15 RX ORDER — ONDANSETRON 2 MG/ML
4 INJECTION INTRAMUSCULAR; INTRAVENOUS AS NEEDED
Status: DISCONTINUED | OUTPATIENT
Start: 2020-05-15 | End: 2020-05-15 | Stop reason: HOSPADM

## 2020-05-15 RX ORDER — LIDOCAINE HYDROCHLORIDE 20 MG/ML
INJECTION, SOLUTION EPIDURAL; INFILTRATION; INTRACAUDAL; PERINEURAL AS NEEDED
Status: DISCONTINUED | OUTPATIENT
Start: 2020-05-15 | End: 2020-05-15 | Stop reason: HOSPADM

## 2020-05-15 RX ORDER — HYDROMORPHONE HYDROCHLORIDE 2 MG/ML
INJECTION, SOLUTION INTRAMUSCULAR; INTRAVENOUS; SUBCUTANEOUS AS NEEDED
Status: DISCONTINUED | OUTPATIENT
Start: 2020-05-15 | End: 2020-05-15 | Stop reason: HOSPADM

## 2020-05-15 RX ORDER — SODIUM CHLORIDE 0.9 % (FLUSH) 0.9 %
5-40 SYRINGE (ML) INJECTION AS NEEDED
Status: DISCONTINUED | OUTPATIENT
Start: 2020-05-15 | End: 2020-05-15 | Stop reason: HOSPADM

## 2020-05-15 RX ORDER — ONDANSETRON 2 MG/ML
INJECTION INTRAMUSCULAR; INTRAVENOUS AS NEEDED
Status: DISCONTINUED | OUTPATIENT
Start: 2020-05-15 | End: 2020-05-15 | Stop reason: HOSPADM

## 2020-05-15 RX ORDER — MIDAZOLAM HYDROCHLORIDE 1 MG/ML
INJECTION, SOLUTION INTRAMUSCULAR; INTRAVENOUS AS NEEDED
Status: DISCONTINUED | OUTPATIENT
Start: 2020-05-15 | End: 2020-05-15 | Stop reason: HOSPADM

## 2020-05-15 RX ORDER — SUCCINYLCHOLINE CHLORIDE 20 MG/ML
INJECTION INTRAMUSCULAR; INTRAVENOUS AS NEEDED
Status: DISCONTINUED | OUTPATIENT
Start: 2020-05-15 | End: 2020-05-15 | Stop reason: HOSPADM

## 2020-05-15 RX ORDER — ROCURONIUM BROMIDE 10 MG/ML
INJECTION, SOLUTION INTRAVENOUS AS NEEDED
Status: DISCONTINUED | OUTPATIENT
Start: 2020-05-15 | End: 2020-05-15 | Stop reason: HOSPADM

## 2020-05-15 RX ADMIN — HYDROMORPHONE HYDROCHLORIDE 0.5 MG: 2 INJECTION, SOLUTION INTRAMUSCULAR; INTRAVENOUS; SUBCUTANEOUS at 13:20

## 2020-05-15 RX ADMIN — DEXMEDETOMIDINE HYDROCHLORIDE 4 MCG: 100 INJECTION, SOLUTION, CONCENTRATE INTRAVENOUS at 12:23

## 2020-05-15 RX ADMIN — HYDROMORPHONE HYDROCHLORIDE 1 MG: 1 INJECTION, SOLUTION INTRAMUSCULAR; INTRAVENOUS; SUBCUTANEOUS at 23:33

## 2020-05-15 RX ADMIN — GLYCOPYRROLATE 0.4 MG: 0.2 INJECTION, SOLUTION INTRAMUSCULAR; INTRAVENOUS at 13:04

## 2020-05-15 RX ADMIN — OXYCODONE HYDROCHLORIDE AND ACETAMINOPHEN 1 TABLET: 5; 325 TABLET ORAL at 21:22

## 2020-05-15 RX ADMIN — DEXAMETHASONE SODIUM PHOSPHATE 8 MG: 4 INJECTION, SOLUTION INTRAMUSCULAR; INTRAVENOUS at 12:22

## 2020-05-15 RX ADMIN — LIDOCAINE HYDROCHLORIDE 40 MG: 20 INJECTION, SOLUTION EPIDURAL; INFILTRATION; INTRACAUDAL; PERINEURAL at 12:08

## 2020-05-15 RX ADMIN — FENTANYL CITRATE 50 MCG: 50 INJECTION, SOLUTION INTRAMUSCULAR; INTRAVENOUS at 12:19

## 2020-05-15 RX ADMIN — SODIUM CHLORIDE, POTASSIUM CHLORIDE, SODIUM LACTATE AND CALCIUM CHLORIDE: 600; 310; 30; 20 INJECTION, SOLUTION INTRAVENOUS at 12:00

## 2020-05-15 RX ADMIN — DEXMEDETOMIDINE HYDROCHLORIDE 4 MCG: 100 INJECTION, SOLUTION, CONCENTRATE INTRAVENOUS at 12:24

## 2020-05-15 RX ADMIN — ACETAMINOPHEN 1000 MG: 10 INJECTION, SOLUTION INTRAVENOUS at 12:46

## 2020-05-15 RX ADMIN — MIDAZOLAM 2 MG: 1 INJECTION INTRAMUSCULAR; INTRAVENOUS at 11:55

## 2020-05-15 RX ADMIN — HYDROMORPHONE HYDROCHLORIDE 0.5 MG: 2 INJECTION, SOLUTION INTRAMUSCULAR; INTRAVENOUS; SUBCUTANEOUS at 13:32

## 2020-05-15 RX ADMIN — Medication 2 G: at 12:14

## 2020-05-15 RX ADMIN — HYDROMORPHONE HYDROCHLORIDE 0.5 MG: 2 INJECTION, SOLUTION INTRAMUSCULAR; INTRAVENOUS; SUBCUTANEOUS at 12:30

## 2020-05-15 RX ADMIN — KETOROLAC TROMETHAMINE 30 MG: 30 INJECTION, SOLUTION INTRAMUSCULAR; INTRAVENOUS at 13:32

## 2020-05-15 RX ADMIN — PIPERACILLIN AND TAZOBACTAM 3.38 G: 3; .375 INJECTION, POWDER, FOR SOLUTION INTRAVENOUS at 17:57

## 2020-05-15 RX ADMIN — ROCURONIUM BROMIDE 5 MG: 10 SOLUTION INTRAVENOUS at 12:08

## 2020-05-15 RX ADMIN — ACETAMINOPHEN 650 MG: 325 TABLET ORAL at 03:43

## 2020-05-15 RX ADMIN — PROPOFOL 200 MG: 10 INJECTION, EMULSION INTRAVENOUS at 12:08

## 2020-05-15 RX ADMIN — DEXMEDETOMIDINE HYDROCHLORIDE 4 MCG: 100 INJECTION, SOLUTION, CONCENTRATE INTRAVENOUS at 12:22

## 2020-05-15 RX ADMIN — SUCCINYLCHOLINE CHLORIDE 200 MG: 20 INJECTION, SOLUTION INTRAMUSCULAR; INTRAVENOUS at 12:08

## 2020-05-15 RX ADMIN — PIPERACILLIN AND TAZOBACTAM 3.38 G: 3; .375 INJECTION, POWDER, FOR SOLUTION INTRAVENOUS at 04:10

## 2020-05-15 RX ADMIN — Medication 3 MG: at 13:04

## 2020-05-15 RX ADMIN — OXYCODONE HYDROCHLORIDE AND ACETAMINOPHEN 1 TABLET: 5; 325 TABLET ORAL at 16:44

## 2020-05-15 RX ADMIN — FENTANYL CITRATE 50 MCG: 50 INJECTION, SOLUTION INTRAMUSCULAR; INTRAVENOUS at 11:55

## 2020-05-15 RX ADMIN — ONDANSETRON HYDROCHLORIDE 4 MG: 2 INJECTION, SOLUTION INTRAMUSCULAR; INTRAVENOUS at 12:22

## 2020-05-15 RX ADMIN — PROPOFOL 100 MG: 10 INJECTION, EMULSION INTRAVENOUS at 12:23

## 2020-05-15 RX ADMIN — SODIUM CHLORIDE, SODIUM LACTATE, POTASSIUM CHLORIDE, AND CALCIUM CHLORIDE 75 ML/HR: 600; 310; 30; 20 INJECTION, SOLUTION INTRAVENOUS at 10:55

## 2020-05-15 NOTE — ED PROVIDER NOTES
79-year-old female history of gallstones, gallstone pancreatitis presenting to the ED for abdominal pain, third visit in 4 days. Was seen last night had ultrasound showing cholelithiasis without acute cholecystitis, surgery was consulted, patient was to be managed as an outpatient. Had worsening pain today which prompted her to return to the ER for admission. No fever or vomiting. Pain is located in the right upper quadrant, dull, aching, moderately severe, worsened with oral intake. No other concerns. Past medical history: As above           Past Medical History:   Diagnosis Date    Anemia     Anemia affecting pregnancy in third trimester 12/26/2019    Anxiety 5/7/2017    Back muscle spasm 1/14/2018    Breast lump on left side at 2 o'clock position 5/7/2017    Gall stones     Gallstone pancreatitis 5/13/2020    Pancreatitis     Psychiatric problem     Anxiety, no current medications       History reviewed. No pertinent surgical history. History reviewed. No pertinent family history.     Social History     Socioeconomic History    Marital status:      Spouse name: Not on file    Number of children: Not on file    Years of education: Not on file    Highest education level: Not on file   Occupational History    Not on file   Social Needs    Financial resource strain: Not on file    Food insecurity     Worry: Not on file     Inability: Not on file    Transportation needs     Medical: Not on file     Non-medical: Not on file   Tobacco Use    Smoking status: Never Smoker    Smokeless tobacco: Never Used   Substance and Sexual Activity    Alcohol use: Not Currently     Comment: occasional     Drug use: No    Sexual activity: Yes     Partners: Male   Lifestyle    Physical activity     Days per week: Not on file     Minutes per session: Not on file    Stress: Not on file   Relationships    Social connections     Talks on phone: Not on file     Gets together: Not on file Attends Holiness service: Not on file     Active member of club or organization: Not on file     Attends meetings of clubs or organizations: Not on file     Relationship status: Not on file    Intimate partner violence     Fear of current or ex partner: Not on file     Emotionally abused: Not on file     Physically abused: Not on file     Forced sexual activity: Not on file   Other Topics Concern     Service Not Asked    Blood Transfusions Not Asked    Caffeine Concern Not Asked    Occupational Exposure Not Asked   [place] Hazards Not Asked    Sleep Concern Not Asked    Stress Concern Not Asked    Weight Concern Not Asked    Special Diet Not Asked    Back Care Not Asked    Exercise Not Asked    Bike Helmet Not Asked   [address] Not Asked    Self-Exams Not Asked   Social History Narrative    Not on file         ALLERGIES: Patient has no known allergies. Review of Systems   Constitutional: Negative for fever. HENT: Negative for congestion. Eyes: Negative for discharge and redness. Respiratory: Negative for cough and shortness of breath. Cardiovascular: Negative for chest pain. Gastrointestinal: Positive for abdominal pain. Negative for diarrhea and vomiting. Genitourinary: Negative for difficulty urinating. Musculoskeletal: Negative for joint swelling. Skin: Negative for wound. Neurological: Negative for syncope and headaches. Psychiatric/Behavioral: Negative for behavioral problems. All other systems reviewed and are negative. Vitals:    05/14/20 1927   BP: (!) 129/91   Pulse: 93   Resp: 18   Temp: 98.7 °F (37.1 °C)   SpO2: 98%   Weight: 79 kg (174 lb 2.6 oz)   Height: 5' 5\" (1.651 m)            Physical Exam  Vitals signs and nursing note reviewed. Constitutional:       General: She is not in acute distress. Appearance: She is well-developed. Comments: Well-appearing white female   HENT:      Head: Normocephalic and atraumatic.       Right Ear: External ear normal.      Left Ear: External ear normal.   Eyes:      General: No scleral icterus. Conjunctiva/sclera: Conjunctivae normal.   Neck:      Musculoskeletal: Neck supple. Trachea: No tracheal deviation. Cardiovascular:      Rate and Rhythm: Normal rate and regular rhythm. Heart sounds: Normal heart sounds. No murmur. No friction rub. No gallop. Pulmonary:      Effort: Pulmonary effort is normal. No respiratory distress. Breath sounds: Normal breath sounds. No stridor. No wheezing. Abdominal:      General: There is no distension. Palpations: Abdomen is soft. Tenderness: There is no abdominal tenderness. Musculoskeletal: Normal range of motion. Skin:     General: Skin is warm and dry. Neurological:      Mental Status: She is alert and oriented to person, place, and time. Psychiatric:         Behavior: Behavior normal.          MDM  Number of Diagnoses or Management Options  Diagnosis management comments: 44-year-old female presenting to the ER for continued pain related to known gallstones. Given that this is the patient's third visit in 4 days, unable to tolerate oral intake at home, patient admitted to general surgery. Amount and/or Complexity of Data Reviewed  Clinical lab tests: ordered and reviewed  Tests in the radiology section of CPT®: reviewed  Discuss the patient with other providers: yes (Dr. Cristian Ledesma ED attending.   Dr. Che Avilez, general surgery)           Procedures

## 2020-05-15 NOTE — OP NOTES
[address]   OPERATIVE REPORT    Name:  Hung Ramirez  MR#:  [id number]  :  1995  ACCOUNT #:  [de-identified]  DATE OF SERVICE:  05/15/2020    PREOPERATIVE DIAGNOSIS:  Acute cholecystitis. POSTOPERATIVE DIAGNOSIS:  Acute cholecystitis with hydrops of the gallbladder. PROCEDURE PERFORMED:  Laparoscopic cholecystectomy. SURGEON:  Kareem Costello MD    ASSISTANT:  Ky Rinaldi SA    ANESTHESIA:  General supplemented with 0.5% Marcaine with epinephrine. COMPLICATIONS:  None. SPECIMENS REMOVED:  Gallbladder. IMPLANTS:  None. ESTIMATED BLOOD LOSS:  Minimal.    DRAINS:  None. FINDINGS:  A very severely inflamed gallbladder requiring aspiration of white pus-like bile to be able to actually grasp it. CONDITION:  Good to the PACU. DESCRIPTION OF PROCEDURE:  With the patient supine and suitably anesthetized, the abdomen was prepared with ChloraPrep and draped as a field. The right upper quadrant was entered using VisiPort technique with a 5 mm trocar, and the pneumoperitoneum was established. A 5 mm trocar was placed further laterally in the right upper quadrant and an umbilical trocar was placed as well. A 12 mm trocar was placed in the midline superiorly. There was a lot of inflammation around the gallbladder and the surrounding peritoneum. The gallbladder was markedly distended and thick walled, and I aspirated about 30 mL of whitish creamy material from it and was then able to grasp the gallbladder. All the adhesions to it were mostly filmy adhesions, and these were all taken down. The gallbladder wall thickening was about 7 or 8 mm thick, and I eventually dissected out the cystic duct and the cystic artery. The cystic duct was doubly clipped distally, singly clipped proximally and divided. The cystic artery was doubly clipped proximally, singly clipped distally and divided as well.   By a combination of blunt and cautery dissection, the gallbladder was  from the liver. Anytime there appeared to be anything of substance between the gallbladder and liver, it was clipped. The gallbladder was placed into a retrieval bag and brought out through the superior trocar site which needed to be enlarged to about almost 2.5-3 cm to get the very thickened gallbladder wall out. Once the gallbladder was removed, that trocar was replaced, the right upper quadrant was irrigated until clear. The liver bed was dry. Right upper quadrant was irrigated until clear. The patient was returned to the flat position. Again, the right lower quadrant was irrigated until clear. The 12 mm trocar was removed, and the defect there was closed with a single 0 Vicryl suture passed with an Endo Close device. The two lateral trocars were removed and then the insufflated carbon dioxide gas  was then aspirated through the aspirating system. The trocars were removed. All sites were re-infiltrated with Marcaine and then skin was closed with subcuticular Monocryl followed by Dermabond. At the termination of the procedure, all counts were correct. The patient tolerated this well and was brought to the PACU in satisfactory condition. Prakash Gallagher MD      [id number]  D:  05/15/2020 13:27  T:  05/15/2020 16:19  JOB #:  [id number]  CC:   Giselle Sanabria NP

## 2020-05-15 NOTE — ROUTINE PROCESS
Patient: Jm Bustillos MRN: [id number]  SSN: [id number] YOB: 1995  Age: 21 y.o. Sex: female Patient is status post Procedure(s): CHOLECYSTECTOMY LAPAROSCOPIC. Surgeon(s) and Role: Jennifer Joesph MD - Primary Local/Dose/Irrigation:  See [facility] Peripheral IV 05/14/20 Left Antecubital (Active) Site Assessment Clean, dry, & intact 5/15/2020  7:45 AM  
Phlebitis Assessment 0 5/15/2020  7:45 AM  
Infiltration Assessment 0 5/15/2020  7:45 AM  
Dressing Status Clean, dry, & intact 5/15/2020  7:45 AM  
Dressing Type Tape;Transparent 5/15/2020  7:45 AM  
Hub Color/Line Status Infusing 5/15/2020  7:45 AM  
Action Taken Open ports on tubing capped 5/15/2020  7:45 AM  
Alcohol Cap Used Yes 5/15/2020  7:45 AM  
   
Peripheral IV 05/15/20 Left Hand (Active) Airway - Endotracheal Tube 05/15/20 (Active) Dressing/Packing:  Wound Abdomen 4 trocar incision sites-Dressing Type: Topical skin adhesive/glue(Dermabond) (05/15/20 5592) Splint/Cast:  ] Other:  N/A

## 2020-05-15 NOTE — H&P
Surgery History and Physical    Subjective: Marline Valle is a 21 y.o. female who originally was diagnosed with gallstone pancreatitis in December 2019 at that time she was pregnant and it was decided that she should not undergo surgery. She then developed abdominal pain on May 11 and came to the emergency department. She was diagnosed with symptomatic cholelithiasis. She was sent home and was seen in the office by Dr. Fara Wilkinson for surgery. Later that day she came back to the emergency department at that time she was given Toradol and felt better and was sent home. Today she began having abdominal pain again with nausea but no vomiting. The pain is typical for her gallbladder attacks. Patient Active Problem List    Diagnosis Date Noted    Gallstones 05/14/2020    Gallstone pancreatitis 05/13/2020    31 weeks gestation of pregnancy 12/26/2019    Acute midline thoracic back pain 12/26/2019    Anemia affecting pregnancy in third trimester 12/26/2019    Anxiety 05/07/2017     Past Medical History:   Diagnosis Date    Anemia     Anemia affecting pregnancy in third trimester 12/26/2019    Anxiety 5/7/2017    Back muscle spasm 1/14/2018    Breast lump on left side at 2 o'clock position 5/7/2017    Gall stones     Gallstone pancreatitis 5/13/2020    Pancreatitis     Psychiatric problem     Anxiety, no current medications      History reviewed. No pertinent surgical history. Social History     Tobacco Use    Smoking status: Never Smoker    Smokeless tobacco: Never Used   Substance Use Topics    Alcohol use: Not Currently     Comment: occasional       History reviewed. No pertinent family history. Prior to Admission medications    Medication Sig Start Date End Date Taking? Authorizing Provider   ketorolac (TORADOL) 10 mg tablet Take 1 Tab by mouth every six (6) hours as needed for Pain for up to 3 days.  Indications: excessive pain 5/14/20 5/17/20  Jaun Dela Cruz MD HYDROcodone-acetaminophen (Norco) 5-325 mg per tablet Take 1 Tab by mouth every six (6) hours as needed for Pain for up to 5 days. Max Daily Amount: 4 Tabs. Indications: pain 5/13/20 5/18/20  Jessie Banegas MD   ondansetron (Zofran ODT) 4 mg disintegrating tablet Take 1 Tab by mouth every eight (8) hours as needed for Nausea. 5/13/20   Dede Price MD   simethicone (MYLICON) 80 mg chewable tablet Take 1 Tab by mouth four (4) times daily as needed (GI pain). 12/27/19   Sung Reddy MD   prenatal 55/GSCJ fum/folic/dha (PRENATAL-1 PO) Take 1 Tab by mouth daily. Provider, Historical   ferrous sulfate (SLOW FE PO) Take 1 Tab by mouth. Provider, Historical   sertraline (ZOLOFT) 25 mg tablet Take 1 Tab by mouth daily. 1/8/19   Steffany Maloney MD   RABEprazole (ACIPHEX) 20 mg tablet Take 1 Tab by mouth daily as needed. Indications: HEARTBURN 6/30/17   Steffany Maloney MD     No Known Allergies     Review of Systems:    Pertinent items are noted in the History of Present Illness.      Objective:     Visit Vitals  BP (!) 129/91 (BP 1 Location: Left arm, BP Patient Position: Sitting)   Pulse 93   Temp 98.7 °F (37.1 °C)   Resp 18   Ht 5' 5\" (1.651 m)   Wt 174 lb 2.6 oz (79 kg)   SpO2 98%   BMI 28.98 kg/m²       Physical Exam:    GENERAL: alert, cooperative, no distress, appears stated age, EYE: negative, THROAT & NECK: normal, LUNG: clear to auscultation bilaterally, HEART: regular rate and rhythm, ABDOMEN: Soft with tenderness in the right upper quadrant, EXTREMITIES:  no edema, SKIN: rash noted on neck and upper chest not itching, NEUROLOGIC: negative, PSYCH: non focal    Imaging:  images and reports reviewed    Lab Review:    Recent Results (from the past 24 hour(s))   SAMPLES BEING HELD    Collection Time: 05/13/20  9:08 PM   Result Value Ref Range    SAMPLES BEING HELD 1 RED, 1 BLUE      COMMENT        Add-on orders for these samples will be processed based on acceptable specimen integrity and analyte stability, which may vary by analyte. CBC WITH AUTOMATED DIFF    Collection Time: 05/13/20  9:08 PM   Result Value Ref Range    WBC 10.7 3.6 - 11.0 K/uL    RBC 4.91 3.80 - 5.20 M/uL    HGB 13.2 11.5 - 16.0 g/dL    HCT 41.2 35.0 - 47.0 %    MCV 83.9 80.0 - 99.0 FL    MCH 26.9 26.0 - 34.0 PG    MCHC 32.0 30.0 - 36.5 g/dL    RDW 12.8 11.5 - 14.5 %    PLATELET 787 600 - 335 K/uL    MPV 11.0 8.9 - 12.9 FL    NRBC 0.0 0  WBC    ABSOLUTE NRBC 0.00 0.00 - 0.01 K/uL    NEUTROPHILS 53 32 - 75 %    LYMPHOCYTES 35 12 - 49 %    MONOCYTES 6 5 - 13 %    EOSINOPHILS 5 0 - 7 %    BASOPHILS 1 0 - 1 %    IMMATURE GRANULOCYTES 0 0.0 - 0.5 %    ABS. NEUTROPHILS 5.7 1.8 - 8.0 K/UL    ABS. LYMPHOCYTES 3.7 (H) 0.8 - 3.5 K/UL    ABS. MONOCYTES 0.7 0.0 - 1.0 K/UL    ABS. EOSINOPHILS 0.5 (H) 0.0 - 0.4 K/UL    ABS. BASOPHILS 0.1 0.0 - 0.1 K/UL    ABS. IMM. GRANS. 0.0 0.00 - 0.04 K/UL    DF AUTOMATED     METABOLIC PANEL, COMPREHENSIVE    Collection Time: 05/13/20  9:08 PM   Result Value Ref Range    Sodium 138 136 - 145 mmol/L    Potassium 3.4 (L) 3.5 - 5.1 mmol/L    Chloride 105 97 - 108 mmol/L    CO2 27 21 - 32 mmol/L    Anion gap 6 5 - 15 mmol/L    Glucose 86 65 - 100 mg/dL    BUN 9 6 - 20 MG/DL    Creatinine 0.71 0.55 - 1.02 MG/DL    BUN/Creatinine ratio 13 12 - 20      GFR est AA >60 >60 ml/min/1.73m2    GFR est non-AA >60 >60 ml/min/1.73m2    Calcium 9.7 8.5 - 10.1 MG/DL    Bilirubin, total 0.4 0.2 - 1.0 MG/DL    ALT (SGPT) 18 12 - 78 U/L    AST (SGOT) 13 (L) 15 - 37 U/L    Alk.  phosphatase 54 45 - 117 U/L    Protein, total 8.1 6.4 - 8.2 g/dL    Albumin 4.3 3.5 - 5.0 g/dL    Globulin 3.8 2.0 - 4.0 g/dL    A-G Ratio 1.1 1.1 - 2.2     LIPASE    Collection Time: 05/13/20  9:08 PM   Result Value Ref Range    Lipase 110 73 - 393 U/L   URINALYSIS W/MICROSCOPIC    Collection Time: 05/13/20  9:57 PM   Result Value Ref Range    Color YELLOW/STRAW      Appearance CLEAR CLEAR      Specific gravity 1.021 1.003 - 1.030      pH (UA) 5.5 5.0 - 8.0      Protein Negative NEG mg/dL    Glucose Negative NEG mg/dL    Ketone Negative NEG mg/dL    Bilirubin Negative NEG      Blood Negative NEG      Urobilinogen 0.2 0.2 - 1.0 EU/dL    Nitrites Negative NEG      Leukocyte Esterase Negative NEG      WBC 0-4 0 - 4 /hpf    RBC 0-5 0 - 5 /hpf    Epithelial cells FEW FEW /lpf    Bacteria Negative NEG /hpf    Hyaline cast 0-2 0 - 5 /lpf   URINE CULTURE HOLD SAMPLE    Collection Time: 05/13/20  9:57 PM   Result Value Ref Range    Urine culture hold        Urine on hold in Microbiology dept for 2 days. If unpreserved urine is submitted, it cannot be used for addtional testing after 24 hours, recollection will be required. HCG URINE, QL. - POC    Collection Time: 05/13/20  9:57 PM   Result Value Ref Range    Pregnancy test,urine (POC) Negative NEG         Assessment:Plan   Symptomatic Cholelithiasis   The patient has had 2-3 ER visits for gallstones in the past several days. Does not appear that she will be able to make it to her elective laparoscopic cholecystectomy. She will be admitted for observation with plans for laparoscopic cholecystectomy with cholangiogram tomorrow. Dilaudid and Zofran as needed.

## 2020-05-15 NOTE — ANESTHESIA PREPROCEDURE EVALUATION
Relevant Problems   No relevant active problems       Anesthetic History   No history of anesthetic complications            Review of Systems / Medical History  Patient summary reviewed, nursing notes reviewed and pertinent labs reviewed    Pulmonary  Within defined limits                 Neuro/Psych   Within defined limits           Cardiovascular  Within defined limits                Exercise tolerance: >4 METS     GI/Hepatic/Renal  Within defined limits              Endo/Other  Within defined limits           Other Findings   Comments: Alaina  12 wk s/p delicvey   breastfeeding         Physical Exam    Airway  Mallampati: II  TM Distance: > 6 cm  Neck ROM: normal range of motion   Mouth opening: Normal     Cardiovascular  Regular rate and rhythm,  S1 and S2 normal,  no murmur, click, rub, or gallop             Dental  No notable dental hx       Pulmonary  Breath sounds clear to auscultation               Abdominal  GI exam deferred       Other Findings            Anesthetic Plan    ASA: 2  Anesthesia type: general          Induction: Intravenous  Anesthetic plan and risks discussed with: Patient

## 2020-05-15 NOTE — ED NOTES
Dr. Tre Echeverria states pt may have had an order for COVID testing as an outpatient d/t an outpatient surgery, however, at this time he states she does not need one.

## 2020-05-15 NOTE — BRIEF OP NOTE
Brief Postoperative Note    Patient: Criselda Flores  YOB: 1995  MRN: [id number]    Date of Procedure: 5/15/2020     Pre-Op Diagnosis: Acute cholecystitis    Post-Op Diagnosis: Same as preoperative diagnosis.       Procedure(s):  CHOLECYSTECTOMY LAPAROSCOPIC    Surgeon(s):  Stanley Reece MD    Surgical Assistant: Darlin Edwards    Anesthesia: General     Estimated Blood Loss (mL): Minimal    Complications: None    Specimens:   ID Type Source Tests Collected by Time Destination   1 : Gallbladder Fresh Gallbladder  Stanley Reece MD 5/15/2020 1232 Pathology        Implants: * No implants in log *    Drains: * No LDAs found *    Findings: severely inflamed gallbladder, with hydrops    Electronically Signed by Cleo Junior MD on 5/15/2020 at 1:21 PM  [id number]

## 2020-05-15 NOTE — PERIOP NOTES
Patient does not have COVID-19 results. Surgeon notified by OR director. Surgeon would like to proceed with case. Case performed as scheduled.

## 2020-05-15 NOTE — PERIOP NOTES
TRANSFER - OUT REPORT:    Verbal report given to Vania(name) on Selena Sierra  being transferred to 500(unit) for routine post - op       Report consisted of patients Situation, Background, Assessment and   Recommendations(SBAR). Time Pre op antibiotic given:1214  Anesthesia Stop time: 2280  Erazo Present on Transfer to floor:no  Order for Erazo on Chart:no  Discharge Prescriptions with Chart:no    Information from the following report(s) SBAR, Kardex, OR Summary, Procedure Summary, Intake/Output, MAR, Recent Results and Med Rec Status was reviewed with the receiving nurse. Opportunity for questions and clarification was provided. Is the patient on 02? NO       L/Min        Other     Is the patient on a monitor? NO    Is the nurse transporting with the patient? NO    Surgical Waiting Area notified of patient's transfer from PACU? NO      The following personal items collected during your admission accompanied patient upon transfer:   Dental Appliance: Dental Appliances: None  Vision: Visual Aid: None  Hearing Aid:    Jewelry: Jewelry: Other (comment)(wedding ring)  Clothing: Clothing: (clothing  bag to pacu)  Other Valuables:  Other Valuables: Melissa Lis, Cell Phone  Valuables sent to safe:      No clothes to pacu

## 2020-05-15 NOTE — ROUTINE PROCESS
TRANSFER - OUT REPORT: 
 
Verbal report given to Debbie Chandler RN(name) on Selena Sierra  being transferred to (unit) for routine progression of care Report consisted of patients Situation, Background, Assessment and  
Recommendations(SBAR). Information from the following report(s) SBAR, ED Summary, [facility] and Recent Results was reviewed with the receiving nurse. Lines:  
Peripheral IV 05/14/20 Left Antecubital (Active) Opportunity for questions and clarification was provided. Patient transported with: 
 [employer]

## 2020-05-15 NOTE — PROGRESS NOTES
Bedside shift change report given to [address] [facility] (oncoming nurse) by Estela Garcia (offgoing nurse). Report included the following information SBAR, Kardex and MAR.

## 2020-05-15 NOTE — ANESTHESIA POSTPROCEDURE EVALUATION
Post-Anesthesia Evaluation and Assessment    Patient: Marlen Matthew MRN: [id number]  SSN: [id number]    YOB: 1995  Age: 21 y.o. Sex: female      I have evaluated the patient and they are stable and ready for discharge from the PACU. Cardiovascular Function/Vital Signs  Visit Vitals  /68   Pulse 93   Temp 36.6 °C (97.9 °F)   Resp 23   Ht 5' 5\" (1.651 m)   Wt 79 kg (174 lb 2.6 oz)   SpO2 97%   Breastfeeding Yes   BMI 28.98 kg/m²       Patient is status post General anesthesia for Procedure(s):  CHOLECYSTECTOMY LAPAROSCOPIC. Nausea/Vomiting: None    Postoperative hydration reviewed and adequate. Pain:  Pain Scale 1: Numeric (0 - 10) (05/15/20 1336)  Pain Intensity 1: 0 (05/15/20 1336)   Managed    Neurological Status:   Neuro (WDL): Within Defined Limits (05/15/20 1336)   At baseline    Mental Status, Level of Consciousness: Alert and  oriented to person, place, and time    Pulmonary Status:   O2 Device: Room air (05/15/20 1336)   Adequate oxygenation and airway patent    Complications related to anesthesia: None    Post-anesthesia assessment completed. No concerns    Signed By: Natalie Hollis MD     May 15, 2020              Procedure(s):  CHOLECYSTECTOMY LAPAROSCOPIC. general    <BSHSIANPOST>    Vitals Value Taken Time   /68 5/15/2020  1:45 PM   Temp 36.6 °C (97.9 °F) 5/15/2020  1:36 PM   Pulse 90 5/15/2020  1:59 PM   Resp 21 5/15/2020  1:59 PM   SpO2 97 % 5/15/2020  1:59 PM   Vitals shown include unvalidated device data.

## 2020-05-15 NOTE — PROGRESS NOTES
KAYLEIGH:  Expected discharge is home. Attempted to deliver observation notice, pt currently off floor in surgery and unable to receive.     REN Lane

## 2020-05-15 NOTE — ROUTINE PROCESS
TRANSFER - IN REPORT: 
 
Verbal report received from Lissy on [address]  being received from [facility](unit) for ordered procedure Report consisted of patients Situation, Background, Assessment and  
Recommendations(SBAR). Information from the following report(s) SBAR was reviewed with the receiving nurse. Opportunity for questions and clarification was provided. Assessment completed upon patients arrival to unit and care assumed.

## 2020-05-16 VITALS
TEMPERATURE: 98.3 F | WEIGHT: 174.16 LBS | DIASTOLIC BLOOD PRESSURE: 74 MMHG | SYSTOLIC BLOOD PRESSURE: 115 MMHG | HEIGHT: 65 IN | HEART RATE: 93 BPM | RESPIRATION RATE: 16 BRPM | OXYGEN SATURATION: 97 % | BODY MASS INDEX: 29.02 KG/M2

## 2020-05-16 PROCEDURE — 74011250636 HC RX REV CODE- 250/636: Performed by: SURGERY

## 2020-05-16 PROCEDURE — 74011250637 HC RX REV CODE- 250/637: Performed by: NURSE PRACTITIONER

## 2020-05-16 PROCEDURE — 99218 HC RM OBSERVATION: CPT

## 2020-05-16 PROCEDURE — 74011000258 HC RX REV CODE- 258: Performed by: SURGERY

## 2020-05-16 RX ORDER — HYDROCODONE BITARTRATE AND ACETAMINOPHEN 5; 325 MG/1; MG/1
1 TABLET ORAL
Qty: 20 TAB | Refills: 0 | Status: SHIPPED | OUTPATIENT
Start: 2020-05-16 | End: 2020-05-21

## 2020-05-16 RX ADMIN — PIPERACILLIN AND TAZOBACTAM 3.38 G: 3; .375 INJECTION, POWDER, FOR SOLUTION INTRAVENOUS at 03:07

## 2020-05-16 RX ADMIN — PIPERACILLIN AND TAZOBACTAM 3.38 G: 3; .375 INJECTION, POWDER, FOR SOLUTION INTRAVENOUS at 09:14

## 2020-05-16 RX ADMIN — OXYCODONE HYDROCHLORIDE AND ACETAMINOPHEN 1 TABLET: 5; 325 TABLET ORAL at 09:14

## 2020-05-16 RX ADMIN — OXYCODONE HYDROCHLORIDE AND ACETAMINOPHEN 1 TABLET: 5; 325 TABLET ORAL at 03:14

## 2020-05-16 NOTE — PROGRESS NOTES
Progress Note    Patient: Kurt Gonzales MRN: [id number]  SSN: [id number]    YOB: 1995  Age: 21 y.o. Sex: female      Admit Date: 2020    1 Day Post-Op    Procedure:  Procedure(s):  CHOLECYSTECTOMY LAPAROSCOPIC    Subjective:     No acute surgical issues. Pt reported soreness at midline incision. Tolerating diet without nausea or vomiting. Objective:     Visit Vitals  /74   Pulse 93   Temp 98.3 °F (36.8 °C)   Resp 16   Ht 5' 5\" (1.651 m)   Wt 174 lb 2.6 oz (79 kg)   SpO2 97%   Breastfeeding Yes   BMI 28.98 kg/m²       Temp (24hrs), Av.3 °F (36.8 °C), Min:97.9 °F (36.6 °C), Max:98.8 °F (37.1 °C)        Physical Exam:    Gen:  NAD  Pulm:  Unlabored  Abd:  S/ND/appropriate TTP  Wound:  C/D/I    No results found for this or any previous visit (from the past 24 hour(s)).       Assessment:     Hospital Problems  Date Reviewed: 5/15/2020          Codes Class Noted POA    Gallstones ICD-10-CM: K80.20  ICD-9-CM: 574.20  2020 Unknown              Plan/Recommendations/Medical Decision Making:     - Diet as tolerated  - Pain control  - Plan DC home this am

## 2020-05-16 NOTE — DISCHARGE INSTRUCTIONS
Patient Education     1. Do not drive while on pain medication. You should take a stool softener while on pain medication to prevent constipation. 2.  Do not lift anything greater than 10 pounds for 2 weeks. 3.  You may resume your home medications. 4.  You may shower but do not swim or soak in tub for 2 weeks. 5.  Please call [phone] to schedule a follow-up with Dr. Dat Donovan within 2 weeks. Cholecystectomy: What to Expect at [address] [place] [address]  After your surgery, it is normal to feel weak and tired for several days after you return home. Your belly may be swollen. If you had laparoscopic surgery, you may also have pain in your shoulder for about 24 hours. You may have gas or need to burp a lot at first, and a few people get diarrhea. The diarrhea usually goes away in 2 to 4 weeks, but it may last longer. How quickly you recover depends on whether you had a laparoscopic or open surgery. · For a laparoscopic surgery, most people can go back to work or their normal routine in 1 to 2 weeks, but it may take longer, depending on the type of work you do. · For an open surgery, it will probably take 4 to 6 weeks before you get back to your normal routine. This care sheet gives you a general idea about how long it will take for you to recover. However, each person recovers at a different pace. Follow the steps below to get better as quickly as possible. How can you care for yourself at home? Activity    · Rest when you feel tired. Getting enough sleep will help you recover.     · Try to walk each day. Start out by walking a little more than you did the day before. Gradually increase the amount you walk. Walking boosts blood flow and helps prevent pneumonia and constipation.     · For about 2 to 4 weeks, avoid lifting anything that would make you strain.  This may include a child, heavy grocery bags and milk containers, a heavy briefcase or backpack, cat litter or dog food bags, or a vacuum .     · Avoid strenuous activities, such as biking, jogging, weightlifting, and aerobic exercise, until your doctor says it is okay.     · You may shower 24 to 48 hours after surgery, if your doctor okays it. Pat the cut (incision) dry. Do not take a bath for the first 2 weeks, or until your doctor tells you it is okay.     · You may drive when you are no longer taking pain medicine and can quickly move your foot from the gas pedal to the brake. You must also be able to sit comfortably for a long period of time, even if you do not plan to go far. You might get caught in traffic.     · For a laparoscopic surgery, most people can go back to work or their normal routine in 1 to 2 weeks, but it may take longer. For an open surgery, it will probably take 4 to 6 weeks before you get back to your normal routine.     · Your doctor will tell you when you can have sex again.    Diet    · Eat smaller meals more often instead of fewer larger meals. You can eat a normal diet, but avoid eating fatty foods for about 1 month. Fatty foods include hamburger, whole milk, cheese, and many snack foods. If your stomach is upset, try bland, low-fat foods like plain rice, broiled chicken, toast, and yogurt.     · Drink plenty of fluids (unless your doctor tells you not to).   · If you have diarrhea, try avoiding spicy foods, dairy products, fatty foods, and alcohol. You can also watch to see if specific foods cause it, and stop eating them. If the diarrhea continues for more than 2 weeks, talk to your doctor.     · You may notice that your bowel movements are not regular right after your surgery. This is common. Try to avoid constipation and straining with bowel movements. You may want to take a fiber supplement every day. If you have not had a bowel movement after a couple of days, ask your doctor about taking a mild laxative. Medicines    · Your doctor will tell you if and when you can restart your medicines.  He or she will also give you instructions about taking any new medicines.     · If you take aspirin or some other blood thinner, ask your doctor if and when to start taking it again. Make sure that you understand exactly what your doctor wants you to do.     · Take pain medicines exactly as directed. ? If the doctor gave you a prescription medicine for pain, take it as prescribed. ? If you are not taking a prescription pain medicine, take an over-the-counter medicine such as acetaminophen (Tylenol), ibuprofen (Advil, Motrin), or naproxen (Aleve). Read and follow all instructions on the label. ? Do not take two or more pain medicines at the same time unless the doctor told you to. Many pain medicines contain acetaminophen, which is Tylenol. Too much Tylenol can be harmful.     · If you think your pain medicine is making you sick to your stomach:  ? Take your medicine after meals (unless your doctor tells you not to). ? Ask your doctor for a different pain medicine.     · If your doctor prescribed antibiotics, take them as directed. Do not stop taking them just because you feel better. You need to take the full course of antibiotics. Incision care    · If you have strips of tape on the incision, or cut, leave the tape on for a week or until it falls off.     · After 24 to 48 hours, wash the area daily with warm, soapy water, and pat it dry.     · You may have staples to hold the cut together. Keep them dry until your doctor takes them out. This is usually in 7 to 10 days.     · Keep the area clean and dry. You may cover it with a gauze bandage if it weeps or rubs against clothing. Change the bandage every day. Ice    · To reduce swelling and pain, put ice or a cold pack on your belly for 10 to 20 minutes at a time. Do this every 1 to 2 hours. Put a thin cloth between the ice and your skin. Follow-up care is a key part of your treatment and safety.  Be sure to make and go to all appointments, and call your doctor if you are having problems. It's also a good idea to know your test results and keep a list of the medicines you take. When should you call for help? Call 911 anytime you think you may need emergency care. For example, call if:    · You passed out (lost consciousness).     · You are short of breath. Levohumbertoe Glendaa your doctor now or seek immediate medical care if:    · You are sick to your stomach and cannot drink fluids.     · You have pain that does not get better when you take your pain medicine.     · You cannot pass stools or gas.     · You have signs of infection, such as:  ? Increased pain, swelling, warmth, or redness. ? Red streaks leading from the incision. ? Pus draining from the incision. ? A fever.     · Bright red blood has soaked through the bandage over your incision.     · You have loose stitches, or your incision comes open.     · You have signs of a blood clot in your leg (called a deep vein thrombosis), such as:  ? Pain in your calf, back of knee, thigh, or groin. ? Redness and swelling in your leg or groin.    Watch closely for any changes in your health, and be sure to contact your doctor if you have any problems. Where can you learn more? Go to http://antonella-cynthia.info/  Enter F357 in the search box to learn more about \"Cholecystectomy: What to Expect at Home. \"  Current as of: 2019Content Version: 12.4  © 8482-9604 [employer]. Care instructions adapted under license by [employer] (which disclaims liability or warranty for this information). If you have questions about a medical condition or this instruction, always ask your healthcare professional. [place] [address] any warranty or liability for your use of this information.            Patient Discharge Instructions    Marline Valle / [id number] : 1995    Admitted 2020 Discharged: 5/15/2020     Take Home Medications            · It is important that you take the medication exactly as they are prescribed. · Keep your medication in the bottles provided by the pharmacist and keep a list of the medication names, dosages, and times to be taken in your wallet. · Do not take other medications without consulting your doctor. What to do at Home    Recommended diet: Regular Diet,     Recommended activity: Activity as tolerated, may shower whenever you wish          Follow-up Appointments   Procedures    FOLLOW UP VISIT Appointment in: Two Weeks     Standing Status:   Standing     Number of Occurrences:   1     Order Specific Question:   Appointment in     Answer: Two Weeks           Information obtained by :  I understand that if any problems occur once I am at home I am to contact my physician. I understand and acknowledge receipt of the instructions indicated above.                                                                                                                                            Physician's or R.N.'s Signature                                                                  Date/Time                                                                                                                                              Patient or Representative Signature                                                          Date/Time

## 2020-05-16 NOTE — PROGRESS NOTES
1115 Patient educated with discharge instructions and Rx. Patient verbalized understanding with adequate teach back. Patient signed copy of the discharge instructions that were then placed on the hard chart. 1200 Patient supplied norco returned from inpatient pharmacy via primary RN to patient, paperwork on hard chart.

## 2020-05-21 ENCOUNTER — TELEPHONE (OUTPATIENT)
Dept: SURGERY | Age: 25
End: 2020-05-21

## 2020-05-21 ENCOUNTER — HOSPITAL ENCOUNTER (EMERGENCY)
Age: 25
Discharge: HOME | DRG: 395 | End: 2020-05-21
Attending: EMERGENCY MEDICINE
Payer: OTHER GOVERNMENT

## 2020-05-21 VITALS
TEMPERATURE: 98.9 F | BODY MASS INDEX: 29.16 KG/M2 | HEIGHT: 65 IN | OXYGEN SATURATION: 95 % | HEART RATE: 98 BPM | WEIGHT: 175 LBS | SYSTOLIC BLOOD PRESSURE: 124 MMHG | RESPIRATION RATE: 16 BRPM | DIASTOLIC BLOOD PRESSURE: 70 MMHG

## 2020-05-21 DIAGNOSIS — R74.01 TRANSAMINITIS: ICD-10-CM

## 2020-05-21 DIAGNOSIS — M62.838 MUSCLE SPASM: ICD-10-CM

## 2020-05-21 DIAGNOSIS — M54.6 ACUTE BILATERAL THORACIC BACK PAIN: Primary | ICD-10-CM

## 2020-05-21 LAB
ALBUMIN SERPL-MCNC: 3.3 G/DL (ref 3.5–5)
ALBUMIN/GLOB SERPL: 0.8 {RATIO} (ref 1.1–2.2)
ALP SERPL-CCNC: 155 U/L (ref 45–117)
ALT SERPL-CCNC: 61 U/L (ref 12–78)
AMORPH CRY URNS QL MICRO: ABNORMAL
ANION GAP SERPL CALC-SCNC: 7 MMOL/L (ref 5–15)
APPEARANCE UR: ABNORMAL
AST SERPL-CCNC: 174 U/L (ref 15–37)
BACTERIA URNS QL MICRO: ABNORMAL /HPF
BASOPHILS # BLD: 0 K/UL (ref 0–0.1)
BASOPHILS NFR BLD: 0 % (ref 0–1)
BILIRUB SERPL-MCNC: 0.4 MG/DL (ref 0.2–1)
BILIRUB UR QL: NEGATIVE
BUN SERPL-MCNC: 13 MG/DL (ref 6–20)
BUN/CREAT SERPL: 19 (ref 12–20)
CALCIUM SERPL-MCNC: 9.4 MG/DL (ref 8.5–10.1)
CHLORIDE SERPL-SCNC: 104 MMOL/L (ref 97–108)
CO2 SERPL-SCNC: 27 MMOL/L (ref 21–32)
COLOR UR: ABNORMAL
CREAT SERPL-MCNC: 0.68 MG/DL (ref 0.55–1.02)
DIFFERENTIAL METHOD BLD: ABNORMAL
EOSINOPHIL # BLD: 0.2 K/UL (ref 0–0.4)
EOSINOPHIL NFR BLD: 2 % (ref 0–7)
EPITH CASTS URNS QL MICRO: ABNORMAL /LPF
ERYTHROCYTE [DISTWIDTH] IN BLOOD BY AUTOMATED COUNT: 12.5 % (ref 11.5–14.5)
GLOBULIN SER CALC-MCNC: 4.1 G/DL (ref 2–4)
GLUCOSE SERPL-MCNC: 110 MG/DL (ref 65–100)
GLUCOSE UR STRIP.AUTO-MCNC: NEGATIVE MG/DL
HCG UR QL: NEGATIVE
HCT VFR BLD AUTO: 35.7 % (ref 35–47)
HGB BLD-MCNC: 11.5 G/DL (ref 11.5–16)
HGB UR QL STRIP: NEGATIVE
IMM GRANULOCYTES # BLD AUTO: 0 K/UL (ref 0–0.04)
IMM GRANULOCYTES NFR BLD AUTO: 0 % (ref 0–0.5)
KETONES UR QL STRIP.AUTO: NEGATIVE MG/DL
LEUKOCYTE ESTERASE UR QL STRIP.AUTO: NEGATIVE
LIPASE SERPL-CCNC: 117 U/L (ref 73–393)
LYMPHOCYTES # BLD: 1.6 K/UL (ref 0.8–3.5)
LYMPHOCYTES NFR BLD: 15 % (ref 12–49)
MCH RBC QN AUTO: 27.7 PG (ref 26–34)
MCHC RBC AUTO-ENTMCNC: 32.2 G/DL (ref 30–36.5)
MCV RBC AUTO: 86 FL (ref 80–99)
MONOCYTES # BLD: 0.7 K/UL (ref 0–1)
MONOCYTES NFR BLD: 7 % (ref 5–13)
NEUTS SEG # BLD: 8.2 K/UL (ref 1.8–8)
NEUTS SEG NFR BLD: 76 % (ref 32–75)
NITRITE UR QL STRIP.AUTO: NEGATIVE
NRBC # BLD: 0 K/UL (ref 0–0.01)
NRBC BLD-RTO: 0 PER 100 WBC
PH UR STRIP: 7 [PH] (ref 5–8)
PLATELET # BLD AUTO: 322 K/UL (ref 150–400)
PMV BLD AUTO: 10.8 FL (ref 8.9–12.9)
POTASSIUM SERPL-SCNC: 3.5 MMOL/L (ref 3.5–5.1)
PROT SERPL-MCNC: 7.4 G/DL (ref 6.4–8.2)
PROT UR STRIP-MCNC: NEGATIVE MG/DL
RBC # BLD AUTO: 4.15 M/UL (ref 3.8–5.2)
RBC #/AREA URNS HPF: ABNORMAL /HPF (ref 0–5)
SODIUM SERPL-SCNC: 138 MMOL/L (ref 136–145)
SP GR UR REFRACTOMETRY: 1.02 (ref 1–1.03)
UR CULT HOLD, URHOLD: NORMAL
UROBILINOGEN UR QL STRIP.AUTO: 1 EU/DL (ref 0.2–1)
WBC # BLD AUTO: 10.8 K/UL (ref 3.6–11)
WBC URNS QL MICRO: ABNORMAL /HPF (ref 0–4)

## 2020-05-21 PROCEDURE — 85025 COMPLETE CBC W/AUTO DIFF WBC: CPT

## 2020-05-21 PROCEDURE — 74011250636 HC RX REV CODE- 250/636: Performed by: EMERGENCY MEDICINE

## 2020-05-21 PROCEDURE — 81025 URINE PREGNANCY TEST: CPT

## 2020-05-21 PROCEDURE — 99284 EMERGENCY DEPT VISIT MOD MDM: CPT

## 2020-05-21 PROCEDURE — 80053 COMPREHEN METABOLIC PANEL: CPT

## 2020-05-21 PROCEDURE — 36415 COLL VENOUS BLD VENIPUNCTURE: CPT

## 2020-05-21 PROCEDURE — 83690 ASSAY OF LIPASE: CPT

## 2020-05-21 PROCEDURE — 81001 URINALYSIS AUTO W/SCOPE: CPT

## 2020-05-21 PROCEDURE — 74011250637 HC RX REV CODE- 250/637: Performed by: EMERGENCY MEDICINE

## 2020-05-21 RX ORDER — METHOCARBAMOL 500 MG/1
500 TABLET, FILM COATED ORAL
Qty: 20 TAB | Refills: 0 | Status: SHIPPED | OUTPATIENT
Start: 2020-05-21 | End: 2020-05-26

## 2020-05-21 RX ORDER — METHOCARBAMOL 500 MG/1
500 TABLET, FILM COATED ORAL ONCE
Status: COMPLETED | OUTPATIENT
Start: 2020-05-21 | End: 2020-05-21

## 2020-05-21 RX ADMIN — SODIUM CHLORIDE 1000 ML: 900 INJECTION, SOLUTION INTRAVENOUS at 04:26

## 2020-05-21 RX ADMIN — METHOCARBAMOL TABLETS 500 MG: 500 TABLET, COATED ORAL at 04:29

## 2020-05-21 NOTE — ED PROVIDER NOTES
65-year-old female presenting to the ER complaining of bilateral thoracic back pain. Patient status post gallbladder surgery approximately 1 week ago. Patient reports that her abdominal pain has been improving throughout the week. And no report of any fever or chills. She had been titrating back on her pain medications. No nausea or vomiting. Patient had initially had some burning with urination in the beginning of the week which has since resolved. Patient does report that yesterday she started moving around and doing more. No specific heavy lifting however she did lift her 1month-old daughter several times throughout the day. When she woke up this morning having pain in bilateral back worsened with movement. She took her pain medication, hydrocodone. By time patient arrived to ER pain had resolved and patient not having any significant symptoms at this time. Past Medical History:   Diagnosis Date    Anemia     Anemia affecting pregnancy in third trimester 12/26/2019    Anxiety 5/7/2017    Back muscle spasm 1/14/2018    Breast lump on left side at 2 o'clock position 5/7/2017    Gall stones     Gallstone pancreatitis 5/13/2020    Pancreatitis     Psychiatric problem     Anxiety, no current medications       No past surgical history on file. No family history on file.     Social History     Socioeconomic History    Marital status:      Spouse name: Not on file    Number of children: Not on file    Years of education: Not on file    Highest education level: Not on file   Occupational History    Not on file   Social Needs    Financial resource strain: Not on file    Food insecurity     Worry: Not on file     Inability: Not on file    Transportation needs     Medical: Not on file     Non-medical: Not on file   Tobacco Use    Smoking status: Never Smoker    Smokeless tobacco: Never Used   Substance and Sexual Activity    Alcohol use: Not Currently     Comment: occasional     Drug use: No    Sexual activity: Yes     Partners: Male   Lifestyle    Physical activity     Days per week: Not on file     Minutes per session: Not on file    Stress: Not on file   Relationships    Social connections     Talks on phone: Not on file     Gets together: Not on file     Attends Taoist service: Not on file     Active member of club or organization: Not on file     Attends meetings of clubs or organizations: Not on file     Relationship status: Not on file    Intimate partner violence     Fear of current or ex partner: Not on file     Emotionally abused: Not on file     Physically abused: Not on file     Forced sexual activity: Not on file   Other Topics Concern     Service Not Asked    Blood Transfusions Not Asked    Caffeine Concern Not Asked    Occupational Exposure Not Asked   Dewanda Cherelle Hazards Not Asked    Sleep Concern Not Asked    Stress Concern Not Asked    Weight Concern Not Asked    Special Diet Not Asked    Back Care Not Asked    Exercise Not Asked    Bike Helmet Not Asked   [address] Not Asked    Self-Exams Not Asked   Social History Narrative    Not on file         ALLERGIES: Patient has no known allergies. Review of Systems   Constitutional: Negative for chills and fever. HENT: Negative for congestion and sore throat. Eyes: Negative for pain. Respiratory: Negative for shortness of breath. Cardiovascular: Negative for chest pain. Gastrointestinal: Negative for abdominal pain, diarrhea, nausea and vomiting. Genitourinary: Negative for difficulty urinating, dysuria, flank pain, vaginal discharge and vaginal pain. Musculoskeletal: Positive for back pain. Negative for neck pain. Skin: Negative for rash. Neurological: Negative for dizziness and headaches.        Vitals:    05/21/20 0321   BP: 124/70   Pulse: 98   Resp: 16   Temp: 98.9 °F (37.2 °C)   SpO2: 95%   Weight: 79.4 kg (175 lb)   Height: 5' 5\" (1.651 m)            Physical Exam  Constitutional:       Appearance: She is well-developed. HENT:      Head: Normocephalic. Eyes:      Conjunctiva/sclera: Conjunctivae normal.   Neck:      Musculoskeletal: Normal range of motion and neck supple. Cardiovascular:      Rate and Rhythm: Normal rate and regular rhythm. Pulmonary:      Effort: Pulmonary effort is normal. No respiratory distress. Breath sounds: Normal breath sounds. Abdominal:      General: Abdomen is flat. Bowel sounds are normal.      Palpations: Abdomen is soft. Tenderness: There is no abdominal tenderness. Comments: Patient has healing incisions that are clean, dry. No erythema or induration. Only mild tenderness in the right upper quadrant. Patient reports that this is significantly improved from previous. Nonacute abdomen. Musculoskeletal: Normal range of motion. Thoracic back: She exhibits tenderness and spasm. She exhibits no bony tenderness and no swelling. Back:    Skin:     General: Skin is warm. Capillary Refill: Capillary refill takes less than 2 seconds. Findings: No rash. Neurological:      Mental Status: She is alert and oriented to person, place, and time. Comments: No gross motor or sensory deficits          MDM  Number of Diagnoses or Management Options  Acute bilateral thoracic back pain:   Muscle spasm:   Transaminitis:   Diagnosis management comments: Patient complaining of back pain which is now resolved/significantly improved after taking her pain meds. No worsening abdominal pain. No nausea or vomiting. Gallbladder surgery went well with the continuing improvement of her abdominal pain and symptoms. No fevers. No dysuria. UA negative. Labs unremarkable except for only minimally elevated AST and ALT. Normal bilirubin and alkaline phosphatase. Normal lipase.   In light of recent gallbladder surgery this may explain minimally elevated AST and ALT  Patient has obvious muscle spasms on the bilateral thoracic back. We will start patient on Robaxin. Advised to follow-up with her family doctor and repeat liver function tests in 1 week. Discussed the discharge impression and any labs and the results with the patient. Answered any questions and addressed any concerns. Discussed the importance of following up with their primary care provider and/or specialist.  Discussed signs or symptoms that would warrant return back to the ER for further evaluation. The patient is agreeable with discharge. Amount and/or Complexity of Data Reviewed  Clinical lab tests: reviewed           Procedures      Recent Results (from the past 24 hour(s))   URINALYSIS W/MICROSCOPIC    Collection Time: 05/21/20  4:29 AM   Result Value Ref Range    Color YELLOW/STRAW      Appearance TURBID (A) CLEAR      Specific gravity 1.020 1.003 - 1.030      pH (UA) 7.0 5.0 - 8.0      Protein Negative NEG mg/dL    Glucose Negative NEG mg/dL    Ketone Negative NEG mg/dL    Bilirubin Negative NEG      Blood Negative NEG      Urobilinogen 1.0 0.2 - 1.0 EU/dL    Nitrites Negative NEG      Leukocyte Esterase Negative NEG      WBC 0-4 0 - 4 /hpf    RBC 5-10 0 - 5 /hpf    Epithelial cells FEW FEW /lpf    Bacteria 1+ (A) NEG /hpf    Amorphous Crystals 1+ (A) NEG   URINE CULTURE HOLD SAMPLE    Collection Time: 05/21/20  4:29 AM   Result Value Ref Range    Urine culture hold        Urine on hold in Microbiology dept for 2 days. If unpreserved urine is submitted, it cannot be used for addtional testing after 24 hours, recollection will be required.    CBC WITH AUTOMATED DIFF    Collection Time: 05/21/20  4:29 AM   Result Value Ref Range    WBC 10.8 3.6 - 11.0 K/uL    RBC 4.15 3.80 - 5.20 M/uL    HGB 11.5 11.5 - 16.0 g/dL    HCT 35.7 35.0 - 47.0 %    MCV 86.0 80.0 - 99.0 FL    MCH 27.7 26.0 - 34.0 PG    MCHC 32.2 30.0 - 36.5 g/dL    RDW 12.5 11.5 - 14.5 %    PLATELET 126 715 - 781 K/uL    MPV 10.8 8.9 - 12.9 FL    NRBC 0.0 0  WBC    ABSOLUTE NRBC 0.00 0.00 - 0.01 K/uL    NEUTROPHILS 76 (H) 32 - 75 %    LYMPHOCYTES 15 12 - 49 %    MONOCYTES 7 5 - 13 %    EOSINOPHILS 2 0 - 7 %    BASOPHILS 0 0 - 1 %    IMMATURE GRANULOCYTES 0 0.0 - 0.5 %    ABS. NEUTROPHILS 8.2 (H) 1.8 - 8.0 K/UL    ABS. LYMPHOCYTES 1.6 0.8 - 3.5 K/UL    ABS. MONOCYTES 0.7 0.0 - 1.0 K/UL    ABS. EOSINOPHILS 0.2 0.0 - 0.4 K/UL    ABS. BASOPHILS 0.0 0.0 - 0.1 K/UL    ABS. IMM. GRANS. 0.0 0.00 - 0.04 K/UL    DF AUTOMATED     METABOLIC PANEL, COMPREHENSIVE    Collection Time: 05/21/20  4:29 AM   Result Value Ref Range    Sodium 138 136 - 145 mmol/L    Potassium 3.5 3.5 - 5.1 mmol/L    Chloride 104 97 - 108 mmol/L    CO2 27 21 - 32 mmol/L    Anion gap 7 5 - 15 mmol/L    Glucose 110 (H) 65 - 100 mg/dL    BUN 13 6 - 20 MG/DL    Creatinine 0.68 0.55 - 1.02 MG/DL    BUN/Creatinine ratio 19 12 - 20      GFR est AA >60 >60 ml/min/1.73m2    GFR est non-AA >60 >60 ml/min/1.73m2    Calcium 9.4 8.5 - 10.1 MG/DL    Bilirubin, total 0.4 0.2 - 1.0 MG/DL    ALT (SGPT) 61 12 - 78 U/L    AST (SGOT) 174 (H) 15 - 37 U/L    Alk. phosphatase 155 (H) 45 - 117 U/L    Protein, total 7.4 6.4 - 8.2 g/dL    Albumin 3.3 (L) 3.5 - 5.0 g/dL    Globulin 4.1 (H) 2.0 - 4.0 g/dL    A-G Ratio 0.8 (L) 1.1 - 2.2     LIPASE    Collection Time: 05/21/20  4:29 AM   Result Value Ref Range    Lipase 117 73 - 393 U/L   HCG URINE, QL. - POC    Collection Time: 05/21/20  4:34 AM   Result Value Ref Range    Pregnancy test,urine (POC) Negative NEG         No results found.

## 2020-05-21 NOTE — ED TRIAGE NOTES
Pt arrives via EMS from home for 10/10 upper back pain since 0215 that has reduced to 4/10 with hydrocodone. Pt had gallbladder removed on Friday.

## 2020-05-21 NOTE — TELEPHONE ENCOUNTER
Patient identified with two patient identifiers. Patient states she was seen in the ER this morning started having lower back pain and spasming all across. Labs completed in ER. Patient concerned about spasms placed on medication. No other complaints currently. Discussed with NP will schedule VV for tomorrow morning. Patient in agreement.

## 2020-05-22 ENCOUNTER — OFFICE VISIT (OUTPATIENT)
Dept: SURGERY | Age: 25
End: 2020-05-22

## 2020-05-22 VITALS — BODY MASS INDEX: 29.16 KG/M2 | HEIGHT: 65 IN | WEIGHT: 175 LBS

## 2020-05-22 DIAGNOSIS — M62.830 MUSCLE SPASM OF BACK: ICD-10-CM

## 2020-05-22 DIAGNOSIS — Z09 FOLLOW-UP EXAMINATION AFTER ABDOMINAL SURGERY: Primary | ICD-10-CM

## 2020-05-22 DIAGNOSIS — R79.89 ELEVATED LFTS: ICD-10-CM

## 2020-05-22 NOTE — PROGRESS NOTES
1. Have you been to the ER, urgent care clinic since your last visit? Hospitalized since your last visit? No    2. Have you seen or consulted any other health care providers outside of the [address] [place] [address] since your last visit? Include any pap smears or colon screening.  No

## 2020-05-22 NOTE — PATIENT INSTRUCTIONS
I placed a lab order to recheck your labs. You can go to any [facility] in about 1 week. You do not need a paper order and Yo can access the request in the system. For locations, visit www. [facility]     For back pain, continue to use ice if that is helpful. Try Lidocaine patches (you can find at any drug store in the pain reliever section). Place 1-3 patches in the area of pain. Avoid fatty foods as may cause diarrhea and upset stomach     Avoid lifting > 15-20 lbs (ok to lift the baby) and avoid straining     Walk throughout the day. Walking helps your body process the gas that was absorbed from surgery. Work on drinking more fluids, especially water. Your urine should be light yellow to clear. Back Stretches: Exercises  Introduction  Here are some examples of exercises for stretching your back. Start each exercise slowly. Ease off the exercise if you start to have pain. Your doctor or physical therapist will tell you when you can start these exercises and which ones will work best for you. How to do the exercises  Overhead stretch   1. Stand comfortably with your feet shoulder-width apart. 2. Looking straight ahead, raise both arms over your head and reach toward the ceiling. Do not allow your head to tilt back. 3. Hold for 15 to 30 seconds, then lower your arms to your sides. 4. Repeat 2 to 4 times. Side stretch   1. Stand comfortably with your feet shoulder-width apart. 2. Raise one arm over your head, and then lean to the other side. 3. Slide your hand down your leg as you let the weight of your arm gently stretch your side muscles. Hold for 15 to 30 seconds. 4. Repeat 2 to 4 times on each side. Press-up   1. Lie on your stomach, supporting your body with your forearms. 2. Press your elbows down into the floor to raise your upper back. As you do this, relax your stomach muscles and allow your back to arch without using your back muscles.  As your press up, do not let your hips or pelvis come off the floor. 3. Hold for 15 to 30 seconds, then relax. 4. Repeat 2 to 4 times. Relax and rest   1. Lie on your back with a rolled towel under your neck and a pillow under your knees. Extend your arms comfortably to your sides. 2. Relax and breathe normally. 3. Remain in this position for about 10 minutes. 4. If you can, do this 2 or 3 times each day. Follow-up care is a key part of your treatment and safety. Be sure to make and go to all appointments, and call your doctor if you are having problems. It's also a good idea to know your test results and keep a list of the medicines you take. Where can you learn more? Go to http://antonellaIQMScynthia.info/  Enter Y090 in the search box to learn more about \"Back Stretches: Exercises. \"  Current as of: June 26, 2019Content Version: 12.4  © 1723-4900 [employer]. Care instructions adapted under license by [employer] (which disclaims liability or warranty for this information). If you have questions about a medical condition or this instruction, always ask your healthcare professional. [address] any warranty or liability for your use of this information. Acute Low Back Pain: Exercises  Introduction  Here are some examples of typical rehabilitation exercises for your condition. Start each exercise slowly. Ease off the exercise if you start to have pain. Your doctor or physical therapist will tell you when you can start these exercises and which ones will work best for you. When you are not being active, find a comfortable position for rest. Some people are comfortable on the floor or a medium-firm bed with a small pillow under their head and another under their knees. Some people prefer to lie on their side with a pillow between their knees. Don't stay in one position for too long. Take short walks (10 to 20 minutes) every 2 to 3 hours.  Avoid slopes, hills, and stairs until you feel better. Walk only distances you can manage without pain, especially leg pain. How to do the exercises  Back stretches   1. Get down on your hands and knees on the floor. 2. Relax your head and allow it to droop. Round your back up toward the ceiling until you feel a nice stretch in your upper, middle, and lower back. Hold this stretch for as long as it feels comfortable, or about 15 to 30 seconds. 3. Return to the starting position with a flat back while you are on your hands and knees. 4. Let your back sway by pressing your stomach toward the floor. Lift your buttocks toward the ceiling. 5. Hold this position for 15 to 30 seconds. 6. Repeat 2 to 4 times. Follow-up care is a key part of your treatment and safety. Be sure to make and go to all appointments, and call your doctor if you are having problems. It's also a good idea to know your test results and keep a list of the medicines you take. Where can you learn more? Go to http://antonella-cynthia.info/  Enter Z071 in the search box to learn more about \"Acute Low Back Pain: Exercises. \"  Current as of: June 26, 2019Content Version: 12.4  © 8849-9400 [employer]. Care instructions adapted under license by [employer] (which disclaims liability or warranty for this information). If you have questions about a medical condition or this instruction, always ask your healthcare professional. [address] any warranty or liability for your use of this information.

## 2020-05-23 ENCOUNTER — ANESTHESIA EVENT (OUTPATIENT)
Dept: SURGERY | Age: 25
DRG: 395 | End: 2020-05-23
Payer: OTHER GOVERNMENT

## 2020-05-23 ENCOUNTER — ANESTHESIA (OUTPATIENT)
Dept: SURGERY | Age: 25
DRG: 395 | End: 2020-05-23
Payer: OTHER GOVERNMENT

## 2020-05-23 ENCOUNTER — APPOINTMENT (OUTPATIENT)
Dept: GENERAL RADIOLOGY | Age: 25
DRG: 395 | End: 2020-05-23
Attending: SPECIALIST
Payer: OTHER GOVERNMENT

## 2020-05-23 ENCOUNTER — HOSPITAL ENCOUNTER (INPATIENT)
Age: 25
LOS: 3 days | Discharge: HOME | DRG: 395 | End: 2020-05-26
Attending: EMERGENCY MEDICINE | Admitting: SURGERY
Payer: OTHER GOVERNMENT

## 2020-05-23 DIAGNOSIS — K91.89 POSTOPERATIVE SURGICAL COMPLICATION INVOLVING DIGESTIVE SYSTEM ASSOCIATED WITH DIGESTIVE SYSTEM PROCEDURE, UNSPECIFIED COMPLICATION: Primary | ICD-10-CM

## 2020-05-23 PROBLEM — K83.8 BILE LEAK, POSTOPERATIVE: Status: ACTIVE | Noted: 2020-05-23

## 2020-05-23 PROCEDURE — 74011000258 HC RX REV CODE- 258: Performed by: SURGERY

## 2020-05-23 PROCEDURE — 76010000149 HC OR TIME 1 TO 1.5 HR: Performed by: SPECIALIST

## 2020-05-23 PROCEDURE — 77030026438 HC STYL ET INTUB CARD -A: Performed by: ANESTHESIOLOGY

## 2020-05-23 PROCEDURE — 74011636320 HC RX REV CODE- 636/320: Performed by: SPECIALIST

## 2020-05-23 PROCEDURE — 99284 EMERGENCY DEPT VISIT MOD MDM: CPT

## 2020-05-23 PROCEDURE — 74011000250 HC RX REV CODE- 250: Performed by: SURGERY

## 2020-05-23 PROCEDURE — 74011250636 HC RX REV CODE- 250/636: Performed by: SURGERY

## 2020-05-23 PROCEDURE — 65270000032 HC RM SEMIPRIVATE

## 2020-05-23 PROCEDURE — 0F798ZZ DILATION OF COMMON BILE DUCT, VIA NATURAL OR ARTIFICIAL OPENING ENDOSCOPIC: ICD-10-PCS | Performed by: SPECIALIST

## 2020-05-23 PROCEDURE — 0FC98ZZ EXTIRPATION OF MATTER FROM COMMON BILE DUCT, VIA NATURAL OR ARTIFICIAL OPENING ENDOSCOPIC: ICD-10-PCS | Performed by: SPECIALIST

## 2020-05-23 PROCEDURE — 74330 X-RAY BILE/PANC ENDOSCOPY: CPT

## 2020-05-23 PROCEDURE — 76060000034 HC ANESTHESIA 1.5 TO 2 HR: Performed by: SPECIALIST

## 2020-05-23 PROCEDURE — 77030008684 HC TU ET CUF COVD -B: Performed by: ANESTHESIOLOGY

## 2020-05-23 PROCEDURE — 76210000006 HC OR PH I REC 0.5 TO 1 HR: Performed by: SPECIALIST

## 2020-05-23 PROCEDURE — 77030012596 HC SPHNTOM BILI BSC -E: Performed by: SPECIALIST

## 2020-05-23 PROCEDURE — C2625 STENT, NON-COR, TEM W/DEL SY: HCPCS | Performed by: SPECIALIST

## 2020-05-23 PROCEDURE — 77030009038 HC CATH BILI STN RTVR BSC -C: Performed by: SPECIALIST

## 2020-05-23 PROCEDURE — 74011250636 HC RX REV CODE- 250/636: Performed by: NURSE ANESTHETIST, CERTIFIED REGISTERED

## 2020-05-23 PROCEDURE — 77030011640 HC PAD GRND REM COVD -A: Performed by: SPECIALIST

## 2020-05-23 PROCEDURE — 74011250637 HC RX REV CODE- 250/637: Performed by: SURGERY

## 2020-05-23 PROCEDURE — 74011000250 HC RX REV CODE- 250: Performed by: NURSE ANESTHETIST, CERTIFIED REGISTERED

## 2020-05-23 DEVICE — BILIARY STENT WITH NAVIFLEXTM RX DELIVERY SYSTEM
Type: IMPLANTABLE DEVICE | Site: BILE DUCT | Status: FUNCTIONAL
Brand: ADVANIX™ BILIARY

## 2020-05-23 RX ORDER — SODIUM CHLORIDE 0.9 % (FLUSH) 0.9 %
5-40 SYRINGE (ML) INJECTION AS NEEDED
Status: DISCONTINUED | OUTPATIENT
Start: 2020-05-23 | End: 2020-05-26 | Stop reason: HOSPADM

## 2020-05-23 RX ORDER — SODIUM CHLORIDE 9 MG/ML
50 INJECTION, SOLUTION INTRAVENOUS CONTINUOUS
Status: DISPENSED | OUTPATIENT
Start: 2020-05-23 | End: 2020-05-23

## 2020-05-23 RX ORDER — ONDANSETRON 2 MG/ML
INJECTION INTRAMUSCULAR; INTRAVENOUS AS NEEDED
Status: DISCONTINUED | OUTPATIENT
Start: 2020-05-23 | End: 2020-05-23 | Stop reason: HOSPADM

## 2020-05-23 RX ORDER — SERTRALINE HYDROCHLORIDE 50 MG/1
25 TABLET, FILM COATED ORAL DAILY
Status: DISCONTINUED | OUTPATIENT
Start: 2020-05-24 | End: 2020-05-26 | Stop reason: HOSPADM

## 2020-05-23 RX ORDER — PHENYLEPHRINE HCL IN 0.9% NACL 0.4MG/10ML
SYRINGE (ML) INTRAVENOUS AS NEEDED
Status: DISCONTINUED | OUTPATIENT
Start: 2020-05-23 | End: 2020-05-23 | Stop reason: HOSPADM

## 2020-05-23 RX ORDER — SODIUM CHLORIDE, SODIUM LACTATE, POTASSIUM CHLORIDE, CALCIUM CHLORIDE 600; 310; 30; 20 MG/100ML; MG/100ML; MG/100ML; MG/100ML
75 INJECTION, SOLUTION INTRAVENOUS CONTINUOUS
Status: DISCONTINUED | OUTPATIENT
Start: 2020-05-23 | End: 2020-05-26 | Stop reason: HOSPADM

## 2020-05-23 RX ORDER — HYDROCODONE BITARTRATE AND ACETAMINOPHEN 5; 325 MG/1; MG/1
1 TABLET ORAL
COMMUNITY
End: 2020-05-26 | Stop reason: SDUPTHER

## 2020-05-23 RX ORDER — PROPOFOL 10 MG/ML
INJECTION, EMULSION INTRAVENOUS AS NEEDED
Status: DISCONTINUED | OUTPATIENT
Start: 2020-05-23 | End: 2020-05-23 | Stop reason: HOSPADM

## 2020-05-23 RX ORDER — DEXAMETHASONE SODIUM PHOSPHATE 4 MG/ML
INJECTION, SOLUTION INTRA-ARTICULAR; INTRALESIONAL; INTRAMUSCULAR; INTRAVENOUS; SOFT TISSUE AS NEEDED
Status: DISCONTINUED | OUTPATIENT
Start: 2020-05-23 | End: 2020-05-23 | Stop reason: HOSPADM

## 2020-05-23 RX ORDER — SODIUM CHLORIDE 0.9 % (FLUSH) 0.9 %
5-40 SYRINGE (ML) INJECTION EVERY 8 HOURS
Status: DISCONTINUED | OUTPATIENT
Start: 2020-05-23 | End: 2020-05-26 | Stop reason: HOSPADM

## 2020-05-23 RX ORDER — ONDANSETRON 2 MG/ML
4 INJECTION INTRAMUSCULAR; INTRAVENOUS
Status: DISCONTINUED | OUTPATIENT
Start: 2020-05-23 | End: 2020-05-26 | Stop reason: HOSPADM

## 2020-05-23 RX ORDER — DOCUSATE SODIUM 100 MG/1
100 CAPSULE, LIQUID FILLED ORAL DAILY
COMMUNITY

## 2020-05-23 RX ORDER — DIPHENHYDRAMINE HYDROCHLORIDE 50 MG/ML
12.5 INJECTION, SOLUTION INTRAMUSCULAR; INTRAVENOUS
Status: DISCONTINUED | OUTPATIENT
Start: 2020-05-23 | End: 2020-05-26 | Stop reason: HOSPADM

## 2020-05-23 RX ORDER — DEXTROMETHORPHAN/PSEUDOEPHED 2.5-7.5/.8
1.2 DROPS ORAL
Status: DISCONTINUED | OUTPATIENT
Start: 2020-05-23 | End: 2020-05-26 | Stop reason: HOSPADM

## 2020-05-23 RX ORDER — HYDROMORPHONE HYDROCHLORIDE 1 MG/ML
.5-1 INJECTION, SOLUTION INTRAMUSCULAR; INTRAVENOUS; SUBCUTANEOUS
Status: DISCONTINUED | OUTPATIENT
Start: 2020-05-23 | End: 2020-05-26 | Stop reason: HOSPADM

## 2020-05-23 RX ORDER — NALOXONE HYDROCHLORIDE 0.4 MG/ML
0.4 INJECTION, SOLUTION INTRAMUSCULAR; INTRAVENOUS; SUBCUTANEOUS AS NEEDED
Status: DISCONTINUED | OUTPATIENT
Start: 2020-05-23 | End: 2020-05-26 | Stop reason: HOSPADM

## 2020-05-23 RX ORDER — SUCCINYLCHOLINE CHLORIDE 20 MG/ML
INJECTION INTRAMUSCULAR; INTRAVENOUS AS NEEDED
Status: DISCONTINUED | OUTPATIENT
Start: 2020-05-23 | End: 2020-05-23 | Stop reason: HOSPADM

## 2020-05-23 RX ORDER — ACETAMINOPHEN 325 MG/1
650 TABLET ORAL
Status: DISCONTINUED | OUTPATIENT
Start: 2020-05-23 | End: 2020-05-26 | Stop reason: HOSPADM

## 2020-05-23 RX ORDER — LIDOCAINE HYDROCHLORIDE 20 MG/ML
INJECTION, SOLUTION EPIDURAL; INFILTRATION; INTRACAUDAL; PERINEURAL AS NEEDED
Status: DISCONTINUED | OUTPATIENT
Start: 2020-05-23 | End: 2020-05-23 | Stop reason: HOSPADM

## 2020-05-23 RX ORDER — ROCURONIUM BROMIDE 10 MG/ML
INJECTION, SOLUTION INTRAVENOUS AS NEEDED
Status: DISCONTINUED | OUTPATIENT
Start: 2020-05-23 | End: 2020-05-23 | Stop reason: HOSPADM

## 2020-05-23 RX ORDER — SERTRALINE HYDROCHLORIDE 50 MG/1
TABLET, FILM COATED ORAL DAILY
COMMUNITY

## 2020-05-23 RX ORDER — SODIUM CHLORIDE, SODIUM LACTATE, POTASSIUM CHLORIDE, CALCIUM CHLORIDE 600; 310; 30; 20 MG/100ML; MG/100ML; MG/100ML; MG/100ML
INJECTION, SOLUTION INTRAVENOUS
Status: DISCONTINUED | OUTPATIENT
Start: 2020-05-23 | End: 2020-05-23 | Stop reason: HOSPADM

## 2020-05-23 RX ADMIN — PIPERACILLIN AND TAZOBACTAM 3.38 G: 3; .375 INJECTION, POWDER, FOR SOLUTION INTRAVENOUS at 22:24

## 2020-05-23 RX ADMIN — HYDROMORPHONE HYDROCHLORIDE 0.5 MG: 1 INJECTION, SOLUTION INTRAMUSCULAR; INTRAVENOUS; SUBCUTANEOUS at 15:43

## 2020-05-23 RX ADMIN — LIDOCAINE HYDROCHLORIDE 100 MG: 20 INJECTION, SOLUTION EPIDURAL; INFILTRATION; INTRACAUDAL; PERINEURAL at 17:33

## 2020-05-23 RX ADMIN — ACETAMINOPHEN 650 MG: 325 TABLET, FILM COATED ORAL at 20:15

## 2020-05-23 RX ADMIN — PIPERACILLIN AND TAZOBACTAM 3.38 G: 3; .375 INJECTION, POWDER, LYOPHILIZED, FOR SOLUTION INTRAVENOUS at 15:33

## 2020-05-23 RX ADMIN — ONDANSETRON HYDROCHLORIDE 4 MG: 2 INJECTION, SOLUTION INTRAMUSCULAR; INTRAVENOUS at 17:40

## 2020-05-23 RX ADMIN — Medication 80 MCG: at 18:02

## 2020-05-23 RX ADMIN — Medication 40 MCG: at 18:00

## 2020-05-23 RX ADMIN — ROCURONIUM BROMIDE 5 MG: 10 SOLUTION INTRAVENOUS at 17:33

## 2020-05-23 RX ADMIN — SUCCINYLCHOLINE CHLORIDE 180 MG: 20 INJECTION, SOLUTION INTRAMUSCULAR; INTRAVENOUS at 17:33

## 2020-05-23 RX ADMIN — DEXAMETHASONE SODIUM PHOSPHATE 4 MG: 4 INJECTION, SOLUTION INTRAMUSCULAR; INTRAVENOUS at 17:40

## 2020-05-23 RX ADMIN — FAMOTIDINE 20 MG: 10 INJECTION, SOLUTION INTRAVENOUS at 20:08

## 2020-05-23 RX ADMIN — PROPOFOL 200 MG: 10 INJECTION, EMULSION INTRAVENOUS at 17:33

## 2020-05-23 RX ADMIN — SODIUM CHLORIDE, POTASSIUM CHLORIDE, SODIUM LACTATE AND CALCIUM CHLORIDE: 600; 310; 30; 20 INJECTION, SOLUTION INTRAVENOUS at 17:24

## 2020-05-23 RX ADMIN — ONDANSETRON 4 MG: 2 INJECTION INTRAMUSCULAR; INTRAVENOUS at 20:08

## 2020-05-23 NOTE — CONSULTS
Gastroenterology Consultation Note  Dr. Olamide West    NAME: Mainor Bae : 1995 MRN: [id number]   PCP: Giselle Diaz NP  Date/Time:  2020 3:07 PM  Subjective:   REASON FOR CONSULT:      Julian Wynn is a 21 y.o.  female who I was asked to see for LFT elevation, Shoulder pain 8 days after laprascopic cholecystectomy 5/15/20 with Dr. Prather Come for acute cholecystitis. She started to develop back spasms per pt on Wednesday and was seen in the ER 20 and had a mild LFT elevation with , AST  174, ALT  61 and T bili 0.4. Then today started to have pain in L shoulder and went to [address] [place] [address] ER and her LFTs had spiked :  Labs were done recently at [facility] today:  WBC 10.3  Bilirubin 2.4    (174)   ( 61)  Alk phos 354 ( 155)   had a CT scan showing Minimal free fluid in the gallbladder bed no biliary ductal dilatation with density fluid in the pelvis measuring 10 x 7.5 x 3.5, and 5.5 x 2 x 3.5. Past Medical History:   Diagnosis Date    Anemia     Anemia affecting pregnancy in third trimester 2019    Anxiety 2017    Back muscle spasm 2018    Breast lump on left side at 2 o'clock position 2017    Gall stones     Gallstone pancreatitis 2020    Pancreatitis     Psychiatric problem     Anxiety, no current medications      Past Surgical History:   Procedure Laterality Date    HX LAP CHOLECYSTECTOMY  05/15/2020     Social History     Tobacco Use    Smoking status: Never Smoker    Smokeless tobacco: Never Used   Substance Use Topics    Alcohol use: Not Currently     Comment: occasional       History reviewed. No pertinent family history. No Known Allergies   Home Medications:  Prior to Admission Medications   Prescriptions Last Dose Informant Patient Reported? Taking?    HYDROcodone-acetaminophen (NORCO) 5-325 mg per tablet 2020 at Unknown time  Yes Yes   Sig: Take 1 Tab by mouth every six (6) hours as needed for Pain. 5 day supply #20   docusate sodium (Colace) 100 mg capsule 5/23/2020 at Unknown time  Yes Yes   Sig: Take 100 mg by mouth daily. methocarbamoL (Robaxin) 500 mg tablet 5/16/2020 at Unknown time  No Yes   Sig: Take 1 Tab by mouth four (4) times daily as needed (muscle spasm/pain) for up to 5 days. Indications: muscle spasm   prenatal 95/XJLW fum/folic/dha (PRENATAL-1 PO) 5/16/2020 at Unknown time  Yes Yes   Sig: Take 1 Tab by mouth daily. sertraline (ZOLOFT) 50 mg tablet 5/21/2020  Yes Yes   Sig: Take  by mouth daily. Facility-Administered Medications: None     Hospital medications:  Current Facility-Administered Medications   Medication Dose Route Frequency    sodium chloride (NS) flush 5-40 mL  5-40 mL IntraVENous Q8H    sodium chloride (NS) flush 5-40 mL  5-40 mL IntraVENous PRN    lactated Ringers infusion  125 mL/hr IntraVENous CONTINUOUS    acetaminophen (TYLENOL) tablet 650 mg  650 mg Oral Q6H PRN    HYDROmorphone (PF) (DILAUDID) injection 0.5-1 mg  0.5-1 mg IntraVENous Q3H PRN    naloxone (NARCAN) injection 0.4 mg  0.4 mg IntraVENous PRN    ondansetron (ZOFRAN) injection 4 mg  4 mg IntraVENous Q4H PRN    diphenhydrAMINE (BENADRYL) injection 12.5 mg  12.5 mg IntraVENous Q6H PRN    piperacillin-tazobactam (ZOSYN) 3.375 g in 0.9% sodium chloride (MBP/ADV) 100 mL  3.375 g IntraVENous Q8H    famotidine (PF) (PEPCID) 20 mg in 0.9% sodium chloride 10 mL injection  20 mg IntraVENous Q12H    [START ON 5/24/2020] sertraline (ZOLOFT) tablet 25 mg  25 mg Oral DAILY    piperacillin-tazobactam (ZOSYN) 3.375 g in 0.9% sodium chloride (MBP/ADV) 100 mL  3.375 g IntraVENous ONCE     Current Outpatient Medications   Medication Sig    HYDROcodone-acetaminophen (NORCO) 5-325 mg per tablet Take 1 Tab by mouth every six (6) hours as needed for Pain. 5 day supply #20    sertraline (ZOLOFT) 50 mg tablet Take  by mouth daily.  docusate sodium (Colace) 100 mg capsule Take 100 mg by mouth daily.  methocarbamoL (Robaxin) 500 mg tablet Take 1 Tab by mouth four (4) times daily as needed (muscle spasm/pain) for up to 5 days. Indications: muscle spasm    prenatal 42/RCNV fum/folic/dha (PRENATAL-1 PO) Take 1 Tab by mouth daily. REVIEW OF SYSTEMS:      Review of Systems -   History obtained from chart review and the patient  General ROS: positive for  - fatigue  Psychological ROS: negative  Hematological and Lymphatic ROS: negative for - bleeding problems or blood clots  Respiratory ROS: no cough or SOB  Cardiovascular ROS: positive for - dyspnea on exertion  Gastrointestinal ROS: see HPI  Genito-Urinary ROS: no dysuria, trouble voiding, or hematuria  Musculoskeletal ROS: negative  Neurological ROS: no TIA or stroke symptoms  Dermatological ROS: negative for - pruritus or rash    Objective:   VITALS:    Visit Vitals  /78   Pulse (!) 107   Temp 98.3 °F (36.8 °C)   Resp 15   SpO2 97%     Temp (24hrs), Av.3 °F (36.8 °C), Min:98.3 °F (36.8 °C), Max:98.3 °F (36.8 °C)    PHYSICAL EXAM:   General:    Alert, cooperative, young WF in no distress, appears stated age. Head:   Normocephalic, without obvious abnormality, atraumatic. Eyes:   Conjunctivae clear, anicteric sclerae. Nose:  Nares normal. No drainage or sinus tenderness. Throat:    Lips, mucosa, and tongue normal.   Neck:  Supple, symmetrical,  no adenopathy, thyroid: non tender  Back:    Symmetric,  No CVA tenderness. Lungs:   CTA bilaterally. No wheezing/rhonchi/rales. Heart:   Regular rate and rhythm,  no murmur, rub or gallop. Abdomen:   Soft, healing incisions. Some tenderness in the lower abdomen without rebound or guarding, nondistended, + BS  Rectal:  deferred  Extremities: No cyanosis. No edema. No clubbing  Skin:     Texture, turgor normal. No rashes/lesions/jaundice  Lymph: Cervical, supraclavicular normal.  Psych:  Good insight. Not depressed. Not anxious or agitated. Neurologic: EOMs intact. No facial asymmetry.  No aphasia or slurred speech normal strength, A/O X 3. LAB DATA REVIEWED:    Lab Results   Component Value Date/Time    WBC 10.8 05/21/2020 04:29 AM    HGB 11.5 05/21/2020 04:29 AM    HCT 35.7 05/21/2020 04:29 AM    PLATELET 376 71/98/8130 04:29 AM    MCV 86.0 05/21/2020 04:29 AM     Lab Results   Component Value Date/Time    ALT (SGPT) 61 05/21/2020 04:29 AM    AST (SGOT) 174 (H) 05/21/2020 04:29 AM    Alk. phosphatase 155 (H) 05/21/2020 04:29 AM    Bilirubin, total 0.4 05/21/2020 04:29 AM     Lab Results   Component Value Date/Time    Sodium 138 05/21/2020 04:29 AM    Potassium 3.5 05/21/2020 04:29 AM    Chloride 104 05/21/2020 04:29 AM    CO2 27 05/21/2020 04:29 AM    Anion gap 7 05/21/2020 04:29 AM    Glucose 110 (H) 05/21/2020 04:29 AM    BUN 13 05/21/2020 04:29 AM    Creatinine 0.68 05/21/2020 04:29 AM    BUN/Creatinine ratio 19 05/21/2020 04:29 AM    GFR est AA >60 05/21/2020 04:29 AM    GFR est non-AA >60 05/21/2020 04:29 AM    Calcium 9.4 05/21/2020 04:29 AM     Lab Results   Component Value Date/Time    Lipase 117 05/21/2020 04:29 AM       Impression:    Acute LFT elevation 8 days post cholecystectomy    Free fluid in pelvis and in GB fossa c/w Bile leak, postoperative   Plan:  Patient's presentation is c/w post operative bile leak. She had a purulent GB at surgery c/w acute cholecystitis and her LFT elevation may be due to liver irration from adjacent post op fluid collection with referred pain to L shoulder. I have discussed with pt and her  as well as surgery that pt should as a next step have an ERCP to allow for sphincterotomy and possible stent placement once a bile leak identified. This would be under general anesthesia. Risks of bleeding, infection, perforation, pancreatitis were outlined with pt and agrees to proceed today with ERCP. Will notify OR and endoscopy staff to proceed.        ___________________________________________________  Care Plan discussed with:    [x]    Patient [x]    Family   [x]    Nursing   [x]    Attending   ___________________________________________________  GI: Gino Maharaj MD

## 2020-05-23 NOTE — ED PROVIDER NOTES
23-year-old female arrives in transfer from [address] [place] [address] where she was being evaluated for abdominal pain and left shoulder pain. She had a laparoscopic cholecystectomy by Cayden King on May 15. She presented to [address] [place] [address] with increased pain this morning. She was found to have markedly elevated liver function tests at that time with a CAT scan of her abdomen pelvis that showed some fluid in the pelvis. She denies any fever. White count was normal.  No other complaints at this time. She was transferred to [facility] for continuity of care. Past Medical History:   Diagnosis Date    Anemia     Anemia affecting pregnancy in third trimester 12/26/2019    Anxiety 5/7/2017    Back muscle spasm 1/14/2018    Breast lump on left side at 2 o'clock position 5/7/2017    Gall stones     Gallstone pancreatitis 5/13/2020    Pancreatitis     Psychiatric problem     Anxiety, no current medications       Past Surgical History:   Procedure Laterality Date    HX LAP CHOLECYSTECTOMY  05/15/2020         History reviewed. No pertinent family history.     Social History     Socioeconomic History    Marital status:      Spouse name: Not on file    Number of children: Not on file    Years of education: Not on file    Highest education level: Not on file   Occupational History    Not on file   Social Needs    Financial resource strain: Not on file    Food insecurity     Worry: Not on file     Inability: Not on file    Transportation needs     Medical: Not on file     Non-medical: Not on file   Tobacco Use    Smoking status: Never Smoker    Smokeless tobacco: Never Used   Substance and Sexual Activity    Alcohol use: Not Currently     Comment: occasional     Drug use: No    Sexual activity: Yes     Partners: Male   Lifestyle    Physical activity     Days per week: Not on file     Minutes per session: Not on file    Stress: Not on file   Relationships    Social connections     Talks on phone: Not on file     Gets together: Not on file     Attends Restoration service: Not on file     Active member of club or organization: Not on file     Attends meetings of clubs or organizations: Not on file     Relationship status: Not on file    Intimate partner violence     Fear of current or ex partner: Not on file     Emotionally abused: Not on file     Physically abused: Not on file     Forced sexual activity: Not on file   Other Topics Concern     Service Not Asked    Blood Transfusions Not Asked    Caffeine Concern Not Asked    Occupational Exposure Not Asked   Nichelle [employer] Hazards Not Asked    Sleep Concern Not Asked    Stress Concern Not Asked    Weight Concern Not Asked    Special Diet Not Asked    Back Care Not Asked    Exercise Not Asked    Bike Helmet Not Asked   [place] Not Asked    Self-Exams Not Asked   Social History Narrative    Not on file         ALLERGIES: Patient has no known allergies. Review of Systems   Constitutional: Negative for fever. HENT: Negative for facial swelling. Eyes: Negative for visual disturbance. Respiratory: Negative for chest tightness. Cardiovascular: Negative for chest pain. Gastrointestinal: Positive for abdominal pain. Genitourinary: Negative for difficulty urinating and dysuria. Musculoskeletal: Negative for arthralgias. Skin: Negative for rash. Neurological: Negative for headaches. Hematological: Negative for adenopathy. Psychiatric/Behavioral: Negative for suicidal ideas. Vitals:    05/23/20 1338   BP: 145/85   Pulse: 95   Resp: 16   Temp: 98.3 °F (36.8 °C)   SpO2: 98%            Physical Exam  Vitals signs and nursing note reviewed. Constitutional:       General: She is not in acute distress. Appearance: She is well-developed. HENT:      Head: Normocephalic and atraumatic. Eyes:      General: No scleral icterus.      Conjunctiva/sclera: Conjunctivae normal.      Pupils: Pupils are equal, round, and reactive to light. Neck:      Musculoskeletal: Normal range of motion and neck supple. Cardiovascular:      Rate and Rhythm: Normal rate. Heart sounds: No murmur. Pulmonary:      Effort: Pulmonary effort is normal. No respiratory distress. Abdominal:      General: There is no distension. Tenderness: There is abdominal tenderness. There is guarding. Musculoskeletal: Normal range of motion. Skin:     General: Skin is warm and dry. Findings: No rash. Neurological:      Mental Status: She is alert and oriented to person, place, and time. MDM  Number of Diagnoses or Management Options  Postoperative surgical complication involving digestive system associated with digestive system procedure, unspecified complication:     ED Course as of May 23 1439   Sat May 23, 2020   1358 1:58 PM  Discusssed with Dr. José Miguel Robertson who will see patient. [JM]      ED Course User Index  [JM] Fabi Cadena MD                       2:40 PM  Patient seen by Dr. Bob Bess in the ED. Plan on admitting the patient. No further orders at this time.   Procedures

## 2020-05-23 NOTE — PERIOP NOTES
Endoscope was pre-cleaned at bedside immediately following procedure by Tanika Gr.   Button #: See \"Equipment\"

## 2020-05-23 NOTE — ANESTHESIA PREPROCEDURE EVALUATION
Relevant Problems   No relevant active problems       Anesthetic History   No history of anesthetic complications            Review of Systems / Medical History  Patient summary reviewed, nursing notes reviewed and pertinent labs reviewed    Pulmonary  Within defined limits                 Neuro/Psych   Within defined limits           Cardiovascular  Within defined limits                     GI/Hepatic/Renal  Within defined limits              Endo/Other  Within defined limits      Anemia (31 weeks pregnant)     Other Findings              Physical Exam    Airway  Mallampati: II  TM Distance: > 6 cm  Neck ROM: normal range of motion   Mouth opening: Normal     Cardiovascular  Regular rate and rhythm,  S1 and S2 normal,  no murmur, click, rub, or gallop             Dental  No notable dental hx       Pulmonary  Breath sounds clear to auscultation               Abdominal  GI exam deferred       Other Findings            Anesthetic Plan    ASA: 2  Anesthesia type: general          Induction: Intravenous  Anesthetic plan and risks discussed with: Patient

## 2020-05-23 NOTE — ED TRIAGE NOTES
Pt arrives via EMS from [facility] as a transfer for abnormal labs. Pt had gallbladder removed last Friday. Pt reports abdominal pain radiating to L shoulder and nausea.

## 2020-05-23 NOTE — PERIOP NOTES
TRANSFER - OUT REPORT:    Verbal report given Cameron on Selena Sierra  being transferred to 525 for routine post - op       Report consisted of patients Situation, Background, Assessment and   Recommendations(SBAR). Time Pre op antibiotic given:none  Anesthesia Stop time: 3009  Erazo Present on Transfer to floor none  Order for Erazo on Chart:none  Discharge Prescriptions with Chart:none    Information from the following report(s) OR Summary, Procedure Summary, Intake/Output, MAR, Accordion, Recent Results, Med Rec Status and Cardiac Rhythm nsr was reviewed with the receiving nurse. Opportunity for questions and clarification was provided. Is the patient on 02? NO          Is the patient on a monitor? NO    Is the nurse transporting with the patient? YES    Surgical Waiting Area notified of patient's transfer from PACU? NO.   updated by MD.      The following personal items collected during your admission accompanied patient upon transfer:   Dental Appliance: Dental Appliances: None  Vision: Visual Aid: None  Hearing Aid:    Jewelry:    Clothing: Clothing: None  Other Valuables:    Valuables sent to safe:

## 2020-05-23 NOTE — ANESTHESIA POSTPROCEDURE EVALUATION
Procedure(s):  ENDOSCOPIC RETROGRADE CHOLANGIOPANCREATOGRAPHY (ERCP), sphincterotomy, balloon stone extraction, stent placement. general    Anesthesia Post Evaluation        Patient location during evaluation: PACU  Patient participation: complete - patient participated  Level of consciousness: awake and alert  Pain management: adequate  Airway patency: patent  Anesthetic complications: no  Cardiovascular status: acceptable  Respiratory status: acceptable  Hydration status: acceptable  Comments: I have seen and evaluated the patient and is ready for discharge. Graeme Haines MD    Post anesthesia nausea and vomiting:  none      Vitals Value Taken Time   /84 5/23/2020  7:05 PM   Temp     Pulse 94 5/23/2020  7:07 PM   Resp 20 5/23/2020  7:07 PM   SpO2 100 % 5/23/2020  7:07 PM   Vitals shown include unvalidated device data.

## 2020-05-23 NOTE — ED NOTES
Patient left department for transportation to inpatient bed. Patient's VS at the time of transfer were /78, 96% on RA, medicated for pain prior to transfer, 98.1 orally, HR 78 on the monitor. Patient was alert and oriented x 4 and denies further needs at time of transfer. TRANSFER - OUT REPORT:    Verbal report given to ARLENE Barrientos(name) on Selena Sierra  being transferred to [facility](unit) for routine progression of care       Report consisted of patients Situation, Background, Assessment and   Recommendations(SBAR). Information from the following report(s) SBAR, Kardex, ED Summary, [facility] and Recent Results was reviewed with the receiving nurse. Opportunity for questions and clarification was provided.

## 2020-05-23 NOTE — ROUTINE PROCESS
Upon admission assessment the patient is found sitting in the bed, IV infusion complete, and pumping. Patient VSS. No complaints of pain, N/V. Patient awaiting to be taken down for ERCP. Primary Nurse Jesus Manuel Nick and Melonie Marshall RN performed a dual skin assessment on this patient No impairment noted. Patient has 4 abd lap sites from previous lap choly on 5/15. Don score is 21.

## 2020-05-23 NOTE — ROUTINE PROCESS
TRANSFER - OUT REPORT:    Verbal report given to Mara JACOBS(name) on Selena Sierra  being transferred to OR (unit) for ordered procedure       Report consisted of patients Situation, Background, Assessment and   Recommendations(SBAR). Information from the following report(s) SBAR, Kardex, Intake/Output, MAR and Recent Results was reviewed with the receiving nurse. Lines:   Peripheral IV 05/23/20 Left Antecubital (Active)   Site Assessment Clean, dry, & intact 5/23/2020  1:43 PM   Phlebitis Assessment 0 5/23/2020  1:43 PM   Infiltration Assessment 0 5/23/2020  1:43 PM   Dressing Status Clean, dry, & intact 5/23/2020  1:43 PM   Dressing Type Transparent 5/23/2020  1:43 PM   Hub Color/Line Status Patent; Flushed;Pink 5/23/2020  1:43 PM        Opportunity for questions and clarification was provided.

## 2020-05-23 NOTE — H&P
Surgery History and Physical    Subjective: Armando Banks is a 21 y.o. female who presents complaining of left shoulder pain and lower abdominal pain. The patient underwent a laparoscopic cholecystectomy about 8 days ago. She initially did well and then had significant back and shoulder pain. She was seen in the emergency department where was noted that her LFTs were mildly elevated but this was felt to be a muscle strain. She improved until yesterday when she developed severe left shoulder pain and lower abdominal pain. At that time the patient was instructed to come to the emergency department but she did not. She could no longer handle the pain and decided to go to [facility]. While at her regular doctors she underwent labs and a CT scan. She does complain of lightening of  stools and darkened urine. Patient Active Problem List    Diagnosis Date Noted    Bile leak, postoperative 05/23/2020    Gallstones 05/14/2020    Gallstone pancreatitis 05/13/2020    31 weeks gestation of pregnancy 12/26/2019    Acute midline thoracic back pain 12/26/2019    Anemia affecting pregnancy in third trimester 12/26/2019    Anxiety 05/07/2017     Past Medical History:   Diagnosis Date    Anemia     Anemia affecting pregnancy in third trimester 12/26/2019    Anxiety 5/7/2017    Back muscle spasm 1/14/2018    Breast lump on left side at 2 o'clock position 5/7/2017    Gall stones     Gallstone pancreatitis 5/13/2020    Pancreatitis     Psychiatric problem     Anxiety, no current medications      Past Surgical History:   Procedure Laterality Date    HX LAP CHOLECYSTECTOMY  05/15/2020      Social History     Tobacco Use    Smoking status: Never Smoker    Smokeless tobacco: Never Used   Substance Use Topics    Alcohol use: Not Currently     Comment: occasional       History reviewed. No pertinent family history. Prior to Admission medications    Medication Sig Start Date End Date Taking?  Authorizing Provider   methocarbamoL (Robaxin) 500 mg tablet Take 1 Tab by mouth four (4) times daily as needed (muscle spasm/pain) for up to 5 days. Indications: muscle spasm 5/21/20 5/26/20  Jorge Peguero MD   [facility]) 80 mg chewable tablet Take 1 Tab by mouth four (4) times daily as needed (GI pain). 12/27/19   Catrachita Johansen MD   prenatal 79/QPUR fum/folic/dha (PRENATAL-1 PO) Take 1 Tab by mouth daily. Provider, Historical   ferrous sulfate (SLOW FE PO) Take 1 Tab by mouth. Provider, Historical   sertraline (ZOLOFT) 25 mg tablet Take 1 Tab by mouth daily. 1/8/19   Ethan Shelby MD   RABEprazole (ACIPHEX) 20 mg tablet Take 1 Tab by mouth daily as needed. Indications: HEARTBURN 6/30/17   Ethan Shelby MD     No Known Allergies     Review of Systems:    A comprehensive review of systems was negative except for that written in the History of Present Illness. Objective:     Visit Vitals  /78   Pulse (!) 107   Temp 98.3 °F (36.8 °C)   Resp 15   SpO2 97%       Physical Exam:    GENERAL: alert, cooperative, no distress, appears stated age, EYE: negative, THROAT & NECK: normal, LUNG: clear to auscultation bilaterally, HEART: regular rate and rhythm, ABDOMEN: Soft with tenderness in the bilateral lower quadrants. Incisions are healing well., EXTREMITIES:  no edema, SKIN: Normal., NEUROLOGIC: negative, PSYCH: non focal    Imaging:  images and reports reviewed  CT scan report from [facility] Reviewed  Minimal free fluid in the gallbladder bed no biliary ductal dilatation density fluid in the pelvis measuring 10 x 7.5 x 3.5, and 5.5 x 2 x 3.5. Lab Review:  No results found for this or any previous visit (from the past 24 hour(s)). Labs were done recently at [facility]  WBC 10.3  Bilirubin 2.4    (174)   ( 61)  Alk phos 354 ( 155)  Assessment:Plan    I believe that the patient signs symptoms are  concerning for a bile leak status post cholecystectomy.   I have spoken to GI who will evaluate and probably perform an ERCP. Depending on how she feels after the ERCP she may require some sort of drainage procedure for all of the fluid in her pelvis. I suspect that this is bile which is giving her the  discomfort  N.p.o. for now  Zosyn  Repeat labs in a.m.

## 2020-05-23 NOTE — PROGRESS NOTES
Admission Medication Reconciliation:          Spoke with patient by telephone @ [phone], unable to speak with patient face to face at this time due to general isolation precautions in the ED related to COVID-19 pandemic. Patient is a reliable historian. RX query is available at this time. Interview included questions regarding use of:   PTA medications including prescription/OTC, vitamins, supplements, inhaled, topical, injectable, otic and ophthalmic medications   Alcohol, nicotine products, THC and related compounds, illicit drugs, stimulant use (i.e., Red Bull), agents used to assist with sleep and/or pain control issues, and whether patient uses other people's medications of any kind    Note:  · Birth control pills: RX query shows prescription filled on 5/9/20 for Mara 0.35 mg #84, not yet taking. Medication changes (since last review): Added:   Norco   Methocarbamol    Revised:   Sertraline from 25 to 50 mg daily    Deleted:   Simethicone   Ferrous sulfate (Slow Fe)   Rabeprazole      Thank you for allowing me to participate in the care of your patient. Meryl Benjamin PharmD, RN #[id number] [facility] benefit data reflects medications filled and processed through the patient's insurance, however   this data does NOT capture whether the medication was picked up or is currently being taken by the patient. Allergies:  Patient has no known allergies.     Significant PMH/Disease States:   Past Medical History:   Diagnosis Date    Anemia     Anemia affecting pregnancy in third trimester 12/26/2019    Anxiety 5/7/2017    Back muscle spasm 1/14/2018    Breast lump on left side at 2 o'clock position 5/7/2017    Gall stones     Gallstone pancreatitis 5/13/2020    Pancreatitis     Psychiatric problem     Anxiety, no current medications     Chief Complaint for this Admission:    Chief Complaint   Patient presents with    Post-Op Problem     Prior to Admission Medications:   Prior to Admission Medications   Prescriptions Last Dose Informant Taking? HYDROcodone-acetaminophen (NORCO) 5-325 mg per tablet 5/16/2020 at Unknown time  Yes   Sig: Take 1 Tab by mouth every six (6) hours as needed for Pain. 5 day supply #20   docusate sodium (Colace) 100 mg capsule 5/23/2020 at Unknown time  Yes   Sig: Take 100 mg by mouth daily. methocarbamoL (Robaxin) 500 mg tablet 5/16/2020 at Unknown time  Yes   Sig: Take 1 Tab by mouth four (4) times daily as needed (muscle spasm/pain) for up to 5 days. Indications: muscle spasm   prenatal 39/NFBU fum/folic/dha (PRENATAL-1 PO) 5/16/2020 at Unknown time  Yes   Sig: Take 1 Tab by mouth daily. sertraline (ZOLOFT) 50 mg tablet 5/21/2020  Yes   Sig: Take  by mouth daily. Facility-Administered Medications: None       Please contact the main inpatient pharmacy with any questions or concerns at [phone] and we will direct you to the clinical pharmacist covering this patient's care while in-house.    JONATHAN Salazar

## 2020-05-23 NOTE — PROGRESS NOTES
Bedside shift change report given to Anais E La Tour [address] (oncoming nurse) by Kirill Dempsey RN (offgoing nurse). Report included the following information SBAR, Kardex, Intake/Output, MAR and Recent Results.

## 2020-05-23 NOTE — ROUTINE PROCESS
TRANSFER - IN REPORT:    Verbal report received from Brissa Acosat RN(name) on Selena Sierra  being received from ED(unit) for routine progression of care      Report consisted of patients Situation, Background, Assessment and   Recommendations(SBAR). Information from the following report(s) SBAR, Kardex, Intake/Output, MAR and Recent Results was reviewed with the receiving nurse. Opportunity for questions and clarification was provided. Assessment completed upon patients arrival to unit and care assumed.

## 2020-05-23 NOTE — PROCEDURES
Endoscopic Retrograde Cholangiopancreatography Procedure Note  Dr. Jean Euceda   1995   [id number]     Procedure Type:   Katie ers biliary sphincterotomy, biliary stone removal, biliary stent placement     Indications: biliary or pancreatic leak    Pre-operative Diagnosis: Bile leak    Post-operative Diagnosis: bile duct choledocholithiasis    : Corina Mancini MD    Referring Provider:Giselle Sanabria NP    Sedation:  General anesthesia    Procedure Details:    After informed consent was obtained with all risks and benefits of procedure explained, the patient was taken to the fluoroscopy suite and placed in the prone position. Upon sequential sedation as per above, the Olympus duodenoscope [id number]   was inserted via the mouthpeice and carefully advanced to the second portion of the duodenum. The quality of visualization was adequate. The duodenoscope was withdrawn into a short position. Findings:   Esophagus:normal  Stomach: normal   Duodenum/jejunum: normal    Ampulla:-normal   Cholangiogram:   There was contrast filling the entire CBD and there was then some extravasation of contrast that appeared to be in the vicinity of the cystic duct stump. This was an apparent bile leak. The CBD was 6-7 mm in diameter. We then performed a sphincterotomy and saw fragments of stone debris exiting duct. We elected to then exchange over wire for a balloon catheter 9-12 mm. With 9 mm balloon inflated we removed a stone from the duct and further fragments. We then elected to place a straight stent. Initially 7 fr x 9 cm stent didn't advance easily but the 7 FR x 7 cm stent did more easily advance into the duct. The stent was in good position at conclusion of procedure. Pancreatogram:not performed    Specimen Removed:  None    Complications: None.      EBL: < 20 ml    Interventions:    Pancreatic: none  Biliary: see above    Impression:  Post operative bile leak from the cystic duct s/p ERCP with sphincterotomy and stent placement;   Also Choledocholithiasis s/p stone removal  Recommendations: Clear liquid diet and advance as tolerated  Repeat ERCP in 4 weeks to remove stent          Shadi Meyer MD  5/23/2020  6:44 PM

## 2020-05-23 NOTE — ROUTINE PROCESS
TRANSFER - IN REPORT:    Verbal report received from Fito Martinez RN(name) on Selena Sierra  being received from PACU(unit) for routine post - op      Report consisted of patients Situation, Background, Assessment and   Recommendations(SBAR). Information from the following report(s) SBAR, Kardex, Intake/Output, MAR and Recent Results was reviewed with the receiving nurse. Opportunity for questions and clarification was provided. Assessment completed upon patients arrival to unit and care assumed.

## 2020-05-24 LAB
ALBUMIN SERPL-MCNC: 3.2 G/DL (ref 3.5–5)
ALBUMIN/GLOB SERPL: 0.8 {RATIO} (ref 1.1–2.2)
ALP SERPL-CCNC: 261 U/L (ref 45–117)
ALT SERPL-CCNC: 298 U/L (ref 12–78)
ANION GAP SERPL CALC-SCNC: 9 MMOL/L (ref 5–15)
AST SERPL-CCNC: 143 U/L (ref 15–37)
BASOPHILS # BLD: 0.1 K/UL (ref 0–0.1)
BASOPHILS NFR BLD: 1 % (ref 0–1)
BILIRUB SERPL-MCNC: 0.9 MG/DL (ref 0.2–1)
BUN SERPL-MCNC: 8 MG/DL (ref 6–20)
BUN/CREAT SERPL: 14 (ref 12–20)
CALCIUM SERPL-MCNC: 9.2 MG/DL (ref 8.5–10.1)
CHLORIDE SERPL-SCNC: 104 MMOL/L (ref 97–108)
CO2 SERPL-SCNC: 21 MMOL/L (ref 21–32)
CREAT SERPL-MCNC: 0.59 MG/DL (ref 0.55–1.02)
DIFFERENTIAL METHOD BLD: ABNORMAL
EOSINOPHIL # BLD: 0 K/UL (ref 0–0.4)
EOSINOPHIL NFR BLD: 0 % (ref 0–7)
ERYTHROCYTE [DISTWIDTH] IN BLOOD BY AUTOMATED COUNT: 12.7 % (ref 11.5–14.5)
GLOBULIN SER CALC-MCNC: 4 G/DL (ref 2–4)
GLUCOSE SERPL-MCNC: 93 MG/DL (ref 65–100)
HCT VFR BLD AUTO: 34.9 % (ref 35–47)
HGB BLD-MCNC: 11.2 G/DL (ref 11.5–16)
IMM GRANULOCYTES # BLD AUTO: 0.1 K/UL (ref 0–0.04)
IMM GRANULOCYTES NFR BLD AUTO: 0 % (ref 0–0.5)
LYMPHOCYTES # BLD: 1.8 K/UL (ref 0.8–3.5)
LYMPHOCYTES NFR BLD: 13 % (ref 12–49)
MAGNESIUM SERPL-MCNC: 2 MG/DL (ref 1.6–2.4)
MCH RBC QN AUTO: 27.4 PG (ref 26–34)
MCHC RBC AUTO-ENTMCNC: 32.1 G/DL (ref 30–36.5)
MCV RBC AUTO: 85.3 FL (ref 80–99)
MONOCYTES # BLD: 0.8 K/UL (ref 0–1)
MONOCYTES NFR BLD: 6 % (ref 5–13)
NEUTS SEG # BLD: 11.2 K/UL (ref 1.8–8)
NEUTS SEG NFR BLD: 80 % (ref 32–75)
NRBC # BLD: 0 K/UL (ref 0–0.01)
NRBC BLD-RTO: 0 PER 100 WBC
PHOSPHATE SERPL-MCNC: 3.9 MG/DL (ref 2.6–4.7)
PLATELET # BLD AUTO: 369 K/UL (ref 150–400)
PMV BLD AUTO: 11 FL (ref 8.9–12.9)
POTASSIUM SERPL-SCNC: 3.9 MMOL/L (ref 3.5–5.1)
PROT SERPL-MCNC: 7.2 G/DL (ref 6.4–8.2)
RBC # BLD AUTO: 4.09 M/UL (ref 3.8–5.2)
SODIUM SERPL-SCNC: 134 MMOL/L (ref 136–145)
WBC # BLD AUTO: 13.8 K/UL (ref 3.6–11)

## 2020-05-24 PROCEDURE — 83735 ASSAY OF MAGNESIUM: CPT

## 2020-05-24 PROCEDURE — 74011250636 HC RX REV CODE- 250/636: Performed by: SURGERY

## 2020-05-24 PROCEDURE — 74011000250 HC RX REV CODE- 250: Performed by: SURGERY

## 2020-05-24 PROCEDURE — 74011000258 HC RX REV CODE- 258: Performed by: SURGERY

## 2020-05-24 PROCEDURE — 65270000032 HC RM SEMIPRIVATE

## 2020-05-24 PROCEDURE — 85025 COMPLETE CBC W/AUTO DIFF WBC: CPT

## 2020-05-24 PROCEDURE — 74011250637 HC RX REV CODE- 250/637: Performed by: SURGERY

## 2020-05-24 PROCEDURE — 36415 COLL VENOUS BLD VENIPUNCTURE: CPT

## 2020-05-24 PROCEDURE — 80053 COMPREHEN METABOLIC PANEL: CPT

## 2020-05-24 PROCEDURE — 84100 ASSAY OF PHOSPHORUS: CPT

## 2020-05-24 RX ORDER — NYSTATIN 100000 U/G
CREAM TOPICAL 2 TIMES DAILY
Status: DISCONTINUED | OUTPATIENT
Start: 2020-05-24 | End: 2020-05-26 | Stop reason: HOSPADM

## 2020-05-24 RX ORDER — NYSTATIN 100000 [USP'U]/ML
500000 SUSPENSION ORAL 4 TIMES DAILY
Status: DISCONTINUED | OUTPATIENT
Start: 2020-05-24 | End: 2020-05-26 | Stop reason: HOSPADM

## 2020-05-24 RX ORDER — NYSTATIN 100000 U/G
CREAM TOPICAL 2 TIMES DAILY
Status: DISCONTINUED | OUTPATIENT
Start: 2020-05-24 | End: 2020-05-24

## 2020-05-24 RX ORDER — NYSTATIN 100000 [USP'U]/ML
500000 SUSPENSION ORAL 4 TIMES DAILY
Status: DISCONTINUED | OUTPATIENT
Start: 2020-05-24 | End: 2020-05-24

## 2020-05-24 RX ADMIN — NYSTATIN 500000 UNITS: 100000 SUSPENSION ORAL at 22:05

## 2020-05-24 RX ADMIN — NYSTATIN 500000 UNITS: 100000 SUSPENSION ORAL at 14:45

## 2020-05-24 RX ADMIN — PIPERACILLIN AND TAZOBACTAM 3.38 G: 3; .375 INJECTION, POWDER, FOR SOLUTION INTRAVENOUS at 22:05

## 2020-05-24 RX ADMIN — SODIUM CHLORIDE, SODIUM LACTATE, POTASSIUM CHLORIDE, AND CALCIUM CHLORIDE 125 ML/HR: 600; 310; 30; 20 INJECTION, SOLUTION INTRAVENOUS at 13:32

## 2020-05-24 RX ADMIN — PIPERACILLIN AND TAZOBACTAM 3.38 G: 3; .375 INJECTION, POWDER, FOR SOLUTION INTRAVENOUS at 06:00

## 2020-05-24 RX ADMIN — ACETAMINOPHEN 650 MG: 325 TABLET, FILM COATED ORAL at 01:55

## 2020-05-24 RX ADMIN — NYSTATIN 500000 UNITS: 100000 SUSPENSION ORAL at 18:00

## 2020-05-24 RX ADMIN — SERTRALINE HYDROCHLORIDE 25 MG: 50 TABLET ORAL at 09:43

## 2020-05-24 RX ADMIN — HYDROMORPHONE HYDROCHLORIDE 1 MG: 1 INJECTION, SOLUTION INTRAMUSCULAR; INTRAVENOUS; SUBCUTANEOUS at 05:25

## 2020-05-24 RX ADMIN — HYDROMORPHONE HYDROCHLORIDE 1 MG: 1 INJECTION, SOLUTION INTRAMUSCULAR; INTRAVENOUS; SUBCUTANEOUS at 18:00

## 2020-05-24 RX ADMIN — FAMOTIDINE 20 MG: 10 INJECTION, SOLUTION INTRAVENOUS at 20:51

## 2020-05-24 RX ADMIN — SODIUM CHLORIDE, SODIUM LACTATE, POTASSIUM CHLORIDE, AND CALCIUM CHLORIDE 125 ML/HR: 600; 310; 30; 20 INJECTION, SOLUTION INTRAVENOUS at 22:06

## 2020-05-24 RX ADMIN — NYSTATIN: 100000 CREAM TOPICAL at 16:12

## 2020-05-24 RX ADMIN — Medication 1 CAPSULE: at 14:45

## 2020-05-24 RX ADMIN — PIPERACILLIN AND TAZOBACTAM 3.38 G: 3; .375 INJECTION, POWDER, FOR SOLUTION INTRAVENOUS at 14:45

## 2020-05-24 RX ADMIN — FAMOTIDINE 20 MG: 10 INJECTION, SOLUTION INTRAVENOUS at 09:43

## 2020-05-24 NOTE — PROGRESS NOTES
Bedside and Verbal shift change report given to Dylon Erickson (oncoming nurse) by Kelechi Kaiser RN (offgoing nurse). Report included the following information SBAR and Kardex.

## 2020-05-24 NOTE — PROGRESS NOTES
Progress Note    Patient: Kurt Gonzales MRN: [id number]  SSN: [id number]    YOB: 1995  Age: 21 y.o. Sex: female      Admit Date: 2020    1 Day Post-Op    Procedure:  Procedure(s):  ENDOSCOPIC RETROGRADE CHOLANGIOPANCREATOGRAPHY (ERCP), sphincterotomy, balloon stone extraction, stent placement    Subjective:     Patient still with some lower abdominal pain. Complains of some gas pain. Objective:     Visit Vitals  /77   Pulse 77   Temp 98.3 °F (36.8 °C)   Resp 16   SpO2 97%       Temp (24hrs), Av.3 °F (36.8 °C), Min:97.5 °F (36.4 °C), Max:99.1 °F (37.3 °C)      Physical Exam:    Gen- Alert in NAD  Lungs-CTA  H-RRR  Abd- S/minimal lower abdominal tenderness-Improving     Data Review: images and reports reviewed    Lab Review: All lab results for the last 24 hours reviewed. Recent Results (from the past 24 hour(s))   METABOLIC PANEL, COMPREHENSIVE    Collection Time: 20  1:56 AM   Result Value Ref Range    Sodium 134 (L) 136 - 145 mmol/L    Potassium 3.9 3.5 - 5.1 mmol/L    Chloride 104 97 - 108 mmol/L    CO2 21 21 - 32 mmol/L    Anion gap 9 5 - 15 mmol/L    Glucose 93 65 - 100 mg/dL    BUN 8 6 - 20 MG/DL    Creatinine 0.59 0.55 - 1.02 MG/DL    BUN/Creatinine ratio 14 12 - 20      GFR est AA >60 >60 ml/min/1.73m2    GFR est non-AA >60 >60 ml/min/1.73m2    Calcium 9.2 8.5 - 10.1 MG/DL    Bilirubin, total 0.9 0.2 - 1.0 MG/DL    ALT (SGPT) 298 (H) 12 - 78 U/L    AST (SGOT) 143 (H) 15 - 37 U/L    Alk.  phosphatase 261 (H) 45 - 117 U/L    Protein, total 7.2 6.4 - 8.2 g/dL    Albumin 3.2 (L) 3.5 - 5.0 g/dL    Globulin 4.0 2.0 - 4.0 g/dL    A-G Ratio 0.8 (L) 1.1 - 2.2     MAGNESIUM    Collection Time: 20  1:56 AM   Result Value Ref Range    Magnesium 2.0 1.6 - 2.4 mg/dL   PHOSPHORUS    Collection Time: 20  1:56 AM   Result Value Ref Range    Phosphorus 3.9 2.6 - 4.7 MG/DL   CBC WITH AUTOMATED DIFF    Collection Time: 20  1:56 AM   Result Value Ref Range    WBC 13.8 (H) 3.6 - 11.0 K/uL    RBC 4.09 3.80 - 5.20 M/uL    HGB 11.2 (L) 11.5 - 16.0 g/dL    HCT 34.9 (L) 35.0 - 47.0 %    MCV 85.3 80.0 - 99.0 FL    MCH 27.4 26.0 - 34.0 PG    MCHC 32.1 30.0 - 36.5 g/dL    RDW 12.7 11.5 - 14.5 %    PLATELET 396 706 - 544 K/uL    MPV 11.0 8.9 - 12.9 FL    NRBC 0.0 0  WBC    ABSOLUTE NRBC 0.00 0.00 - 0.01 K/uL    NEUTROPHILS 80 (H) 32 - 75 %    LYMPHOCYTES 13 12 - 49 %    MONOCYTES 6 5 - 13 %    EOSINOPHILS 0 0 - 7 %    BASOPHILS 1 0 - 1 %    IMMATURE GRANULOCYTES 0 0.0 - 0.5 %    ABS. NEUTROPHILS 11.2 (H) 1.8 - 8.0 K/UL    ABS. LYMPHOCYTES 1.8 0.8 - 3.5 K/UL    ABS. MONOCYTES 0.8 0.0 - 1.0 K/UL    ABS. EOSINOPHILS 0.0 0.0 - 0.4 K/UL    ABS. BASOPHILS 0.1 0.0 - 0.1 K/UL    ABS. IMM. GRANS. 0.1 (H) 0.00 - 0.04 K/UL    DF AUTOMATED         Assessment:     Hospital Problems  Date Reviewed: 5/15/2020          Codes Class Noted POA    Bile leak, postoperative ICD-10-CM: K91.89, K83.8  ICD-9-CM: 997.49, 576.8  5/23/2020 Unknown              Plan/Recommendations/Medical Decision Making:   Much improved from yesterday  [address] for GI lite diet   Continue Zosyn  OOB and ambulate   Will continue to monitor but hopefully will not need drainage procedure.

## 2020-05-24 NOTE — PROGRESS NOTES
Gastroenterology Daily Progress Note (Dr. Karen Jones)   [address] [place] [address] Dr Seaman Date: 5/23/2020       Subjective:       Patient had some mild pain in back this am but her lower abd pain is much better. Slept ok. No fevers or chills. Had some clears last night. \"Feel gassy\"  Current Facility-Administered Medications   Medication Dose Route Frequency    sodium chloride (NS) flush 5-40 mL  5-40 mL IntraVENous Q8H    sodium chloride (NS) flush 5-40 mL  5-40 mL IntraVENous PRN    lactated Ringers infusion  125 mL/hr IntraVENous CONTINUOUS    acetaminophen (TYLENOL) tablet 650 mg  650 mg Oral Q6H PRN    HYDROmorphone (PF) (DILAUDID) injection 0.5-1 mg  0.5-1 mg IntraVENous Q3H PRN    naloxone (NARCAN) injection 0.4 mg  0.4 mg IntraVENous PRN    ondansetron (ZOFRAN) injection 4 mg  4 mg IntraVENous Q4H PRN    diphenhydrAMINE (BENADRYL) injection 12.5 mg  12.5 mg IntraVENous Q6H PRN    piperacillin-tazobactam (ZOSYN) 3.375 g in 0.9% sodium chloride (MBP/ADV) 100 mL  3.375 g IntraVENous Q8H    famotidine (PF) (PEPCID) 20 mg in 0.9% sodium chloride 10 mL injection  20 mg IntraVENous Q12H    sertraline (ZOLOFT) tablet 25 mg  25 mg Oral DAILY    sodium chloride (NS) flush 5-40 mL  5-40 mL IntraVENous Q8H    sodium chloride (NS) flush 5-40 mL  5-40 mL IntraVENous PRN    simethicone (MYLICON) 25ZW/3.5MV oral drops 80 mg  1.2 mL Oral Multiple        Objective:     Visit Vitals  /76   Pulse 84   Temp 98.3 °F (36.8 °C)   Resp 16   SpO2 97%   Blood pressure 116/76, pulse 84, temperature 98.3 °F (36.8 °C), resp. rate 16, SpO2 97 %, currently breastfeeding. No intake/output data recorded.     05/22 1901 - 05/24 0700  In: 0   Out: 800 [Urine:800]      Intake/Output Summary (Last 24 hours) at 5/24/2020 0857  Last data filed at 5/24/2020 0618  Gross per 24 hour   Intake 0 ml   Output 800 ml   Net -800 ml     Physical Exam:     General: awake and alert WF in NAD  Chest:  CTA, No rhonchi, rales or rubs. Heart: S1, S2, RRR  GI: Soft, nontender to palpation, ND + BS    Labs:     Recent Results (from the past 24 hour(s))   METABOLIC PANEL, COMPREHENSIVE    Collection Time: 05/24/20  1:56 AM   Result Value Ref Range    Sodium 134 (L) 136 - 145 mmol/L    Potassium 3.9 3.5 - 5.1 mmol/L    Chloride 104 97 - 108 mmol/L    CO2 21 21 - 32 mmol/L    Anion gap 9 5 - 15 mmol/L    Glucose 93 65 - 100 mg/dL    BUN 8 6 - 20 MG/DL    Creatinine 0.59 0.55 - 1.02 MG/DL    BUN/Creatinine ratio 14 12 - 20      GFR est AA >60 >60 ml/min/1.73m2    GFR est non-AA >60 >60 ml/min/1.73m2    Calcium 9.2 8.5 - 10.1 MG/DL    Bilirubin, total 0.9 0.2 - 1.0 MG/DL    ALT (SGPT) 298 (H) 12 - 78 U/L    AST (SGOT) 143 (H) 15 - 37 U/L    Alk. phosphatase 261 (H) 45 - 117 U/L    Protein, total 7.2 6.4 - 8.2 g/dL    Albumin 3.2 (L) 3.5 - 5.0 g/dL    Globulin 4.0 2.0 - 4.0 g/dL    A-G Ratio 0.8 (L) 1.1 - 2.2     MAGNESIUM    Collection Time: 05/24/20  1:56 AM   Result Value Ref Range    Magnesium 2.0 1.6 - 2.4 mg/dL   PHOSPHORUS    Collection Time: 05/24/20  1:56 AM   Result Value Ref Range    Phosphorus 3.9 2.6 - 4.7 MG/DL   CBC WITH AUTOMATED DIFF    Collection Time: 05/24/20  1:56 AM   Result Value Ref Range    WBC 13.8 (H) 3.6 - 11.0 K/uL    RBC 4.09 3.80 - 5.20 M/uL    HGB 11.2 (L) 11.5 - 16.0 g/dL    HCT 34.9 (L) 35.0 - 47.0 %    MCV 85.3 80.0 - 99.0 FL    MCH 27.4 26.0 - 34.0 PG    MCHC 32.1 30.0 - 36.5 g/dL    RDW 12.7 11.5 - 14.5 %    PLATELET 967 747 - 022 K/uL    MPV 11.0 8.9 - 12.9 FL    NRBC 0.0 0  WBC    ABSOLUTE NRBC 0.00 0.00 - 0.01 K/uL    NEUTROPHILS 80 (H) 32 - 75 %    LYMPHOCYTES 13 12 - 49 %    MONOCYTES 6 5 - 13 %    EOSINOPHILS 0 0 - 7 %    BASOPHILS 1 0 - 1 %    IMMATURE GRANULOCYTES 0 0.0 - 0.5 %    ABS. NEUTROPHILS 11.2 (H) 1.8 - 8.0 K/UL    ABS. LYMPHOCYTES 1.8 0.8 - 3.5 K/UL    ABS. MONOCYTES 0.8 0.0 - 1.0 K/UL    ABS. EOSINOPHILS 0.0 0.0 - 0.4 K/UL    ABS. BASOPHILS 0.1 0.0 - 0.1 K/UL    ABS. IMM. GRANS. 0.1 (H) 0.00 - 0.04 K/UL    DF AUTOMATED     LABRCNT(wbc:2,hgb:2,hct:2,plt:2,)  Recent Labs     05/24/20  0156   *   K 3.9      CO2 21   BUN 8   CREA 0.59   GLU 93   CA 9.2   MG 2.0   PHOS 3.9     Recent Labs     05/24/20  0156   SGOT 143*   *   TP 7.2   ALB 3.2*   GLOB 4.0       Lab Results   Component Value Date/Time    ALT (SGPT) 298 (H) 05/24/2020 01:56 AM    AST (SGOT) 143 (H) 05/24/2020 01:56 AM    Alk. phosphatase 261 (H) 05/24/2020 01:56 AM    Bilirubin, total 0.9 05/24/2020 01:56 AM                 5/23/20:  Bilirubin    2.4   AST         376 (174)  ALT          487 ( 61)  Alk phos   354 ( 155)    Impression:  Post op bile leak s/p ERCP with sphincterotomy and stent placement  Choledocholithiasis s/p Stone removal during ERCP  LFT elevation     Plan:  Patient's T bili now normal and AST, ALT and ALP all down today compared to values in [facility] ER yesterday. No fever and her pain overall is improving.   WBC elevation may be due to bile peritonitis but her pain is improving and hopefully will not need a drain placed.  -advance to GI lite diet  -continue management of bile peritonitis (on IV Zosyn)  -will need stent removal with ERCP in 4 weeks  -surgery following       Coleen Hayes MD    5/24/2020 [address] [facility], [address] [place] [address]  [phone] [address] [place]: [phone]

## 2020-05-25 ENCOUNTER — APPOINTMENT (OUTPATIENT)
Dept: CT IMAGING | Age: 25
DRG: 395 | End: 2020-05-25
Attending: SURGERY
Payer: OTHER GOVERNMENT

## 2020-05-25 LAB
ALBUMIN SERPL-MCNC: 2.9 G/DL (ref 3.5–5)
ALBUMIN/GLOB SERPL: 0.8 {RATIO} (ref 1.1–2.2)
ALP SERPL-CCNC: 200 U/L (ref 45–117)
ALT SERPL-CCNC: 176 U/L (ref 12–78)
ANION GAP SERPL CALC-SCNC: 8 MMOL/L (ref 5–15)
AST SERPL-CCNC: 50 U/L (ref 15–37)
BASOPHILS # BLD: 0.1 K/UL (ref 0–0.1)
BASOPHILS NFR BLD: 1 % (ref 0–1)
BILIRUB SERPL-MCNC: 0.6 MG/DL (ref 0.2–1)
BUN SERPL-MCNC: 6 MG/DL (ref 6–20)
BUN/CREAT SERPL: 11 (ref 12–20)
CALCIUM SERPL-MCNC: 8.8 MG/DL (ref 8.5–10.1)
CHLORIDE SERPL-SCNC: 106 MMOL/L (ref 97–108)
CO2 SERPL-SCNC: 25 MMOL/L (ref 21–32)
CREAT SERPL-MCNC: 0.56 MG/DL (ref 0.55–1.02)
DIFFERENTIAL METHOD BLD: ABNORMAL
EOSINOPHIL # BLD: 0.4 K/UL (ref 0–0.4)
EOSINOPHIL NFR BLD: 4 % (ref 0–7)
ERYTHROCYTE [DISTWIDTH] IN BLOOD BY AUTOMATED COUNT: 12.6 % (ref 11.5–14.5)
GLOBULIN SER CALC-MCNC: 3.6 G/DL (ref 2–4)
GLUCOSE SERPL-MCNC: 90 MG/DL (ref 65–100)
HCT VFR BLD AUTO: 32.5 % (ref 35–47)
HGB BLD-MCNC: 10.5 G/DL (ref 11.5–16)
IMM GRANULOCYTES # BLD AUTO: 0 K/UL (ref 0–0.04)
IMM GRANULOCYTES NFR BLD AUTO: 0 % (ref 0–0.5)
LYMPHOCYTES # BLD: 2 K/UL (ref 0.8–3.5)
LYMPHOCYTES NFR BLD: 18 % (ref 12–49)
MAGNESIUM SERPL-MCNC: 1.8 MG/DL (ref 1.6–2.4)
MCH RBC QN AUTO: 27.6 PG (ref 26–34)
MCHC RBC AUTO-ENTMCNC: 32.3 G/DL (ref 30–36.5)
MCV RBC AUTO: 85.3 FL (ref 80–99)
MONOCYTES # BLD: 0.8 K/UL (ref 0–1)
MONOCYTES NFR BLD: 7 % (ref 5–13)
NEUTS SEG # BLD: 7.8 K/UL (ref 1.8–8)
NEUTS SEG NFR BLD: 70 % (ref 32–75)
NRBC # BLD: 0 K/UL (ref 0–0.01)
NRBC BLD-RTO: 0 PER 100 WBC
PHOSPHATE SERPL-MCNC: 3.3 MG/DL (ref 2.6–4.7)
PLATELET # BLD AUTO: 315 K/UL (ref 150–400)
PMV BLD AUTO: 10.8 FL (ref 8.9–12.9)
POTASSIUM SERPL-SCNC: 3.4 MMOL/L (ref 3.5–5.1)
PROT SERPL-MCNC: 6.5 G/DL (ref 6.4–8.2)
RBC # BLD AUTO: 3.81 M/UL (ref 3.8–5.2)
SODIUM SERPL-SCNC: 139 MMOL/L (ref 136–145)
WBC # BLD AUTO: 11.2 K/UL (ref 3.6–11)

## 2020-05-25 PROCEDURE — 74177 CT ABD & PELVIS W/CONTRAST: CPT

## 2020-05-25 PROCEDURE — 74011250637 HC RX REV CODE- 250/637: Performed by: SURGERY

## 2020-05-25 PROCEDURE — 74011000258 HC RX REV CODE- 258: Performed by: RADIOLOGY

## 2020-05-25 PROCEDURE — 74011000258 HC RX REV CODE- 258: Performed by: SURGERY

## 2020-05-25 PROCEDURE — 80053 COMPREHEN METABOLIC PANEL: CPT

## 2020-05-25 PROCEDURE — 85025 COMPLETE CBC W/AUTO DIFF WBC: CPT

## 2020-05-25 PROCEDURE — 74011250636 HC RX REV CODE- 250/636: Performed by: SURGERY

## 2020-05-25 PROCEDURE — 74011636320 HC RX REV CODE- 636/320: Performed by: RADIOLOGY

## 2020-05-25 PROCEDURE — 36415 COLL VENOUS BLD VENIPUNCTURE: CPT

## 2020-05-25 PROCEDURE — 65270000032 HC RM SEMIPRIVATE

## 2020-05-25 PROCEDURE — 84100 ASSAY OF PHOSPHORUS: CPT

## 2020-05-25 PROCEDURE — 74011000250 HC RX REV CODE- 250: Performed by: SURGERY

## 2020-05-25 PROCEDURE — 83735 ASSAY OF MAGNESIUM: CPT

## 2020-05-25 RX ORDER — OXYCODONE HYDROCHLORIDE 5 MG/1
5 TABLET ORAL
Status: DISCONTINUED | OUTPATIENT
Start: 2020-05-25 | End: 2020-05-26 | Stop reason: HOSPADM

## 2020-05-25 RX ORDER — FAMOTIDINE 20 MG/1
20 TABLET, FILM COATED ORAL DAILY
Status: DISCONTINUED | OUTPATIENT
Start: 2020-05-26 | End: 2020-05-26 | Stop reason: HOSPADM

## 2020-05-25 RX ORDER — OXYCODONE HYDROCHLORIDE 5 MG/1
10 TABLET ORAL
Status: DISCONTINUED | OUTPATIENT
Start: 2020-05-25 | End: 2020-05-26 | Stop reason: HOSPADM

## 2020-05-25 RX ORDER — SODIUM CHLORIDE 0.9 % (FLUSH) 0.9 %
10 SYRINGE (ML) INJECTION
Status: DISPENSED | OUTPATIENT
Start: 2020-05-25 | End: 2020-05-26

## 2020-05-25 RX ORDER — SODIUM CHLORIDE 0.9 % (FLUSH) 0.9 %
10 SYRINGE (ML) INJECTION
Status: COMPLETED | OUTPATIENT
Start: 2020-05-25 | End: 2020-05-25

## 2020-05-25 RX ADMIN — Medication 1 CAPSULE: at 09:35

## 2020-05-25 RX ADMIN — NYSTATIN: 100000 CREAM TOPICAL at 11:03

## 2020-05-25 RX ADMIN — IOHEXOL 50 ML: 240 INJECTION, SOLUTION INTRATHECAL; INTRAVASCULAR; INTRAVENOUS; ORAL at 18:00

## 2020-05-25 RX ADMIN — NYSTATIN 500000 UNITS: 100000 SUSPENSION ORAL at 17:30

## 2020-05-25 RX ADMIN — FAMOTIDINE 20 MG: 10 INJECTION, SOLUTION INTRAVENOUS at 09:37

## 2020-05-25 RX ADMIN — SODIUM CHLORIDE, SODIUM LACTATE, POTASSIUM CHLORIDE, AND CALCIUM CHLORIDE 75 ML/HR: 600; 310; 30; 20 INJECTION, SOLUTION INTRAVENOUS at 13:54

## 2020-05-25 RX ADMIN — ACETAMINOPHEN 650 MG: 325 TABLET, FILM COATED ORAL at 20:39

## 2020-05-25 RX ADMIN — SODIUM CHLORIDE, SODIUM LACTATE, POTASSIUM CHLORIDE, AND CALCIUM CHLORIDE 125 ML/HR: 600; 310; 30; 20 INJECTION, SOLUTION INTRAVENOUS at 05:59

## 2020-05-25 RX ADMIN — IOPAMIDOL 100 ML: 755 INJECTION, SOLUTION INTRAVENOUS at 20:00

## 2020-05-25 RX ADMIN — NYSTATIN 500000 UNITS: 100000 SUSPENSION ORAL at 09:37

## 2020-05-25 RX ADMIN — ACETAMINOPHEN 650 MG: 325 TABLET, FILM COATED ORAL at 05:59

## 2020-05-25 RX ADMIN — SODIUM CHLORIDE 100 ML: 900 INJECTION, SOLUTION INTRAVENOUS at 20:00

## 2020-05-25 RX ADMIN — HYDROMORPHONE HYDROCHLORIDE 1 MG: 1 INJECTION, SOLUTION INTRAMUSCULAR; INTRAVENOUS; SUBCUTANEOUS at 10:54

## 2020-05-25 RX ADMIN — PIPERACILLIN AND TAZOBACTAM 3.38 G: 3; .375 INJECTION, POWDER, FOR SOLUTION INTRAVENOUS at 07:52

## 2020-05-25 RX ADMIN — NYSTATIN 500000 UNITS: 100000 SUSPENSION ORAL at 22:05

## 2020-05-25 RX ADMIN — NYSTATIN 500000 UNITS: 100000 SUSPENSION ORAL at 13:53

## 2020-05-25 RX ADMIN — ONDANSETRON 4 MG: 2 INJECTION INTRAMUSCULAR; INTRAVENOUS at 11:10

## 2020-05-25 RX ADMIN — SERTRALINE HYDROCHLORIDE 25 MG: 50 TABLET ORAL at 09:35

## 2020-05-25 RX ADMIN — PIPERACILLIN AND TAZOBACTAM 3.38 G: 3; .375 INJECTION, POWDER, FOR SOLUTION INTRAVENOUS at 16:20

## 2020-05-25 RX ADMIN — ACETAMINOPHEN 650 MG: 325 TABLET, FILM COATED ORAL at 13:53

## 2020-05-25 RX ADMIN — Medication 10 ML: at 20:00

## 2020-05-25 RX ADMIN — PIPERACILLIN AND TAZOBACTAM 3.38 G: 3; .375 INJECTION, POWDER, FOR SOLUTION INTRAVENOUS at 22:05

## 2020-05-25 RX ADMIN — NYSTATIN: 100000 CREAM TOPICAL at 17:30

## 2020-05-25 NOTE — PROGRESS NOTES
Patient was complaining of some shoulder plain earlier, although fairly benign exam, patient's  arrived to floor, patient and has been requesting repeat CT scan to assess the shoulder pain, understanding that this is likely from some bile irritation of the diaphragm but given the request will order CT scan, original CT scan was done at [facility] and [facility] doctors, will see if there is any significant change or development of abscess, patient and family aware of radiation exposure and will also add oral pain medication  Patient also had questions about Dilaudid and narcotic pain medication and use of breastmilk and will defer to pharmacy or patient's  Obstetrician

## 2020-05-25 NOTE — PHYSICIAN ADVISORY
Letter of Status Determination:   Recommend hospitalization status upgraded from   INPATIENT  to INPATIENT  Status     Pt Name:  Stacey Stewart   MR#   [address] [facility] [facility] # [id number] /  [id number]  Payor: Catherine Prieto / Plan: Drea Miller / Product Type: Fredis Ramirez /    ASIF#  [id number]   Room and Hospital  525/02  @ [facility] hospital   Hospitalization date  5/23/2020  1:33 PM   Current Attending Physician  Mikel Live MD   Principal diagnosis  Bile leak, postoperative [K91.89, K83.8]     Clinicals  21 y.o. y.o  female hospitalized with above diagnosis   Patient's T bili now normal and AST, ALT and ALP all down today compared to values in [facility] ER yesterday. No fever and her pain overall is improving. WBC elevation may be due to bile peritonitis but her pain is improving and hopefully will not need a drain placed.  -advance to GI lite diet  -continue management of bile peritonitis (on IV Zosyn)  -will need stent removal with ERCP in 4 weeks  Much improved from yesterday  [facility]iza List for GI lite diet   Continue Zosyn  OOB and ambulate   Will continue to monitor but hopefully will not need drainage procedure. [place] (MCG) criteria   Does  NOT apply    STATUS DETERMINATION      This patient is at high risk of adverse events and deterioration based on documented clinical data, comorbid conditions and current acute care course. Ms. Stacey Stewart is expected to meet Inpatient Admission status criteria in accordance with CMS regulation Section 43 .3. Specifically, due to medical necessity the patient's stay is expected to exceed Two Midnights. It is our recommendation that this patient's hospitalization status should be upgraded from  INPATIENT to INPATIENT status. The final decision of the patient's hospitalization status depends on the attending physician's judgment.     Additional comments     Payor: LOIS / Plan: Drea Miller / Product Type: Fredis Byandres /           Azalee Grade Mini [address] Back Dr. Rachana Desai, [facility]  T: [phone]    [email or web address]

## 2020-05-25 NOTE — PROGRESS NOTES
Gastroenterology Daily Progress Note (Dr. Lion Marrero)   [facility]    Admit Date: 5/23/2020       Subjective:       Patient resting comfortably on AM rounds. She says \"feels like trapped gas\" but not a pain in her abdomen. She has asked for tylenol but not required dilaudid. No fevers reported. Current Facility-Administered Medications   Medication Dose Route Frequency    lactobac ac& pc-s.therm-b.anim (JUANITA Q/RISAQUAD)  1 Cap Oral DAILY    nystatin (MYCOSTATIN) 100,000 unit/gram cream   Topical BID    nystatin (MYCOSTATIN) 100,000 unit/mL oral suspension 500,000 Units  500,000 Units Oral QID    sodium chloride (NS) flush 5-40 mL  5-40 mL IntraVENous Q8H    sodium chloride (NS) flush 5-40 mL  5-40 mL IntraVENous PRN    lactated Ringers infusion  125 mL/hr IntraVENous CONTINUOUS    acetaminophen (TYLENOL) tablet 650 mg  650 mg Oral Q6H PRN    HYDROmorphone (PF) (DILAUDID) injection 0.5-1 mg  0.5-1 mg IntraVENous Q3H PRN    naloxone (NARCAN) injection 0.4 mg  0.4 mg IntraVENous PRN    ondansetron (ZOFRAN) injection 4 mg  4 mg IntraVENous Q4H PRN    diphenhydrAMINE (BENADRYL) injection 12.5 mg  12.5 mg IntraVENous Q6H PRN    piperacillin-tazobactam (ZOSYN) 3.375 g in 0.9% sodium chloride (MBP/ADV) 100 mL  3.375 g IntraVENous Q8H    famotidine (PF) (PEPCID) 20 mg in 0.9% sodium chloride 10 mL injection  20 mg IntraVENous Q12H    sertraline (ZOLOFT) tablet 25 mg  25 mg Oral DAILY    sodium chloride (NS) flush 5-40 mL  5-40 mL IntraVENous Q8H    sodium chloride (NS) flush 5-40 mL  5-40 mL IntraVENous PRN    simethicone (MYLICON) 37BP/3.5CG oral drops 80 mg  1.2 mL Oral Multiple        Objective:     Visit Vitals  /80   Pulse 90   Temp 99.3 °F (37.4 °C)   Resp 18   SpO2 98%   Blood pressure 135/80, pulse 90, temperature 99.3 °F (37.4 °C), resp. rate 18, SpO2 98 %, currently breastfeeding. No intake/output data recorded.     05/23 1901 - 05/25 0700  In: 1240 [P.O.:1240]  Out: 1550 [Urine:1550]      Intake/Output Summary (Last 24 hours) at 5/25/2020 0751  Last data filed at 5/24/2020 1539  Gross per 24 hour   Intake 1240 ml   Output 750 ml   Net 490 ml     Physical Exam:     General: awake and alert WF in NAD  Chest:  CTA, No rhonchi, rales or rubs. Heart: S1, S2, RRR  GI: Soft, mild tenderness in epigastrim, ND + BS    Labs:     Recent Results (from the past 24 hour(s))   METABOLIC PANEL, COMPREHENSIVE    Collection Time: 05/25/20  3:42 AM   Result Value Ref Range    Sodium 139 136 - 145 mmol/L    Potassium 3.4 (L) 3.5 - 5.1 mmol/L    Chloride 106 97 - 108 mmol/L    CO2 25 21 - 32 mmol/L    Anion gap 8 5 - 15 mmol/L    Glucose 90 65 - 100 mg/dL    BUN 6 6 - 20 MG/DL    Creatinine 0.56 0.55 - 1.02 MG/DL    BUN/Creatinine ratio 11 (L) 12 - 20      GFR est AA >60 >60 ml/min/1.73m2    GFR est non-AA >60 >60 ml/min/1.73m2    Calcium 8.8 8.5 - 10.1 MG/DL    Bilirubin, total 0.6 0.2 - 1.0 MG/DL    ALT (SGPT) 176 (H) 12 - 78 U/L    AST (SGOT) 50 (H) 15 - 37 U/L    Alk.  phosphatase 200 (H) 45 - 117 U/L    Protein, total 6.5 6.4 - 8.2 g/dL    Albumin 2.9 (L) 3.5 - 5.0 g/dL    Globulin 3.6 2.0 - 4.0 g/dL    A-G Ratio 0.8 (L) 1.1 - 2.2     MAGNESIUM    Collection Time: 05/25/20  3:42 AM   Result Value Ref Range    Magnesium 1.8 1.6 - 2.4 mg/dL   PHOSPHORUS    Collection Time: 05/25/20  3:42 AM   Result Value Ref Range    Phosphorus 3.3 2.6 - 4.7 MG/DL   CBC WITH AUTOMATED DIFF    Collection Time: 05/25/20  3:42 AM   Result Value Ref Range    WBC 11.2 (H) 3.6 - 11.0 K/uL    RBC 3.81 3.80 - 5.20 M/uL    HGB 10.5 (L) 11.5 - 16.0 g/dL    HCT 32.5 (L) 35.0 - 47.0 %    MCV 85.3 80.0 - 99.0 FL    MCH 27.6 26.0 - 34.0 PG    MCHC 32.3 30.0 - 36.5 g/dL    RDW 12.6 11.5 - 14.5 %    PLATELET 082 598 - 824 K/uL    MPV 10.8 8.9 - 12.9 FL    NRBC 0.0 0  WBC    ABSOLUTE NRBC 0.00 0.00 - 0.01 K/uL    NEUTROPHILS 70 32 - 75 %    LYMPHOCYTES 18 12 - 49 %    MONOCYTES 7 5 - 13 %    EOSINOPHILS 4 0 - 7 %    BASOPHILS 1 0 - 1 %    IMMATURE GRANULOCYTES 0 0.0 - 0.5 %    ABS. NEUTROPHILS 7.8 1.8 - 8.0 K/UL    ABS. LYMPHOCYTES 2.0 0.8 - 3.5 K/UL    ABS. MONOCYTES 0.8 0.0 - 1.0 K/UL    ABS. EOSINOPHILS 0.4 0.0 - 0.4 K/UL    ABS. BASOPHILS 0.1 0.0 - 0.1 K/UL    ABS. IMM. GRANS. 0.0 0.00 - 0.04 K/UL    DF AUTOMATED     LABRCNT(wbc:2,hgb:2,hct:2,plt:2,)  Recent Labs     05/25/20  0342 05/24/20  0156    134*   K 3.4* 3.9    104   CO2 25 21   BUN 6 8   CREA 0.56 0.59   GLU 90 93   CA 8.8 9.2   MG 1.8 2.0   PHOS 3.3 3.9     Recent Labs     05/25/20  0342 05/24/20  0156   SGOT 50* 143*   * 261*   TP 6.5 7.2   ALB 2.9* 3.2*   GLOB 3.6 4.0       Lab Results   Component Value Date/Time    ALT (SGPT) 176 (H) 05/25/2020 03:42 AM    AST (SGOT) 50 (H) 05/25/2020 03:42 AM    Alk. phosphatase 200 (H) 05/25/2020 03:42 AM    Bilirubin, total 0.6 05/25/2020 03:42 AM                 5/23/20:  Bilirubin    2.4   AST         376 (174)  ALT          487 ( 61)  Alk phos   354 ( 155)    Impression:  Post op bile leak s/p ERCP with sphincterotomy and stent placement  Choledocholithiasis s/p Stone removal during ERCP  LFT elevation     Plan:  Patient's liver enzymes continue to decrease today and her WBC also down to 11 today which are c/w improvement. Mild discomfort likely due to bile peritonitis but is improving.   -GI lite diet  -continue management of bile peritonitis (on IV Zosyn)  -will need stent removal with ERCP in 4 weeks  -surgery following and will determine discharge timing  -Dr. Karissa Fam or partner to resume care tomorrow if she remains inpatient       Tania Figueroa MD    5/25/2020  [address], [address] [place] [address]  [phone] [address] [place]: [phone]

## 2020-05-25 NOTE — PROGRESS NOTES
Admit Date: 2020    POD 2 Days Post-Op    Procedure:  Procedure(s):  ENDOSCOPIC RETROGRADE CHOLANGIOPANCREATOGRAPHY (ERCP), sphincterotomy, balloon stone extraction, stent placement    Subjective:     Patient post ERCP and stent for bile leak, clinically improving, LFTs improving, increasing diet, tolerating, patient had been to the emergency room several times postop, discussed options of discharge today but patient still feeling a little bit sluggish and preferred to be monitored for p.o. intake and pain control until tomorrow, I concur          Review of Systems   Gastrointestinal:        Improving abdominal pain, improving appetite   Neurological: Negative. Psychiatric/Behavioral: Negative. All other systems reviewed and are negative. Objective:     Blood pressure 128/87, pulse 77, temperature 98.4 °F (36.9 °C), resp. rate 18, SpO2 100 %, currently breastfeeding. Temp (24hrs), Av.6 °F (37 °C), Min:98.3 °F (36.8 °C), Max:99.3 °F (37.4 °C)          Physical Exam  Vitals signs and nursing note reviewed. Constitutional:       General: She is not in acute distress. Appearance: Normal appearance. She is not ill-appearing. Eyes:      General: No scleral icterus. Conjunctiva/sclera: Conjunctivae normal.   Cardiovascular:      Rate and Rhythm: Normal rate. Pulmonary:      Effort: Pulmonary effort is normal.   Abdominal:      General: Abdomen is flat. Comments: Soft minimally tender, incisions clean,   Neurological:      General: No focal deficit present. Mental Status: She is alert and oriented to person, place, and time. Psychiatric:         Mood and Affect: Mood normal.         Behavior: Behavior normal.         Thought Content:  Thought content normal.         Judgment: Judgment normal.            Labs:   Recent Results (from the past 24 hour(s))   METABOLIC PANEL, COMPREHENSIVE    Collection Time: 20  3:42 AM   Result Value Ref Range    Sodium 139 136 - 145 mmol/L Potassium 3.4 (L) 3.5 - 5.1 mmol/L    Chloride 106 97 - 108 mmol/L    CO2 25 21 - 32 mmol/L    Anion gap 8 5 - 15 mmol/L    Glucose 90 65 - 100 mg/dL    BUN 6 6 - 20 MG/DL    Creatinine 0.56 0.55 - 1.02 MG/DL    BUN/Creatinine ratio 11 (L) 12 - 20      GFR est AA >60 >60 ml/min/1.73m2    GFR est non-AA >60 >60 ml/min/1.73m2    Calcium 8.8 8.5 - 10.1 MG/DL    Bilirubin, total 0.6 0.2 - 1.0 MG/DL    ALT (SGPT) 176 (H) 12 - 78 U/L    AST (SGOT) 50 (H) 15 - 37 U/L    Alk. phosphatase 200 (H) 45 - 117 U/L    Protein, total 6.5 6.4 - 8.2 g/dL    Albumin 2.9 (L) 3.5 - 5.0 g/dL    Globulin 3.6 2.0 - 4.0 g/dL    A-G Ratio 0.8 (L) 1.1 - 2.2     MAGNESIUM    Collection Time: 05/25/20  3:42 AM   Result Value Ref Range    Magnesium 1.8 1.6 - 2.4 mg/dL   PHOSPHORUS    Collection Time: 05/25/20  3:42 AM   Result Value Ref Range    Phosphorus 3.3 2.6 - 4.7 MG/DL   CBC WITH AUTOMATED DIFF    Collection Time: 05/25/20  3:42 AM   Result Value Ref Range    WBC 11.2 (H) 3.6 - 11.0 K/uL    RBC 3.81 3.80 - 5.20 M/uL    HGB 10.5 (L) 11.5 - 16.0 g/dL    HCT 32.5 (L) 35.0 - 47.0 %    MCV 85.3 80.0 - 99.0 FL    MCH 27.6 26.0 - 34.0 PG    MCHC 32.3 30.0 - 36.5 g/dL    RDW 12.6 11.5 - 14.5 %    PLATELET 785 249 - 309 K/uL    MPV 10.8 8.9 - 12.9 FL    NRBC 0.0 0  WBC    ABSOLUTE NRBC 0.00 0.00 - 0.01 K/uL    NEUTROPHILS 70 32 - 75 %    LYMPHOCYTES 18 12 - 49 %    MONOCYTES 7 5 - 13 %    EOSINOPHILS 4 0 - 7 %    BASOPHILS 1 0 - 1 %    IMMATURE GRANULOCYTES 0 0.0 - 0.5 %    ABS. NEUTROPHILS 7.8 1.8 - 8.0 K/UL    ABS. LYMPHOCYTES 2.0 0.8 - 3.5 K/UL    ABS. MONOCYTES 0.8 0.0 - 1.0 K/UL    ABS. EOSINOPHILS 0.4 0.0 - 0.4 K/UL    ABS. BASOPHILS 0.1 0.0 - 0.1 K/UL    ABS. IMM.  GRANS. 0.0 0.00 - 0.04 K/UL    DF AUTOMATED         Data Review images and reports reviewed    Assessment:     Active Problems:    Bile leak, postoperative (5/23/2020)        Plan/Recommendations/Medical Decision Making:     Continue present treatment   Patient post ERCP and stent for bile leak, clinically improving, LFTs improving, increasing diet, tolerating, patient had been to the emergency room several times postop, discussed options of discharge today but patient still feeling a little bit sluggish and preferred to be monitored for p.o. intake and pain control until tomorrow, I concur   probable discharge tomorrow        Bhavik Brooks MD , [facility]

## 2020-05-25 NOTE — PROGRESS NOTES
Patient complaining of left mid-upper back pain, worse with deep inspiration. Pain alleviated with IV Dilaudid but returns when medication wears off. Patient's  in room and requesting \"another scan\". Patient is in agreement with this. RN paged Dr. Ana Rosa Cortez to notify of above. MD stated he will place orders. Patient also inquiring how long after received IV Dilaudid is her breast-milk safe to feed her infant. RN asked MD, who instructed to contact Pharmacy or OB. RN paged Pharmacy, who stated that Dilaudid remains in system x 5-15hrs. RN notified patient of this.

## 2020-05-26 VITALS
TEMPERATURE: 98.2 F | SYSTOLIC BLOOD PRESSURE: 117 MMHG | DIASTOLIC BLOOD PRESSURE: 74 MMHG | HEART RATE: 61 BPM | RESPIRATION RATE: 18 BRPM | OXYGEN SATURATION: 97 %

## 2020-05-26 DIAGNOSIS — G89.18 POSTOPERATIVE PAIN: Primary | ICD-10-CM

## 2020-05-26 LAB
ALBUMIN SERPL-MCNC: 2.8 G/DL (ref 3.5–5)
ALBUMIN/GLOB SERPL: 0.8 {RATIO} (ref 1.1–2.2)
ALP SERPL-CCNC: 162 U/L (ref 45–117)
ALT SERPL-CCNC: 125 U/L (ref 12–78)
ANION GAP SERPL CALC-SCNC: 8 MMOL/L (ref 5–15)
AST SERPL-CCNC: 28 U/L (ref 15–37)
BASOPHILS # BLD: 0.1 K/UL (ref 0–0.1)
BASOPHILS NFR BLD: 1 % (ref 0–1)
BILIRUB SERPL-MCNC: 0.5 MG/DL (ref 0.2–1)
BUN SERPL-MCNC: 4 MG/DL (ref 6–20)
BUN/CREAT SERPL: 6 (ref 12–20)
CALCIUM SERPL-MCNC: 8.8 MG/DL (ref 8.5–10.1)
CHLORIDE SERPL-SCNC: 105 MMOL/L (ref 97–108)
CO2 SERPL-SCNC: 27 MMOL/L (ref 21–32)
CREAT SERPL-MCNC: 0.62 MG/DL (ref 0.55–1.02)
DIFFERENTIAL METHOD BLD: ABNORMAL
EOSINOPHIL # BLD: 0.5 K/UL (ref 0–0.4)
EOSINOPHIL NFR BLD: 6 % (ref 0–7)
ERYTHROCYTE [DISTWIDTH] IN BLOOD BY AUTOMATED COUNT: 12.5 % (ref 11.5–14.5)
GLOBULIN SER CALC-MCNC: 3.7 G/DL (ref 2–4)
GLUCOSE SERPL-MCNC: 104 MG/DL (ref 65–100)
HCT VFR BLD AUTO: 31 % (ref 35–47)
HGB BLD-MCNC: 10.1 G/DL (ref 11.5–16)
IMM GRANULOCYTES # BLD AUTO: 0 K/UL (ref 0–0.04)
IMM GRANULOCYTES NFR BLD AUTO: 0 % (ref 0–0.5)
LYMPHOCYTES # BLD: 2.1 K/UL (ref 0.8–3.5)
LYMPHOCYTES NFR BLD: 22 % (ref 12–49)
MAGNESIUM SERPL-MCNC: 1.8 MG/DL (ref 1.6–2.4)
MCH RBC QN AUTO: 27.7 PG (ref 26–34)
MCHC RBC AUTO-ENTMCNC: 32.6 G/DL (ref 30–36.5)
MCV RBC AUTO: 85.2 FL (ref 80–99)
MONOCYTES # BLD: 0.8 K/UL (ref 0–1)
MONOCYTES NFR BLD: 8 % (ref 5–13)
NEUTS SEG # BLD: 6.2 K/UL (ref 1.8–8)
NEUTS SEG NFR BLD: 63 % (ref 32–75)
NRBC # BLD: 0 K/UL (ref 0–0.01)
NRBC BLD-RTO: 0 PER 100 WBC
PHOSPHATE SERPL-MCNC: 4.7 MG/DL (ref 2.6–4.7)
PLATELET # BLD AUTO: 320 K/UL (ref 150–400)
PMV BLD AUTO: 11 FL (ref 8.9–12.9)
POTASSIUM SERPL-SCNC: 3.3 MMOL/L (ref 3.5–5.1)
PROT SERPL-MCNC: 6.5 G/DL (ref 6.4–8.2)
RBC # BLD AUTO: 3.64 M/UL (ref 3.8–5.2)
SODIUM SERPL-SCNC: 140 MMOL/L (ref 136–145)
WBC # BLD AUTO: 9.7 K/UL (ref 3.6–11)

## 2020-05-26 PROCEDURE — 74011250637 HC RX REV CODE- 250/637: Performed by: SURGERY

## 2020-05-26 PROCEDURE — 84100 ASSAY OF PHOSPHORUS: CPT

## 2020-05-26 PROCEDURE — 80053 COMPREHEN METABOLIC PANEL: CPT

## 2020-05-26 PROCEDURE — 36415 COLL VENOUS BLD VENIPUNCTURE: CPT

## 2020-05-26 PROCEDURE — 74011250636 HC RX REV CODE- 250/636: Performed by: SURGERY

## 2020-05-26 PROCEDURE — 83735 ASSAY OF MAGNESIUM: CPT

## 2020-05-26 PROCEDURE — 74011000258 HC RX REV CODE- 258: Performed by: SURGERY

## 2020-05-26 PROCEDURE — 85025 COMPLETE CBC W/AUTO DIFF WBC: CPT

## 2020-05-26 RX ORDER — HYDROCODONE BITARTRATE AND ACETAMINOPHEN 5; 325 MG/1; MG/1
1 TABLET ORAL
Qty: 20 TAB | Refills: 0 | Status: SHIPPED | OUTPATIENT
Start: 2020-05-26 | End: 2020-05-31

## 2020-05-26 RX ADMIN — ACETAMINOPHEN 650 MG: 325 TABLET, FILM COATED ORAL at 05:50

## 2020-05-26 RX ADMIN — Medication 10 ML: at 06:31

## 2020-05-26 RX ADMIN — FAMOTIDINE 20 MG: 20 TABLET ORAL at 08:44

## 2020-05-26 RX ADMIN — Medication 1 CAPSULE: at 08:44

## 2020-05-26 RX ADMIN — NYSTATIN: 100000 CREAM TOPICAL at 08:45

## 2020-05-26 RX ADMIN — SERTRALINE HYDROCHLORIDE 25 MG: 50 TABLET ORAL at 08:45

## 2020-05-26 RX ADMIN — NYSTATIN 500000 UNITS: 100000 SUSPENSION ORAL at 08:45

## 2020-05-26 RX ADMIN — PIPERACILLIN AND TAZOBACTAM 3.38 G: 3; .375 INJECTION, POWDER, FOR SOLUTION INTRAVENOUS at 06:31

## 2020-05-26 NOTE — PROGRESS NOTES
[address]  [address], [address]  [phone]                GI PROGRESS NOTE      NAME:   Radha Tolentino   :    1995   MRN:    [id number]     Assessment/Plan   Post op bile leak s/p ERCP with sphincterotomy and stent placement  Choledocholithiasis s/p Stone removal during ERCP, s/p lap cholecystectomy May 15, 2020  -abdominal pain resolved  -LFT's trending down, , AST 28, Alk phos 162  -tolerating GI lite diet  -follow up with GI in 4 weeks for stent removal, provided patient with office information, advised to call if any questions  -able to be discharged to home today for GI standpoint    Discussed with Dr. Silvia Marrero     Patient Active Problem List   Diagnosis Code    Anxiety F41.9    31 weeks gestation of pregnancy Z3A.31    Acute midline thoracic back pain M54.6    Anemia affecting pregnancy in third trimester O99.013    Gallstone pancreatitis K85.10    Gallstones K80.20    Bile leak, postoperative K91.89, K83.8       Subjective: Radha Tolentino is a 21 y.o.  femalePatient stated feeling much better. No nausea, no vomiting, no chest pain, no abdominal pain. Tolerating diet, bowel movement formed-denies blood in stool. Review of Systems    Constitutional: negative fever, negative chills, negative weight loss  Eyes:   negative visual changes  ENT:   negative sore throat, tongue or lip swelling  Respiratory:  negative cough, negative dyspnea  Cards:  negative for chest pain, palpitations, lower extremity edema  GI:   See HPI  :  negative for frequency, dysuria  Integument:  negative for rash and pruritus  Heme:  negative for easy bruising and gum/nose bleeding  Musculoskel: negative for myalgias,  back pain and muscle weakness  Neuro: negative for headaches, dizziness, vertigo  Psych:  negative for feelings of anxiety, depression           Objective:     VITALS:   Last 24hrs VS reviewed since prior hospitalist progress note.  Most recent are:  Visit Vitals  /74 (BP 1 Location: Right arm, BP Patient Position: At rest)   Pulse 61   Temp 98.2 °F (36.8 °C)   Resp 18   SpO2 97%       Intake/Output Summary (Last 24 hours) at 5/26/2020 1042  Last data filed at 5/26/2020 0830  Gross per 24 hour   Intake 480 ml   Output --   Net 480 ml        PHYSICAL EXAM:  General   well developed, well nourished, appears stated age, in no acute distress  EENT  Normocephalic, Atraumatic, PERRLA, EOMI, sclera clear,  Respiratory   Clear To Auscultation bilaterally - no wheezes, rales, rhonchi, or crackles  Cardiology  Regular Rate and Rythmn  - no murmurs, rubs or gallops  Abdominal  Soft, very minimal tenderness with deep palpation to right upper quadrant, non-distended, positive bowel sounds  Extremities  No clubbing, cyanosis, or edema. Pulses intact. Skin  Normal skin turgor. No rashes or skin ulcers noted  Neurological  No focal neurological deficits noted  Psychological  Oriented x 3. Normal affect. Lab Data   Recent Results (from the past 12 hour(s))   METABOLIC PANEL, COMPREHENSIVE    Collection Time: 05/26/20  4:37 AM   Result Value Ref Range    Sodium 140 136 - 145 mmol/L    Potassium 3.3 (L) 3.5 - 5.1 mmol/L    Chloride 105 97 - 108 mmol/L    CO2 27 21 - 32 mmol/L    Anion gap 8 5 - 15 mmol/L    Glucose 104 (H) 65 - 100 mg/dL    BUN 4 (L) 6 - 20 MG/DL    Creatinine 0.62 0.55 - 1.02 MG/DL    BUN/Creatinine ratio 6 (L) 12 - 20      GFR est AA >60 >60 ml/min/1.73m2    GFR est non-AA >60 >60 ml/min/1.73m2    Calcium 8.8 8.5 - 10.1 MG/DL    Bilirubin, total 0.5 0.2 - 1.0 MG/DL    ALT (SGPT) 125 (H) 12 - 78 U/L    AST (SGOT) 28 15 - 37 U/L    Alk.  phosphatase 162 (H) 45 - 117 U/L    Protein, total 6.5 6.4 - 8.2 g/dL    Albumin 2.8 (L) 3.5 - 5.0 g/dL    Globulin 3.7 2.0 - 4.0 g/dL    A-G Ratio 0.8 (L) 1.1 - 2.2     MAGNESIUM    Collection Time: 05/26/20  4:37 AM   Result Value Ref Range    Magnesium 1.8 1.6 - 2.4 mg/dL   PHOSPHORUS    Collection Time: 05/26/20  4:37 AM   Result Value Ref Range    Phosphorus 4.7 2.6 - 4.7 MG/DL   CBC WITH AUTOMATED DIFF    Collection Time: 05/26/20  4:37 AM   Result Value Ref Range    WBC 9.7 3.6 - 11.0 K/uL    RBC 3.64 (L) 3.80 - 5.20 M/uL    HGB 10.1 (L) 11.5 - 16.0 g/dL    HCT 31.0 (L) 35.0 - 47.0 %    MCV 85.2 80.0 - 99.0 FL    MCH 27.7 26.0 - 34.0 PG    MCHC 32.6 30.0 - 36.5 g/dL    RDW 12.5 11.5 - 14.5 %    PLATELET 262 447 - 071 K/uL    MPV 11.0 8.9 - 12.9 FL    NRBC 0.0 0  WBC    ABSOLUTE NRBC 0.00 0.00 - 0.01 K/uL    NEUTROPHILS 63 32 - 75 %    LYMPHOCYTES 22 12 - 49 %    MONOCYTES 8 5 - 13 %    EOSINOPHILS 6 0 - 7 %    BASOPHILS 1 0 - 1 %    IMMATURE GRANULOCYTES 0 0.0 - 0.5 %    ABS. NEUTROPHILS 6.2 1.8 - 8.0 K/UL    ABS. LYMPHOCYTES 2.1 0.8 - 3.5 K/UL    ABS. MONOCYTES 0.8 0.0 - 1.0 K/UL    ABS. EOSINOPHILS 0.5 (H) 0.0 - 0.4 K/UL    ABS. BASOPHILS 0.1 0.0 - 0.1 K/UL    ABS. IMM. GRANS. 0.0 0.00 - 0.04 K/UL    DF AUTOMATED           Medications: Reviewed  Date/Time:  5/26/2020    I have reviewed the chart and agree with the documentation recorded by the NP, including the assessment, treatment plan, and disposition.       Amy Yo MD

## 2020-05-26 NOTE — PROGRESS NOTES
Patient requesting a refill on Norco. She does not have any left at home from after her Lap jai. Refill sent to the pharmacy.

## 2020-05-26 NOTE — PROGRESS NOTES
Patient requesting script for Hydrocodone on discharge. RN sent message to Jose Hernandez NP, who agreed to send script electronically. Patient confirmed pharmacy is [facility] on [address]. Written and verbal discharge instructions reviewed with patient. Patient confirmed understanding with adequate teach back. IV removed per protocol. Belongings with patient at time of dsicharge.

## 2020-05-26 NOTE — ROUTINE PROCESS
Bedside and Verbal shift change report given to Zeus Grady (oncoming nurse) by Yvonne Zeng RN (offgoing nurse). Report included the following information SBAR, Kardex, Procedure Summary, Intake/Output, MAR and Recent Results.

## 2020-05-26 NOTE — DISCHARGE SUMMARY
Physician Discharge Summary     Patient ID:  Marlen Matthew  [id number]  1 y.o.  1995    Allergies: Patient has no known allergies. Admit Date: 5/23/2020    Discharge Date: 5/26/2020    * Admission Diagnoses: Bile leak, postoperative [K91.89, K83.8]  Bile leak, postoperative [K91.89, K83.8]    * Discharge Diagnoses:    Hospital Problems as of 5/26/2020 Date Reviewed: 5/15/2020          Codes Class Noted - Resolved POA    Bile leak, postoperative ICD-10-CM: K91.89, K83.8  ICD-9-CM: 997.49, 576.8  5/23/2020 - Present Unknown               Admission Condition: Good    * Discharge Condition: good    * Procedures: Procedure(s):  ENDOSCOPIC RETROGRADE CHOLANGIOPANCREATOGRAPHY (ERCP), sphincterotomy, balloon stone extraction, stent placement    [facility] Course:   Patient was admitted for increased LFT's and shoulder pain 8 days status post Lap cholecystectomy. Imaging showed postoperative Bile leak. ERCP with Biliary sphincterotomy, biliary stone removal and biliary stent placement performed. She was placed on Zosyn. Diet advance after post-procedure day 1. She was feeling a bit sluggish and pain was not controlled and she developed increased shoulder pain and CT scan was ordered post-procedure day 2. She was feeling much better Post-procedure day 3. Ct reviewed by Dr. Kaiden Diallo and she was cleared for discharge with office follow up. She will follow up with GI in 4 weeks for stent removal.     Consults: Gastroenterology    Significant Diagnostic Studies: Ct/ labs    * Disposition: Home    Discharge Medications:   Current Discharge Medication List          * Follow-up Care/Patient Instructions:   Activity: Activity as tolerated  Diet: Regular Diet  Wound Care: Keep wound clean and dry    Follow-up Information     Follow up With Specialties Details Why Contact Info    Giselle Sanabria NP Nurse Practitioner   [facility], [address] [place] [address]  [phone]          Follow-up tests/labs - Gi in 4 weeks for stent removal.   Follow-up Appointments   Procedures    FOLLOW UP VISIT Appointment in: One Week 6/2/2020 9:40 AM Jethro Ansari NP This will be a virtual appointment. Please call our office if you have any questions. [phone]     6/2/2020 9:40 AM Jethro Ansari NP   This will be a virtual appointment. Please call our office if you have any questions. [phone]     Standing Status:   Standing     Number of Occurrences:   1     Order Specific Question:   Appointment in     Answer:    One Week         Signed:  Leonel Gonsalez NP  5/26/2020  10:53 AM

## 2020-05-26 NOTE — DISCHARGE INSTRUCTIONS
Patient Education        Biliary Stent Placement: What to Expect at Home  Your Recovery  Endoscopic retrograde cholangiopancreatogram (ERCP)  If your biliary duct placement was done with ERCP, you probably will stay at the hospital or clinic for 1 to 2 hours. This will allow the numbing medicine to wear off. You will be able to go home after your doctor or a nurse checks to make sure that you are not having any problems. If you stay in the hospital overnight, you may go home the next day. You may have a sore throat for a day or two after the procedure. Percutaneous transhepatic cholangiography (PTC)  If the placement was done with PTC, your doctor may have you lie on your right side for at least 6 hours before you can go home. This is to lower the risk of bleeding from the injection site. If you stay in the hospital overnight, you may go home the next day. You may have some pain where the needle entered your skin (the puncture site). You may also have pain in your shoulder. This is called referred pain. It's caused by pain traveling along a nerve that goes to the liver. The referred pain usually lasts less than 12 hours. You may have a small amount of bleeding from the puncture site. You will need to take it easy at home for 1 or 2 days after the PTC. You will probably be able to go back to work and most of your usual activities after that. This care sheet gives you a general idea about how long it will take for you to recover. But each person recovers at a different pace. Follow the steps below to feel better as quickly as possible. How can you care for yourself at home? Activity    · Rest as much as you need to after you go home.     · You should be able to go back to your usual activities within a day or two, depending on how physically hard those activities are. Diet    · Follow your doctor's directions for eating after the procedure.     · Drink plenty of fluids (unless your doctor tells you not to). Medicines    · Your doctor will tell you if and when you can restart your medicines. He or she will also give you instructions about taking any new medicines.     · If you take aspirin or some other blood thinner, be sure to talk to your doctor. He or she will tell you if and when to start taking this medicine again. Make sure that you understand exactly what your doctor wants you to do. Follow-up care is a key part of your treatment and safety. Be sure to make and go to all appointments, and call your doctor if you are having problems. It's also a good idea to know your test results and keep a list of the medicines you take. When should you call for help? Call 911 anytime you think you may need emergency care. For example, call if:    · You passed out (lost consciousness).    Call your doctor now or seek immediate medical care if:    · You have pain that does not get better after you take pain medicine.     · You have signs of an infection, such as:  ? Increased pain, swelling, warmth, or redness. ? Red streaks leading from the area. ? Pus draining from the area. ? A fever.     · You are sick to your stomach or cannot hold down fluids.     · Bright red blood has soaked through the bandage.    Watch closely for changes in your health, and be sure to contact your doctor if you have any problems. Where can you learn more? Go to http://antonella-cynthia.info/  Enter B307 in the search box to learn more about \"Biliary Stent Placement: What to Expect at Home. \"  Current as of: August 11, 2019Content Version: 12.4  © 2235-7227 [employer]wise, Incorporated. Care instructions adapted under license by [employer] (which disclaims liability or warranty for this information). If you have questions about a medical condition or this instruction, always ask your healthcare professional. [address] any warranty or liability for your use of this information.

## 2020-05-26 NOTE — PROGRESS NOTES
Bedside shift change report given to Heena Lancaster RN (oncoming nurse) by Marielos Brewer RN (offgoing nurse). Report included the following information SBAR and Kardex.

## 2020-05-29 DIAGNOSIS — R79.89 ELEVATED LFTS: ICD-10-CM

## 2020-06-03 ENCOUNTER — OFFICE VISIT (OUTPATIENT)
Dept: SURGERY | Age: 25
End: 2020-06-03

## 2020-06-03 VITALS
RESPIRATION RATE: 16 BRPM | WEIGHT: 167 LBS | OXYGEN SATURATION: 98 % | SYSTOLIC BLOOD PRESSURE: 118 MMHG | BODY MASS INDEX: 27.82 KG/M2 | HEIGHT: 65 IN | DIASTOLIC BLOOD PRESSURE: 76 MMHG | TEMPERATURE: 98.5 F | HEART RATE: 68 BPM

## 2020-06-03 DIAGNOSIS — Z09 POSTOPERATIVE EXAMINATION: Primary | ICD-10-CM

## 2020-06-03 PROBLEM — K83.8 BILE LEAK, POSTOPERATIVE: Status: RESOLVED | Noted: 2020-05-23 | Resolved: 2020-06-03

## 2020-06-03 PROBLEM — K91.89 BILE LEAK, POSTOPERATIVE: Status: RESOLVED | Noted: 2020-05-23 | Resolved: 2020-06-03

## 2020-06-03 PROBLEM — K80.20 GALLSTONES: Status: RESOLVED | Noted: 2020-05-14 | Resolved: 2020-06-03

## 2020-06-03 NOTE — PROGRESS NOTES
1. Have you been to the ER, urgent care clinic since your last visit? Hospitalized since your last visit? No    2. Have you seen or consulted any other health care providers outside of the [address] [place] [address] since your last visit? Include any pap smears or colon screening.  No

## 2020-06-03 NOTE — PROGRESS NOTES
Subjective: Marlen Matthew  is a 31 y.o. female who presents for f/u s/p lap jai on 05/15/20. Pt presented to the ED on 05/11/20 c/o of RUQ abdominal pain. Abdominal US at that time showed gallstones present. She was diagnosed with gallstones and pancreatitis in December 2019, but was pregnant at that time and did not want to undergo surgery. Pt was scheduled for lap jai on on 05/18/20, but ended up at the ED on 05/14/20 c/o \"gallstone attack\" and underwent surgery the following day. Pt had episode of back pain for which she presented to the ED on 05/21/20. Pain resolved with current pain medication regimen for post-op pain. Pt again presented to the ED on 05/23/20 with post-op bile leak. ERCP with biliary sphincterotomy, biliary stone removal and biliary stent placement performed. Pt was discharged on 05/26/20. Today, pt states she is doing well and has been recovering nicely. Pt reports plans to have stent removed at the end of the month. Past Medical History:   Diagnosis Date    Anemia     Anemia affecting pregnancy in third trimester 12/26/2019    Anxiety 5/7/2017    Back muscle spasm 1/14/2018    Breast lump on left side at 2 o'clock position 5/7/2017    Gall stones     Gallstone pancreatitis 5/13/2020    Pancreatitis     Psychiatric problem     Anxiety, no current medications       Past Surgical History:   Procedure Laterality Date    HX CHOLECYSTECTOMY  05/15/2020    HX ERCP  05/23/2020    sphincterotomy, balloon stone extraction, stent placement    HX LAP CHOLECYSTECTOMY  05/15/2020       Social History     Tobacco Use    Smoking status: Never Smoker    Smokeless tobacco: Never Used   Substance Use Topics    Alcohol use: Not Currently     Comment: occasional        No family history on file. Current Outpatient Medications on File Prior to Visit   Medication Sig Dispense Refill    sertraline (ZOLOFT) 50 mg tablet Take  by mouth daily.       prenatal 17/NHVJ fum/folic/dha (PRENATAL-1 PO) Take 1 Tab by mouth daily.  docusate sodium (Colace) 100 mg capsule Take 100 mg by mouth daily. No current facility-administered medications on file prior to visit. No Known Allergies      Review of Systems:    A comprehensive review of systems was negative except for that written in the History of Present Illness. Objective:     Visit Vitals  /76 (BP 1 Location: Left arm, BP Patient Position: Sitting)   Pulse 68   Temp 98.5 °F (36.9 °C) (Oral)   Resp 16   Ht 5' 5\" (1.651 m)   Wt 167 lb (75.8 kg)   SpO2 98%   BMI 27.79 kg/m²        Physical Exam:  ABDOMEN: Incisions healing well. Abdomen is soft and non-tender. Labs: No results found for this or any previous visit (from the past 24 hour(s)). Data Review:      FINAL PATHOLOGIC DIAGNOSIS  Gallbladder, cholecystectomy:   Acute and chronic cholecystitis with cholelithiasis   Cholesterolosis   One benign lymph node    Assessment and Plan:       ICD-10-CM ICD-9-CM    1. Postoperative examination Z09 V67.00        Pt may return to normal activities. F/U PRN. All questions were answered and pt is in agreement with this plan. This document was scribed by Natasha Morales as dictated by Dr. Steve Encarnacion.      Signed By: Bambi Read MD     06/03/20

## 2020-06-03 NOTE — LETTER
6/3/20 Patient: Brie Gracia YOB: 1995 Date of Visit: 6/3/2020 Giselle Hoskins NP 
[address], [address] [place] [id number] VIA In Basket Dear Giselle Hoskins NP, Thank you for referring Ms. Selena Sierra to [address] [facility] for evaluation. My notes for this consultation are attached. If you have questions, please do not hesitate to call me. I look forward to following your patient along with you. Sincerely, Rizwan Ayala MD

## 2020-06-19 ENCOUNTER — OFFICE VISIT (OUTPATIENT)
Dept: URGENT CARE | Age: 25
End: 2020-06-19

## 2020-06-19 VITALS — HEART RATE: 86 BPM | TEMPERATURE: 98.1 F | RESPIRATION RATE: 16 BRPM | OXYGEN SATURATION: 99 %

## 2020-06-19 DIAGNOSIS — Z20.822 ENCOUNTER FOR LABORATORY TESTING FOR COVID-19 VIRUS: Primary | ICD-10-CM

## 2020-06-23 ENCOUNTER — ANESTHESIA EVENT (OUTPATIENT)
Dept: ENDOSCOPY | Age: 25
End: 2020-06-23
Payer: OTHER GOVERNMENT

## 2020-06-23 ENCOUNTER — HOSPITAL ENCOUNTER (OUTPATIENT)
Age: 25
Setting detail: OUTPATIENT SURGERY
Discharge: HOME | End: 2020-06-23
Attending: SPECIALIST | Admitting: SPECIALIST
Payer: OTHER GOVERNMENT

## 2020-06-23 ENCOUNTER — APPOINTMENT (OUTPATIENT)
Dept: GENERAL RADIOLOGY | Age: 25
End: 2020-06-23
Attending: SPECIALIST
Payer: OTHER GOVERNMENT

## 2020-06-23 ENCOUNTER — ANESTHESIA (OUTPATIENT)
Dept: ENDOSCOPY | Age: 25
End: 2020-06-23
Payer: OTHER GOVERNMENT

## 2020-06-23 VITALS
BODY MASS INDEX: 27.79 KG/M2 | RESPIRATION RATE: 14 BRPM | SYSTOLIC BLOOD PRESSURE: 114 MMHG | TEMPERATURE: 97.6 F | DIASTOLIC BLOOD PRESSURE: 85 MMHG | WEIGHT: 167 LBS | HEART RATE: 79 BPM | OXYGEN SATURATION: 100 %

## 2020-06-23 LAB
HCG UR QL: NEGATIVE
SARS-COV-2, NAA: NOT DETECTED

## 2020-06-23 PROCEDURE — 76060000032 HC ANESTHESIA 0.5 TO 1 HR: Performed by: SPECIALIST

## 2020-06-23 PROCEDURE — 74011250636 HC RX REV CODE- 250/636: Performed by: NURSE ANESTHETIST, CERTIFIED REGISTERED

## 2020-06-23 PROCEDURE — 77030012596 HC SPHNTOM BILI BSC -E: Performed by: SPECIALIST

## 2020-06-23 PROCEDURE — 74011000250 HC RX REV CODE- 250: Performed by: NURSE ANESTHETIST, CERTIFIED REGISTERED

## 2020-06-23 PROCEDURE — 77030007288 HC DEV LOK BILI BSC -A: Performed by: SPECIALIST

## 2020-06-23 PROCEDURE — 77030031656 HC FCPS ENDO GRSP DISP BSC -B: Performed by: SPECIALIST

## 2020-06-23 PROCEDURE — 77030009038 HC CATH BILI STN RTVR BSC -C: Performed by: SPECIALIST

## 2020-06-23 PROCEDURE — 81025 URINE PREGNANCY TEST: CPT

## 2020-06-23 PROCEDURE — 77030013992 HC SNR POLYP ENDOSC BSC -B: Performed by: SPECIALIST

## 2020-06-23 PROCEDURE — 76040000007: Performed by: SPECIALIST

## 2020-06-23 PROCEDURE — 74011636320 HC RX REV CODE- 636/320

## 2020-06-23 PROCEDURE — 77030040361 HC SLV COMPR DVT MDII -B: Performed by: SPECIALIST

## 2020-06-23 RX ORDER — LIDOCAINE HYDROCHLORIDE 20 MG/ML
INJECTION, SOLUTION EPIDURAL; INFILTRATION; INTRACAUDAL; PERINEURAL AS NEEDED
Status: DISCONTINUED | OUTPATIENT
Start: 2020-06-23 | End: 2020-06-23 | Stop reason: HOSPADM

## 2020-06-23 RX ORDER — GLYCOPYRROLATE 0.2 MG/ML
INJECTION INTRAMUSCULAR; INTRAVENOUS AS NEEDED
Status: DISCONTINUED | OUTPATIENT
Start: 2020-06-23 | End: 2020-06-23 | Stop reason: HOSPADM

## 2020-06-23 RX ORDER — PROPOFOL 10 MG/ML
INJECTION, EMULSION INTRAVENOUS AS NEEDED
Status: DISCONTINUED | OUTPATIENT
Start: 2020-06-23 | End: 2020-06-23 | Stop reason: HOSPADM

## 2020-06-23 RX ADMIN — PROPOFOL 100 MG: 10 INJECTION, EMULSION INTRAVENOUS at 13:17

## 2020-06-23 RX ADMIN — PROPOFOL 50 MG: 10 INJECTION, EMULSION INTRAVENOUS at 13:28

## 2020-06-23 RX ADMIN — PROPOFOL 50 MG: 10 INJECTION, EMULSION INTRAVENOUS at 13:18

## 2020-06-23 RX ADMIN — GLYCOPYRROLATE 0.2 MG: 0.2 INJECTION, SOLUTION INTRAMUSCULAR; INTRAVENOUS at 13:15

## 2020-06-23 RX ADMIN — LIDOCAINE HYDROCHLORIDE 40 MG: 20 INJECTION, SOLUTION EPIDURAL; INFILTRATION; INTRACAUDAL; PERINEURAL at 13:16

## 2020-06-23 RX ADMIN — IOPAMIDOL 10 ML: 612 INJECTION, SOLUTION INTRAVENOUS at 13:27

## 2020-06-23 RX ADMIN — PROPOFOL 100 MG: 10 INJECTION, EMULSION INTRAVENOUS at 13:21

## 2020-06-23 RX ADMIN — PROPOFOL 50 MG: 10 INJECTION, EMULSION INTRAVENOUS at 13:24

## 2020-06-23 RX ADMIN — PROPOFOL 50 MG: 10 INJECTION, EMULSION INTRAVENOUS at 13:30

## 2020-06-23 RX ADMIN — PROPOFOL 50 MG: 10 INJECTION, EMULSION INTRAVENOUS at 13:23

## 2020-06-23 RX ADMIN — PROPOFOL 50 MG: 10 INJECTION, EMULSION INTRAVENOUS at 13:27

## 2020-06-23 RX ADMIN — PROPOFOL 50 MG: 10 INJECTION, EMULSION INTRAVENOUS at 13:22

## 2020-06-23 RX ADMIN — PROPOFOL 50 MG: 10 INJECTION, EMULSION INTRAVENOUS at 13:19

## 2020-06-23 NOTE — PERIOP NOTES
1300: .Patient has been evaluated by anesthesia pre-procedure. Patient alert and oriented. Vital signs will not be charted by the Endoscopy nurse. All vitals, airway, and loc are monitored by anesthesia staff throughout procedure.        .Endoscope was pre-cleaned at bedside immediately following procedure by FEI

## 2020-06-23 NOTE — PROCEDURES
[address] [facility]  [address]                NAME:  Inna Mcguire   :   1995   MRN:   [id number]     [address] [facility]  Date/Time:  2020 1:50 PM    Procedure Type:   ERCPwith biliary stent removal     Indications: Bile duct leak, CBD stones    Preoperative diagnosis: GALLSTONE PANCREATITIS    Postoperative diagnosis: Stent removal, cystic duct leak resolved    : Jaci Schwartz MD    Referring Provider:  Giselle Carvajal NP    Sedation:  MAC anesthesia Propofol  Procedure Details:  After informed consent was obtained with all risks and benefits of procedure explained, the patient was taken to the fluoroscopy suite and placed in the prone position. Upon sequential sedation as per above, the Olympus duodenoscope [id number]   was inserted via the mouthpeice and carefully advanced to the second portion of the duodenum. The quality of visualization was good. The duodenoscope was withdrawn into a short position. Findings:   Findings:   Esophagus:normal  Stomach: normal   Duodenum/jejunum: normal    Ampulla:protruding stent removed with snare  Cholangiogram: -normal intra and extrahepatic ducts with no stones or leak, balloon sweep performed and no extravasation seen. Pancreatogram:not performed    Specimen Removed:  None    Complications: None. EBL:  None. Interventions:    Pancreatic: none  Biliary: as above    Impression: Cystic duct leak appears to have resolved    Recommendations:  Clear liquid diet and advance as tolerated.   FU in office PRN      Discharge Disposition:  Home following recovery in Endoscopy    Jaci Schwartz MD

## 2020-06-23 NOTE — ANESTHESIA POSTPROCEDURE EVALUATION
Post-Anesthesia Evaluation and Assessment    Patient: Kassy West MRN: [id number]  SSN: [id number]    YOB: 1995  Age: 31 y.o. Sex: female      I have evaluated the patient and they are stable and ready for discharge from the PACU. Cardiovascular Function/Vital Signs  Visit Vitals  /85   Pulse 79   Temp 36.4 °C (97.6 °F)   Resp 14   Wt 75.8 kg (167 lb)   SpO2 100%   Breastfeeding No   BMI 27.79 kg/m²       Patient is status post MAC anesthesia for Procedure(s):  ENDOSCOPIC RETROGRADE CHOLANGIOPANCREATOGRAPHY (ERCP) WITH STENT CHANGE awaiting  ENDOSCOPY WITH PROSTHESIS OR STENT REMOVAL  ENDOSCOPIC STONE EXTRACTION/BALLOON SWEEP. Nausea/Vomiting: None    Postoperative hydration reviewed and adequate. Pain:  Pain Scale 1: Numeric (0 - 10) (06/23/20 1412)  Pain Intensity 1: 0 (06/23/20 1412)   Managed    Neurological Status: At baseline    Mental Status, Level of Consciousness: Alert and  oriented to person, place, and time    Pulmonary Status:   O2 Device: Room air (06/23/20 1412)   Adequate oxygenation and airway patent    Complications related to anesthesia: None    Post-anesthesia assessment completed. No concerns    Signed By: Manish Rivera MD     June 23, 2020              Procedure(s):  ENDOSCOPIC RETROGRADE CHOLANGIOPANCREATOGRAPHY (ERCP) WITH STENT CHANGE awaiting  ENDOSCOPY WITH PROSTHESIS OR STENT REMOVAL  ENDOSCOPIC STONE EXTRACTION/BALLOON SWEEP. MAC    <BSHSIANPOST>    INITIAL Post-op Vital signs:   Vitals Value Taken Time   /79 6/23/2020  2:27 PM   Temp 36.4 °C (97.6 °F) 6/23/2020  1:52 PM   Pulse 69 6/23/2020  2:28 PM   Resp 11 6/23/2020  2:28 PM   SpO2 100 % 6/23/2020  2:28 PM   Vitals shown include unvalidated device data.

## 2020-06-23 NOTE — ANESTHESIA PREPROCEDURE EVALUATION
Relevant Problems   No relevant active problems       Anesthetic History   No history of anesthetic complications            Review of Systems / Medical History  Patient summary reviewed, nursing notes reviewed and pertinent labs reviewed    Pulmonary  Within defined limits                 Neuro/Psych         Psychiatric history     Cardiovascular  Within defined limits                Exercise tolerance: >4 METS     GI/Hepatic/Renal  Within defined limits           Pertinent negatives: No GERD   Endo/Other  Within defined limits           Other Findings              Physical Exam    Airway  Mallampati: I  TM Distance: 4 - 6 cm  Neck ROM: normal range of motion   Mouth opening: Normal     Cardiovascular  Regular rate and rhythm,  S1 and S2 normal,  no murmur, click, rub, or gallop             Dental  No notable dental hx       Pulmonary  Breath sounds clear to auscultation               Abdominal  Abdominal exam normal       Other Findings            Anesthetic Plan    ASA: 2  Anesthesia type: MAC          Induction: Intravenous  Anesthetic plan and risks discussed with: Patient

## 2020-06-23 NOTE — DISCHARGE INSTRUCTIONS
Melita Oliver  [id number]  1995    ERCP DISCHARGE INSTRUCTIONS  Discomfort:  Sore throat- throat lozenges or warm salt water gargle  redness at IV site- apply warm compress to area; if redness or soreness persist- contact your physician  Gaseous discomfort- walking, belching will help relieve any discomfort    DIET  You may resume your regular diet - however -  remember your colon is empty and a heavy meal will produce gas. Avoid these foods:  vegetables, fried / greasy foods, carbonated drinks  You may not drink alcoholic beverages for at least 12 hours    MEDICATIONS        Regarding Aspirin or Nonsteroidal medications specifically, please see below. ACTIVITY  You may resume your normal daily activities. Spend the remainder of the day resting -  avoid any strenuous activity. You may not operate a vehicle for 12 hours  You may not engage in an occupation involving machinery or appliances for rest of today. Avoid making any critical decisions for at least 24 hour    CALL M.D. ANY SIGN OF   Increasing pain, nausea, vomiting  Abdominal distension (swelling)  New increased bleeding (oral or rectal)  Fever (chills)  Pain in chest area  Bloody discharge from nose or mouth  Shortness of breath    You may not take any Advil, Aspirin, Ibuprofen, Motrin, Aleve, or  Tylenol as needed for pain.     Post procedure diagnosis: Stent removal, cystic duct leak resolved      Follow-up Instructions:   Call Dr. Vel Alaniz office if you develop severe pain or high fever    Telephone #  [phone]        DISCHARGE SUMMARY from Nurse    The following personal items collected during your admission are returned to you:   Dental Appliance: Dental Appliances: None  Vision: Visual Aid: None  Hearing Aid:    Jewelry:    Clothing:    Other Valuables:    Valuables sent to safe:

## 2020-06-23 NOTE — ROUTINE PROCESS
Toro Mcgowan 1995 
[id number] Situation: 
Verbal report received from: Blessing 
Procedure: Procedure(s): ENDOSCOPIC RETROGRADE CHOLANGIOPANCREATOGRAPHY (ERCP) WITH STENT CHANGE awaiting ENDOSCOPY WITH PROSTHESIS OR STENT REMOVAL 
ENDOSCOPIC STONE EXTRACTION/BALLOON SWEEP Background: 
 
Preoperative diagnosis: GALLSTONE PANCREATITIS Postoperative diagnosis: Stent removal, cystic duct leak resolved :  Dr. Yadav Session Assistant(s): Endoscopy Technician-1: Alfredito Adair Endoscopy RN-1: Lizet Sethi RN Specimens: H. Pylori Assessment: 
Intra-procedure medications Anesthesia gave intra-procedure sedation and medications, see anesthesia flow sheet yes Intravenous fluids: NS@ Buren Jeff Vital signs stable yes Abdominal assessment: round and soft yes Recommendation: 
Discharge patient per MD order Return to floor Family or Friend yes Permission to share finding with family or friend yes

## 2020-06-23 NOTE — H&P
Pre-endoscopy H and P     The patient was seen and examined in the endoscopy suite. The airway was assessed and docuemented. The problem list and medications were reviewed.      Patient Active Problem List   Diagnosis Code    Anxiety F41.9    31 weeks gestation of pregnancy Z3A.31    Acute midline thoracic back pain M54.6    Anemia affecting pregnancy in third trimester O99.013    Gallstone pancreatitis K85.10     Social History     Socioeconomic History    Marital status:      Spouse name: Not on file    Number of children: Not on file    Years of education: Not on file    Highest education level: Not on file   Occupational History    Not on file   Social Needs    Financial resource strain: Not on file    Food insecurity     Worry: Not on file     Inability: Not on file    Transportation needs     Medical: Not on file     Non-medical: Not on file   Tobacco Use    Smoking status: Never Smoker    Smokeless tobacco: Never Used   Substance and Sexual Activity    Alcohol use: Not Currently     Comment: occasional     Drug use: No    Sexual activity: Yes     Partners: Male   Lifestyle    Physical activity     Days per week: Not on file     Minutes per session: Not on file    Stress: Not on file   Relationships    Social connections     Talks on phone: Not on file     Gets together: Not on file     Attends Orthodox service: Not on file     Active member of club or organization: Not on file     Attends meetings of clubs or organizations: Not on file     Relationship status: Not on file    Intimate partner violence     Fear of current or ex partner: Not on file     Emotionally abused: Not on file     Physically abused: Not on file     Forced sexual activity: Not on file   Other Topics Concern     Service Not Asked    Blood Transfusions Not Asked    Caffeine Concern Not Asked    Occupational Exposure Not Asked   Onalee Colon Hazards Not Asked    Sleep Concern Not Asked    Stress Concern Not Asked    Weight Concern Not Asked    Special Diet Not Asked    Back Care Not Asked    Exercise Not Asked    Bike Helmet Not Asked   [address] Not Asked    Self-Exams Not Asked   Social History Narrative    Not on file     Past Medical History:   Diagnosis Date    Anemia     Anemia affecting pregnancy in third trimester 12/26/2019    Anxiety 5/7/2017    Back muscle spasm 1/14/2018    Breast lump on left side at 2 o'clock position 5/7/2017    Gall stones     Gallstone pancreatitis 5/13/2020    Pancreatitis     Psychiatric problem     Anxiety, no current medications         Prior to Admission Medications   Prescriptions Last Dose Informant Patient Reported? Taking?   docusate sodium (Colace) 100 mg capsule Not Taking at Unknown time  Yes No   Sig: Take 100 mg by mouth daily. prenatal 50/DQTV fum/folic/dha (PRENATAL-1 PO) Not Taking at Unknown time  Yes No   Sig: Take 1 Tab by mouth daily. sertraline (ZOLOFT) 50 mg tablet 6/22/2020 at Unknown time  Yes Yes   Sig: Take  by mouth daily. Facility-Administered Medications: None       Chief complaint, history of present illness, and review of systems and Past medical History are positive for: CBD stone and cystic duct leak status post stent placement and biliary sphincterotomy and stone extraction    The heart, lungs and mental status were satisfactory for the administration of sedation and for the procedure. I discussed with the patient the objectives, risks, consequences and alternatives to the procedure. The patient was counseled at length about the risks of ramo Covid-19 in the amanda-operative and post-operative states including the recovery window of their procedure. The patient was made aware that ramo Covid-19 after a surgical procedure may worsen their prognosis for recovering from the virus and lend to a higher morbidity and or mortality risk. The patient was given the options of postponing their procedure.  All of the risks, benefits, and alternatives were discussed. The patient does  wish to proceed with the procedure.     Plan: Endoscopic procedure with sedation     Erica Richey MD   6/23/2020  1:15 PM

## 2022-03-18 PROBLEM — K85.10 GALLSTONE PANCREATITIS: Status: ACTIVE | Noted: 2020-05-13

## 2022-03-19 PROBLEM — M54.6 ACUTE MIDLINE THORACIC BACK PAIN: Status: ACTIVE | Noted: 2019-12-26

## 2022-03-19 PROBLEM — F41.9 ANXIETY: Status: ACTIVE | Noted: 2017-05-07

## 2022-03-19 PROBLEM — O99.013 ANEMIA AFFECTING PREGNANCY IN THIRD TRIMESTER: Status: ACTIVE | Noted: 2019-12-26

## 2022-03-19 PROBLEM — Z3A.31 31 WEEKS GESTATION OF PREGNANCY: Status: ACTIVE | Noted: 2019-12-26

## 2022-07-25 NOTE — PROGRESS NOTES
Other (lump left breast ); Stress; and Anxiety       HPI:  Kay Esparza is a 20y.o. year old female who is here for a follow up visit. She was last seen by me on 5/5/2017. She reports the following:    Likes rescue remedy 4 drops. Go to the gym during 3 times a week. 3 huge knots on shoulder blade. Bothering her at times. Gets massage and it helps some. Acid reflux on and off. Late at night. No difficulty swallowing. Muscle spasm- neck spasm. No new changes with breast area. Wants referral to dermatologist to see about acne        Assessment and Plan        1. Gastroesophageal reflux disease without esophagitis  Trial of PPI only to be used as needed and not daily. Do not eat late at night. Keep head of bed elevated. Reduce acid in coffee. - RABEprazole (ACIPHEX) 20 mg tablet; Take 1 Tab by mouth daily as needed. Indications: HEARTBURN  Dispense: 20 Tab; Refill: 0    2. Neck muscle spasm  Acupuncture to spasm on right shoulder blade at GB 21 and TH 15.      3. Acne, unspecified acne type  Referral given to patient.    - REFERRAL TO DERMATOLOGY       4. Call if any changes in breast area. Pt states she sent images to friend who is a radiologist and found no abnormalities. Visit Vitals    /60 (BP 1 Location: Left arm, BP Patient Position: Sitting)    Pulse 73    Temp 98.2 °F (36.8 °C) (Oral)    Resp 18    Ht 5' 6\" (1.676 m)    Wt 150 lb (68 kg)    SpO2 98%    BMI 24.21 kg/m2       Historical Data    Past Medical History:   Diagnosis Date    Anxiety 5/7/2017    Breast lump on left side at 2 o'clock position 5/7/2017       No past surgical history on file. Outpatient Encounter Prescriptions as of 6/30/2017   Medication Sig Dispense Refill    RABEprazole (ACIPHEX) 20 mg tablet Take 1 Tab by mouth daily as needed. Indications: HEARTBURN 20 Tab 0     No facility-administered encounter medications on file as of 6/30/2017.          No Known Allergies     Social History     Social History    Marital status: SINGLE     Spouse name: N/A    Number of children: N/A    Years of education: N/A     Occupational History    Not on file. Social History Main Topics    Smoking status: Never Smoker    Smokeless tobacco: Never Used    Alcohol use Yes      Comment: occasional     Drug use: No    Sexual activity: Not on file     Other Topics Concern    Not on file     Social History Narrative        family history is not on file. Review of Systems   Constitutional: Negative for weight loss. HENT: Negative for congestion. Eyes: Negative for blurred vision. Respiratory: Negative for shortness of breath. Cardiovascular: Negative for chest pain. Gastrointestinal: Negative for abdominal pain and blood in stool. Genitourinary: Negative for dysuria and frequency. Skin: Positive for rash. Neurological: Negative for dizziness, focal weakness, weakness and headaches. Endo/Heme/Allergies: Negative for environmental allergies. Does not bruise/bleed easily. Physical Exam   Constitutional: She appears well-developed and well-nourished. She is active. Non-toxic appearance. She does not have a sickly appearance. She does not appear ill. No distress. Eyes: Conjunctivae are normal. Right eye exhibits no discharge. Cardiovascular: Normal rate, regular rhythm, S1 normal, S2 normal, normal heart sounds and normal pulses. Exam reveals no gallop and no friction rub. Pulmonary/Chest: Effort normal and breath sounds normal. No respiratory distress. Abdominal: Soft. Bowel sounds are normal.   Musculoskeletal: She exhibits no edema or deformity. Cervical back: She exhibits spasm. She exhibits normal range of motion. Back:    Neurological: She is alert. Skin: Skin is warm and dry. No rash noted. No pallor. Psychiatric: She has a normal mood and affect. Her behavior is normal.   Vitals reviewed.      Ortho Exam      Orders Placed This Encounter  REFERRAL TO DERMATOLOGY     Referral Priority:   Routine     Referral Type:   Consultation     Referral Reason:   Specialty Services Required     Referred to Provider:   Tabby Yfnryandeacon    RABEprazole (ACIPHEX) 20 mg tablet     Sig: Take 1 Tab by mouth daily as needed. Indications: HEARTBURN     Dispense:  20 Tab     Refill:  0        I have reviewed the patient's medical history in detail and updated the computerized patient record. We had a prolonged discussion about these complex clinical issues and went over the various important aspects to consider. All questions were answered. Advised her to call back or return to office if symptoms do not improve, change in nature, or persist.    She was given an after visit summary or informed of [employer] Access which includes patient instructions, diagnoses, current medications, & vitals. She expressed understanding with the diagnosis and plan. normal (ped)... 2 seconds or less

## 2023-05-20 RX ORDER — PSEUDOEPHEDRINE HCL 30 MG
100 TABLET ORAL DAILY
COMMUNITY

## 2024-11-23 NOTE — PROGRESS NOTES
High Risk Obstetrics Progress Note    Name: Valorie Perez MRN: [id number]  SSN: [id number]    YOB: 1995  Age: 21 y.o. Sex: female      Subjective:      LOS: 0 days    Estimated Date of Delivery: 20   Gestational Age Today: 35w11d     Assumed care of patient from [facility]. Patient with mild acute pancreatitis. Amylase 579, Lipase >3000, WBC 12.5. LFTs wnl. She reports 9/10 mid-thoracic back pain in the middle of the night as well as GERD and SOB which are the symptoms which brought her in . She took 1000mg of Tylenol at approximately 3am. She has required no pain medication since arrival at [facility]. She reports feeling much better since aggressive IV hydration. Her vital signs have remained stable, she denies nausea or vomiting and reports good FM. Denies drinking alcohol, any history of gall stones, recent viral illness, taking any medications other than prenatal vitamins, iron, and occasional Tylenol. No family history of pancreatitis or gall bladder disease. She did have a similar episode the week prior to , but it was not as severe and resolved with Tylenol. Objective:     Vitals:  Blood pressure 119/71, pulse 99, temperature 97.9 °F (36.6 °C), resp. rate 14, height 5' 5\" (1.651 m), weight 87.1 kg (192 lb), not currently breastfeeding.    Temp (24hrs), Av.9 °F (36.6 °C), Min:97.9 °F (36.6 °C), Max:97.9 °F (22.3 °C)    Systolic (92BOJ), HPT:609 , Min:103 , RWC:317      Diastolic (89FGW), IOC:69, Min:68, Max:71      Physical Exam:  Gen: NAD  Resp: no respiratory distress  CV: RR  Abd: soft  Pelvis: no bleeding or LOF  Ext: no calf tenderness bilaterally      Membranes:  Intact    Uterine Activity:  None    Fetal Heart Rate:  140s, moderate variability, +accelerations, no decelerations, overall reassuring      Labs:   Recent Results (from the past 36 hour(s))   AMYLASE    Collection Time: 19  4:19 AM   Result Value Ref Range    Amylase 579 (H) 25 - 115 U/L   LIPASE    Collection Patient complained earlier in the shift that her room was too hot. This nurse requested maintenance to come and fix the heat and they state they have to turn it off. Patient now complains her room is too cold and requests to change rooms. Patient moved to bed 261.   Time: 12/26/19  4:19 AM   Result Value Ref Range    Lipase >3,000 (H) 73 - 393 U/L   CBC W/O DIFF    Collection Time: 12/26/19  4:19 AM   Result Value Ref Range    WBC 12.5 (H) 3.6 - 11.0 K/uL    RBC 3.48 (L) 3.80 - 5.20 M/uL    HGB 10.4 (L) 11.5 - 16.0 g/dL    HCT 30.4 (L) 35.0 - 47.0 %    MCV 87.4 80.0 - 99.0 FL    MCH 29.9 26.0 - 34.0 PG    MCHC 34.2 30.0 - 36.5 g/dL    RDW 12.6 11.5 - 14.5 %    PLATELET 622 769 - 260 K/uL    MPV 11.0 8.9 - 12.9 FL    NRBC 0.0 0  WBC    ABSOLUTE NRBC 0.00 0.00 - 2.84 K/uL   METABOLIC PANEL, COMPREHENSIVE    Collection Time: 12/26/19  4:19 AM   Result Value Ref Range    Sodium 138 136 - 145 mmol/L    Potassium 3.5 3.5 - 5.1 mmol/L    Chloride 107 97 - 108 mmol/L    CO2 23 21 - 32 mmol/L    Anion gap 8 5 - 15 mmol/L    Glucose 114 (H) 65 - 100 mg/dL    BUN 9 6 - 20 MG/DL    Creatinine 0.57 0.55 - 1.02 MG/DL    BUN/Creatinine ratio 16 12 - 20      GFR est AA >60 >60 ml/min/1.73m2    GFR est non-AA >60 >60 ml/min/1.73m2    Calcium 8.8 8.5 - 10.1 MG/DL    Bilirubin, total 0.2 0.2 - 1.0 MG/DL    ALT (SGPT) 16 12 - 78 U/L    AST (SGOT) 16 15 - 37 U/L    Alk. phosphatase 49 45 - 117 U/L    Protein, total 6.3 (L) 6.4 - 8.2 g/dL    Albumin 2.9 (L) 3.5 - 5.0 g/dL    Globulin 3.4 2.0 - 4.0 g/dL    A-G Ratio 0.9 (L) 1.1 - 2.2         Assessment and Plan:      Principal Problem:    Acute midline thoracic back pain (12/26/2019)    Active Problems:    31 weeks gestation of pregnancy (12/26/2019)      Anemia affecting pregnancy in third trimester (12/26/2019)       Mild acute pancreatitis. Discussed case with Dr. Nalini Askew ([facility]) who is in agreement with diagnosis at this point and supportive management with aggressive IV hydration and repeat labs in 12 hours. Will obtain an abdominal ultrasound to evaluate biliary tree for blockages and abnormalities as potential contributing causes.  Time spent with patient and her  discussing the diagnosis, recommended imaging, and supportive management. At this discussion they expressed understanding and agreement with the current plan. Time was allotted for questions and answers.     Signed By: Gilda Michael MD     December 26, 2019

## (undated) DEVICE — CATHETER ENDOSCP L13IN DIA5FR CHOLGM W/ RADLUC INTRO SHTH

## (undated) DEVICE — LAPAROSCOPIC TROCAR SLEEVE/SINGLE USE: Brand: KII® OPTICAL ACCESS SYSTEM

## (undated) DEVICE — Device

## (undated) DEVICE — SUTURE MCRYL SZ 4-0 L27IN ABSRB UD L19MM PS-2 1/2 CIR PRIM Y426H

## (undated) DEVICE — (D)PREP SKN CHLRAPRP APPL 26ML -- CONVERT TO ITEM 371833

## (undated) DEVICE — CLICKLINE SCISSORS INSERT: Brand: CLICKLINE

## (undated) DEVICE — SOLUTION IRRIGATION NACL 0.9% 1000 ML FLX CONTAINER

## (undated) DEVICE — REM POLYHESIVE ADULT PATIENT RETURN ELECTRODE: Brand: VALLEYLAB

## (undated) DEVICE — SPHINCTEROTOME: Brand: DREAMTOME™ RX 44

## (undated) DEVICE — SURGICAL PROCEDURE KIT GEN LAPAROSCOPY LF

## (undated) DEVICE — GARMENT,MEDLINE,DVT,INT,CALF,LG, GEN2: Brand: MEDLINE

## (undated) DEVICE — STERILE POLYISOPRENE POWDER-FREE SURGICAL GLOVES WITH EMOLLIENT COATING: Brand: PROTEXIS

## (undated) DEVICE — SYR 10ML LUER LOK 1/5ML GRAD --

## (undated) DEVICE — TROCAR SITE CLOSURE DEVICE: Brand: ENDO CLOSE

## (undated) DEVICE — DERMABOND SKIN ADH 0.7ML -- DERMABOND ADVANCED 12/BX

## (undated) DEVICE — TROCAR: Brand: KII® OPTICAL ACCESS SYSTEM

## (undated) DEVICE — BAG SPEC REM 224ML W4XL6IN DIA10MM 1 HND GYN DISP ENDOPCH

## (undated) DEVICE — TROCAR: Brand: KII® SLEEVE

## (undated) DEVICE — DEVICE LCK BILI RAP EXCHG OLPS --

## (undated) DEVICE — STRAP,POSITIONING,KNEE/BODY,FOAM,4X60": Brand: MEDLINE

## (undated) DEVICE — DRAPE,UTILTY,TAPE,15X26, 4EA/PK: Brand: MEDLINE

## (undated) DEVICE — ELECTRODE,RADIOTRANSLUCENT,FOAM,3PK: Brand: MEDLINE

## (undated) DEVICE — BITE BLK ENDOSCP AD 54FR GRN POLYETH ENDOSCP W STRP SLD

## (undated) DEVICE — DISSECTOR RMFG CURVED 5MM --

## (undated) DEVICE — RETRIEVAL BALLOON CATHETER: Brand: EXTRACTOR™ PRO RX

## (undated) DEVICE — NEEDLE HYPO 25GA L1.5IN BVL ORIENTED ECLIPSE

## (undated) DEVICE — AGENT HEMSTAT W2XL14IN OXIDIZED REGENERATED CELOS ABSRB FOR

## (undated) DEVICE — FILTER SMK EVAC FLO CLR MEGADYNE

## (undated) DEVICE — DRAPE XR C ARM 41X74IN LF --

## (undated) DEVICE — SUTURE SZ 0 27IN 5/8 CIR UR-6  TAPER PT VIOLET ABSRB VICRYL J603H

## (undated) DEVICE — (D)FCPS GRSP 9MM 230CMLX2.4MM -- DISC BY MFR

## (undated) DEVICE — GARMENT,MEDLINE,DVT,INT,CALF,MED, GEN2: Brand: MEDLINE

## (undated) DEVICE — APPLIER CLP L SHFT DIA12MM 20 ROT MULT LIGACLP

## (undated) DEVICE — INFECTION CONTROL KIT SYS

## (undated) DEVICE — BILIARY STENT WITH NAVIFLEXTM RX DELIVERY SYSTEM
Type: IMPLANTABLE DEVICE | Site: BILE DUCT | Status: NON-FUNCTIONAL
Brand: ADVANIX™ BILIARY

## (undated) DEVICE — ENDO CARRY-ON PROCEDURE KIT INCLUDES ENZYMATIC SPONGE, GAUZE, BIOHAZARD LABEL, TRAY, LUBRICANT, DIRTY SCOPE LABEL, WATER LABEL, TRAY, DRAWSTRING PAD, AND DEFENDO 4-PIECE KIT.: Brand: ENDO CARRY-ON PROCEDURE KIT